# Patient Record
Sex: MALE | Race: WHITE | Employment: OTHER | URBAN - METROPOLITAN AREA
[De-identification: names, ages, dates, MRNs, and addresses within clinical notes are randomized per-mention and may not be internally consistent; named-entity substitution may affect disease eponyms.]

---

## 2017-05-05 ENCOUNTER — APPOINTMENT (OUTPATIENT)
Dept: GENERAL RADIOLOGY | Age: 67
End: 2017-05-05
Attending: EMERGENCY MEDICINE
Payer: MEDICARE

## 2017-05-05 ENCOUNTER — HOSPITAL ENCOUNTER (EMERGENCY)
Age: 67
Discharge: HOME | End: 2017-05-05
Attending: EMERGENCY MEDICINE
Payer: MEDICARE

## 2017-05-05 VITALS
OXYGEN SATURATION: 94 % | HEART RATE: 61 BPM | BODY MASS INDEX: 29.03 KG/M2 | TEMPERATURE: 97.9 F | HEIGHT: 67 IN | WEIGHT: 185 LBS | RESPIRATION RATE: 20 BRPM | DIASTOLIC BLOOD PRESSURE: 59 MMHG | SYSTOLIC BLOOD PRESSURE: 95 MMHG

## 2017-05-05 DIAGNOSIS — R07.9 CHEST PAIN, UNSPECIFIED TYPE: Primary | ICD-10-CM

## 2017-05-05 LAB
ALBUMIN SERPL BCP-MCNC: 3.8 G/DL (ref 3.4–5)
ALBUMIN/GLOB SERPL: 1.1 {RATIO} (ref 0.8–1.7)
ALP SERPL-CCNC: 63 U/L (ref 45–117)
ALT SERPL-CCNC: 35 U/L (ref 16–61)
ANION GAP BLD CALC-SCNC: 8 MMOL/L (ref 3–18)
AST SERPL W P-5'-P-CCNC: 20 U/L (ref 15–37)
BASOPHILS # BLD AUTO: 0 K/UL (ref 0–0.06)
BASOPHILS # BLD: 0 % (ref 0–2)
BILIRUB SERPL-MCNC: 0.6 MG/DL (ref 0.2–1)
BUN SERPL-MCNC: 28 MG/DL (ref 7–18)
BUN/CREAT SERPL: 28 (ref 12–20)
CALCIUM SERPL-MCNC: 8.8 MG/DL (ref 8.5–10.1)
CHLORIDE SERPL-SCNC: 106 MMOL/L (ref 100–108)
CK MB CFR SERPL CALC: 0.9 % (ref 0–4)
CK MB CFR SERPL CALC: 1 % (ref 0–4)
CK MB SERPL-MCNC: 1.5 NG/ML (ref 5–25)
CK MB SERPL-MCNC: 1.8 NG/ML (ref 5–25)
CK SERPL-CCNC: 160 U/L (ref 39–308)
CK SERPL-CCNC: 181 U/L (ref 39–308)
CO2 SERPL-SCNC: 30 MMOL/L (ref 21–32)
CREAT SERPL-MCNC: 0.99 MG/DL (ref 0.6–1.3)
DIFFERENTIAL METHOD BLD: NORMAL
EOSINOPHIL # BLD: 0.3 K/UL (ref 0–0.4)
EOSINOPHIL NFR BLD: 4 % (ref 0–5)
ERYTHROCYTE [DISTWIDTH] IN BLOOD BY AUTOMATED COUNT: 13.4 % (ref 11.6–14.5)
GLOBULIN SER CALC-MCNC: 3.4 G/DL (ref 2–4)
GLUCOSE SERPL-MCNC: 135 MG/DL (ref 74–99)
HCT VFR BLD AUTO: 41.9 % (ref 36–48)
HGB BLD-MCNC: 14.1 G/DL (ref 13–16)
INR PPP: 0.9 (ref 0.8–1.2)
LYMPHOCYTES # BLD AUTO: 22 % (ref 21–52)
LYMPHOCYTES # BLD: 1.7 K/UL (ref 0.9–3.6)
MCH RBC QN AUTO: 28.6 PG (ref 24–34)
MCHC RBC AUTO-ENTMCNC: 33.7 G/DL (ref 31–37)
MCV RBC AUTO: 85 FL (ref 74–97)
MONOCYTES # BLD: 0.7 K/UL (ref 0.05–1.2)
MONOCYTES NFR BLD AUTO: 9 % (ref 3–10)
NEUTS SEG # BLD: 5.2 K/UL (ref 1.8–8)
NEUTS SEG NFR BLD AUTO: 65 % (ref 40–73)
PLATELET # BLD AUTO: 212 K/UL (ref 135–420)
PMV BLD AUTO: 9.9 FL (ref 9.2–11.8)
POTASSIUM SERPL-SCNC: 3.9 MMOL/L (ref 3.5–5.5)
PROT SERPL-MCNC: 7.2 G/DL (ref 6.4–8.2)
PROTHROMBIN TIME: 12.2 SEC (ref 11.5–15.2)
RBC # BLD AUTO: 4.93 M/UL (ref 4.7–5.5)
SODIUM SERPL-SCNC: 144 MMOL/L (ref 136–145)
TROPONIN I SERPL-MCNC: <0.02 NG/ML (ref 0–0.06)
TROPONIN I SERPL-MCNC: <0.02 NG/ML (ref 0–0.06)
WBC # BLD AUTO: 7.9 K/UL (ref 4.6–13.2)

## 2017-05-05 PROCEDURE — 74011250637 HC RX REV CODE- 250/637: Performed by: EMERGENCY MEDICINE

## 2017-05-05 PROCEDURE — 71010 XR CHEST PORT: CPT

## 2017-05-05 PROCEDURE — 99284 EMERGENCY DEPT VISIT MOD MDM: CPT

## 2017-05-05 PROCEDURE — 85610 PROTHROMBIN TIME: CPT | Performed by: EMERGENCY MEDICINE

## 2017-05-05 PROCEDURE — 80053 COMPREHEN METABOLIC PANEL: CPT | Performed by: EMERGENCY MEDICINE

## 2017-05-05 PROCEDURE — 82550 ASSAY OF CK (CPK): CPT | Performed by: EMERGENCY MEDICINE

## 2017-05-05 PROCEDURE — 85025 COMPLETE CBC W/AUTO DIFF WBC: CPT | Performed by: EMERGENCY MEDICINE

## 2017-05-05 PROCEDURE — 93005 ELECTROCARDIOGRAM TRACING: CPT

## 2017-05-05 RX ORDER — GUAIFENESIN 100 MG/5ML
162 LIQUID (ML) ORAL
Status: COMPLETED | OUTPATIENT
Start: 2017-05-05 | End: 2017-05-05

## 2017-05-05 RX ORDER — DOXYCYCLINE 100 MG/1
100 CAPSULE ORAL 2 TIMES DAILY
Status: ON HOLD | COMMUNITY
End: 2020-08-03

## 2017-05-05 RX ORDER — SODIUM CHLORIDE 0.9 % (FLUSH) 0.9 %
5-10 SYRINGE (ML) INJECTION AS NEEDED
Status: DISCONTINUED | OUTPATIENT
Start: 2017-05-05 | End: 2017-05-06 | Stop reason: HOSPADM

## 2017-05-05 RX ADMIN — ASPIRIN 81 MG CHEWABLE TABLET 162 MG: 81 TABLET CHEWABLE at 17:48

## 2017-05-05 NOTE — ED TRIAGE NOTES
Pt co  Left sided CP states has hx of Stent X 2 this past Sept 2016.  Pt states he started with cp this am states it is a fluttering  Sensation in the upper chest region

## 2017-05-05 NOTE — ED PROVIDER NOTES
HPI Comments: 6:16 PM Fernanda Ross is a 74 y.o. male with a hx of CAD with two coronary stents, left nephrectomy, and HTN who presents to the ED c/o intermittent left sided chest pain that started two weeks ago that then became steady earlier this afternoon. He states that he called Dr. Tash Martinez this morning to report intermittent, brief \"twinges of chest pain\" over the past two weeks and Dr. Tash Martinez told him that it was not concerning; however, if his pain changes he was to report to the ED. This afternoon, when leaving a wedding, he developed a steady, left sided chest pressure that has persisted, so he decided to report to the ED. He is currently on ASA and Plavix due to his hx of coronary stents. The pt denies SOB, abdominal pain, back pain, fever, cough, and any further complaints. The history is provided by the patient. Past Medical History:   Diagnosis Date    Cardiac cath 09/16/2016    L dom. LM patent. pLAD 70% FFR 0.74 (2.75 x 23-mm Xience ERIC). mD 95% (2.25 x 12-mm Resolute ERIC). Cx patent. RCA patent. LVEDP 4.  EF 60%. Mild anterior RWMA. RA 7.  PA 20/5. RV 19/1. W 9.  CO/CI:  6.2/3.2 (TD); 6.5/3.4 (Hugh).  Chest pain     Coronary artery disease     2 stents placed 9/16/2016    Hematuria     Hypertension     Solitary kidney        Past Surgical History:   Procedure Laterality Date    CARDIAC CATHETERIZATION  9/16/2016         CORONARY ARTERY ANGIOGRAM  9/16/2016         CORONARY STENT SINGLE W/PTCA  9/16/2016         FRACTIONAL FLOW RESERVE  9/16/2016         HX APPENDECTOMY  1958    HX MOHS PROCEDURES Bilateral 1993    HX NEPHRECTOMY Left 2001    HX TONSILLECTOMY  1951    LV ANGIOGRAPHY  9/16/2016         RT & LT HEART WITH C.O.  9/16/2016              History reviewed. No pertinent family history.     Social History     Social History    Marital status:      Spouse name: N/A    Number of children: N/A    Years of education: N/A     Occupational History  Not on file. Social History Main Topics    Smoking status: Never Smoker    Smokeless tobacco: Never Used    Alcohol use No    Drug use: No    Sexual activity: Not on file     Other Topics Concern    Not on file     Social History Narrative         ALLERGIES: Review of patient's allergies indicates no known allergies. Review of Systems   Constitutional: Negative for chills and fever. HENT: Negative for congestion and sore throat. Respiratory: Negative for cough and shortness of breath. Cardiovascular: Positive for chest pain. Negative for leg swelling. Gastrointestinal: Negative for abdominal pain, nausea and vomiting. Genitourinary: Negative for dysuria and hematuria. Musculoskeletal: Negative for back pain. Skin: Negative for rash and wound. Neurological: Negative for syncope, light-headedness and headaches. Psychiatric/Behavioral: Negative for behavioral problems. The patient is not nervous/anxious. Vitals:    05/05/17 1815 05/05/17 1830 05/05/17 1930 05/05/17 2003   BP: 114/71 102/65 104/66 97/55   Pulse: 73 66 65 (!) 59   Resp: 16 11 21 13   Temp:       SpO2: 97% 97% 97% 96%   Weight:       Height:                Physical Exam   Constitutional: He is oriented to person, place, and time. He appears well-developed and well-nourished. No distress. HENT:   Head: Normocephalic and atraumatic. Right Ear: External ear normal.   Left Ear: External ear normal.   Nose: Nose normal.   Mouth/Throat: Oropharynx is clear and moist.   Eyes: Conjunctivae and EOM are normal. Pupils are equal, round, and reactive to light. No scleral icterus. Neck: Normal range of motion. Neck supple. No JVD present. No tracheal deviation present. No thyromegaly present. Cardiovascular: Normal rate, regular rhythm, normal heart sounds and intact distal pulses. Exam reveals no gallop and no friction rub. No murmur heard.   Pulmonary/Chest: Effort normal and breath sounds normal. He exhibits no tenderness. Abdominal: Soft. Bowel sounds are normal. He exhibits no distension. There is no tenderness. There is no rebound and no guarding. Musculoskeletal: Normal range of motion. He exhibits no edema or tenderness. Lymphadenopathy:     He has no cervical adenopathy. Neurological: He is alert and oriented to person, place, and time. No cranial nerve deficit. Coordination normal.   No sensory loss, Gait normal, Motor 5/5   Skin: Skin is warm and dry. Psychiatric: He has a normal mood and affect. His behavior is normal. Judgment and thought content normal.   Nursing note and vitals reviewed.        MDM  Number of Diagnoses or Management Options  Chest pain, unspecified type:   Diagnosis management comments: Pt signed out pending cardiac enzymes and dispo to Dr Sheila Smart    ED Course       Procedures    Vitals:  Patient Vitals for the past 12 hrs:   Temp Pulse Resp BP SpO2   05/05/17 2003 - (!) 59 13 97/55 96 %   05/05/17 1930 - 65 21 104/66 97 %   05/05/17 1830 - 66 11 102/65 97 %   05/05/17 1815 - 73 16 114/71 97 %   05/05/17 1800 - 96 13 114/61 97 %   05/05/17 1741 97.9 °F (36.6 °C) 85 16 132/69 97 %   Pulse ox reviewed and WNL    Medications ordered:   Medications   sodium chloride (NS) flush 5-10 mL (not administered)   aspirin chewable tablet 162 mg (162 mg Oral Given 5/5/17 1748)       Lab findings:  Recent Results (from the past 12 hour(s))   EKG, 12 LEAD, INITIAL    Collection Time: 05/05/17  5:38 PM   Result Value Ref Range    Ventricular Rate 81 BPM    Atrial Rate 81 BPM    P-R Interval 148 ms    QRS Duration 90 ms    Q-T Interval 372 ms    QTC Calculation (Bezet) 432 ms    Calculated P Axis 43 degrees    Calculated R Axis 36 degrees    Calculated T Axis 69 degrees    Diagnosis       Normal sinus rhythm  Normal ECG  When compared with ECG of 10-OCT-2016 09:48,  No significant change was found     CARDIAC PANEL,(CK, CKMB & TROPONIN)    Collection Time: 05/05/17  5:50 PM   Result Value Ref Range    CK 181 39 - 308 U/L    CK - MB 1.8 <3.6 ng/ml    CK-MB Index 1.0 0.0 - 4.0 %    Troponin-I, Qt. <0.02 0.00 - 0.06 NG/ML   CBC WITH AUTOMATED DIFF    Collection Time: 05/05/17  5:50 PM   Result Value Ref Range    WBC 7.9 4.6 - 13.2 K/uL    RBC 4.93 4.70 - 5.50 M/uL    HGB 14.1 13.0 - 16.0 g/dL    HCT 41.9 36.0 - 48.0 %    MCV 85.0 74.0 - 97.0 FL    MCH 28.6 24.0 - 34.0 PG    MCHC 33.7 31.0 - 37.0 g/dL    RDW 13.4 11.6 - 14.5 %    PLATELET 069 643 - 541 K/uL    MPV 9.9 9.2 - 11.8 FL    NEUTROPHILS 65 40 - 73 %    LYMPHOCYTES 22 21 - 52 %    MONOCYTES 9 3 - 10 %    EOSINOPHILS 4 0 - 5 %    BASOPHILS 0 0 - 2 %    ABS. NEUTROPHILS 5.2 1.8 - 8.0 K/UL    ABS. LYMPHOCYTES 1.7 0.9 - 3.6 K/UL    ABS. MONOCYTES 0.7 0.05 - 1.2 K/UL    ABS. EOSINOPHILS 0.3 0.0 - 0.4 K/UL    ABS. BASOPHILS 0.0 0.0 - 0.06 K/UL    DF AUTOMATED     METABOLIC PANEL, COMPREHENSIVE    Collection Time: 05/05/17  5:50 PM   Result Value Ref Range    Sodium 144 136 - 145 mmol/L    Potassium 3.9 3.5 - 5.5 mmol/L    Chloride 106 100 - 108 mmol/L    CO2 30 21 - 32 mmol/L    Anion gap 8 3.0 - 18 mmol/L    Glucose 135 (H) 74 - 99 mg/dL    BUN 28 (H) 7.0 - 18 MG/DL    Creatinine 0.99 0.6 - 1.3 MG/DL    BUN/Creatinine ratio 28 (H) 12 - 20      GFR est AA >60 >60 ml/min/1.73m2    GFR est non-AA >60 >60 ml/min/1.73m2    Calcium 8.8 8.5 - 10.1 MG/DL    Bilirubin, total 0.6 0.2 - 1.0 MG/DL    ALT (SGPT) 35 16 - 61 U/L    AST (SGOT) 20 15 - 37 U/L    Alk.  phosphatase 63 45 - 117 U/L    Protein, total 7.2 6.4 - 8.2 g/dL    Albumin 3.8 3.4 - 5.0 g/dL    Globulin 3.4 2.0 - 4.0 g/dL    A-G Ratio 1.1 0.8 - 1.7     PROTHROMBIN TIME + INR    Collection Time: 05/05/17  5:50 PM   Result Value Ref Range    Prothrombin time 12.2 11.5 - 15.2 sec    INR 0.9 0.8 - 1.2         EKG interpretation by ED Physician:  0214 NSR, rate 81 BPM, no STEMI per Dr. Marce Jolly, CT or other radiology findings or impressions:  XR CHEST PORT   Final Result   IMPRESSION:     No radiographic finding for an acute cardiopulmonary process. No significant  interval change. Per Dr. Shanti Brand, Radiology       Progress notes, Consult notes or additional Procedure notes:   7:00 PM : Pt care transferred to Dr. Dickson Chaudhary provider. History of patient complaint(s), available diagnostic reports and current treatment plan has been discussed thoroughly. Bedside rounding on patient occured : no . Intended disposition of patient : TBD  Pending diagnostics reports and/or labs (please list): Repeat cardiac panel      Disposition:  Diagnosis: No diagnosis found. Disposition: Pending    SCRIBE ATTESTATION STATEMENT  Documented by: Tyree Rizzo scribing for, and in the presence of, Jc Choi DO 6:57 PM    Signed by: Constantine Rico, 05/05/17 6:57 PM    PROVIDER ATTESTATION STATEMENT  I personally performed the services described in the documentation, reviewed the documentation, as recorded by the scribe in my presence, and it accurately and completely records my words and actions.   Jc Choi DO

## 2017-05-06 NOTE — ED NOTES
Vitals:  Patient Vitals for the past 12 hrs:   Temp Pulse Resp BP SpO2   05/05/17 2215 - 61 20 95/59 94 %   05/05/17 2115 - 63 11 95/57 97 %   05/05/17 2030 - 61 12 100/52 97 %   05/05/17 2003 - (!) 59 13 97/55 96 %   05/05/17 1930 - 65 21 104/66 97 %   05/05/17 1830 - 66 11 102/65 97 %   05/05/17 1815 - 73 16 114/71 97 %   05/05/17 1800 - 96 13 114/61 97 %   05/05/17 1741 97.9 °F (36.6 °C) 85 16 132/69 97 %     Pulse ox reviewed and WNL    Medications ordered:   Medications   sodium chloride (NS) flush 5-10 mL (not administered)   aspirin chewable tablet 162 mg (162 mg Oral Given 5/5/17 1748)         Lab findings:  CARDIAC PANEL,(CK, CKMB & TROPONIN)    Collection Time: 05/05/17  9:30 PM   Result Value Ref Range     39 - 308 U/L    CK - MB 1.5 <3.6 ng/ml    CK-MB Index 0.9 0.0 - 4.0 %    Troponin-I, Qt. <0.02 0.00 - 0.06 NG/ML     Progress notes, Consult notes or additional Procedure notes:   7:00 PM :Pt care assumed from Dr. Viviane Felix , ED provider. Pt complaint(s), current treatment plan, progression and available diagnostic results have been discussed thoroughly. Rounding occurred: no  Intended Disposition: TBD   Pending diagnostic reports and/or labs (please list): Repeat cardiac panel    10:30 PM Pt reevaluated at this time and is resting comfortably in NAD, no pain, wants to go home. Discussed results and findings, as well as, diagnosis and plan for discharge. Pt verbalizes understanding and agreement with plan. All questions addressed at this time. Disposition:  Diagnosis:   1.  Chest pain, unspecified type        Disposition: Discharge    Follow-up Information     Follow up With Details Comments MD Ryan Call in 1 day  [id number] [facility] [id number] Elodia Ortiz MD Call in 1 day  [address]      [id number] [place] [address] EMERGENCY DEPT  As needed, If symptoms worsen [address] [facility]  [phone]           Patient's Medications   Start Taking    No medications on file   Continue Taking    ALFUZOSIN SR (UROXATRAL) 10 MG SR TABLET    Take 10 mg by mouth daily. ASPIRIN 81 MG CHEWABLE TABLET    Take 81 mg by mouth daily. ATORVASTATIN (LIPITOR) 40 MG TABLET    Take 1 Tab by mouth nightly. CLOPIDOGREL (PLAVIX) 75 MG TABLET    Take 1 Tab by mouth daily. DICLOFENAC (VOLTAREN) 1 % GEL    Apply 2 g to affected area two (2) times a day. DICLOFENAC EC (VOLTAREN) 75 MG EC TABLET    Take 75 mg by mouth two (2) times a day. DOCUSATE SODIUM (STOOL SOFTENER) 50 MG CAPSULE    Take 50 mg by mouth two (2) times a day. DOXYCYCLINE (MONODOX) 100 MG CAPSULE    Take 100 mg by mouth two (2) times a day. GLUCOSAMINE 1,000 MG TAB    Take 1,500 mg by mouth daily. LORATADINE (ALLERCLEAR) 10 MG TABLET    Take 10 mg by mouth daily. METOPROLOL SUCCINATE (TOPROL XL) 25 MG XL TABLET    Take 1 Tab by mouth daily. MULTIVITAMIN (ONE A DAY) TABLET    Take 1 Tab by mouth daily. These Medications have changed    No medications on file   Stop Taking    CIPROFLOXACIN HCL (CIPRO) 500 MG TABLET    Take 1 Tab by mouth two (2) times a day. OXYBUTYNIN (DITROPAN) 5 MG TABLET    Take 1 Tab by mouth three (3) times daily as needed. For bladder spasms          SCRIBE ATTESTATION STATEMENT  Documented by: Kwabena Trujillo scribing for, and in the presence of, Santiago Alvarez MD 10:24 PM  Signed by: Constantine St, 05/05/17 10:24 PM    PROVIDER ATTESTATION STATEMENT  I personally performed the services described in the documentation, reviewed the documentation, as recorded by the scribe in my presence, and it accurately and completely records my words and actions.   Santiago Alvarez MD

## 2017-05-06 NOTE — DISCHARGE INSTRUCTIONS
Chest Pain: Care Instructions  Your Care Instructions  There are many things that can cause chest pain. Some are not serious and will get better on their own in a few days. But some kinds of chest pain need more testing and treatment. Your doctor may have recommended a follow-up visit in the next 8 to 12 hours. If you are not getting better, you may need more tests or treatment. Even though your doctor has released you, you still need to watch for any problems. The doctor carefully checked you, but sometimes problems can develop later. If you have new symptoms or if your symptoms do not get better, get medical care right away. If you have worse or different chest pain or pressure that lasts more than 5 minutes or you passed out (lost consciousness), call 911 or seek other emergency help right away. A medical visit is only one step in your treatment. Even if you feel better, you still need to do what your doctor recommends, such as going to all suggested follow-up appointments and taking medicines exactly as directed. This will help you recover and help prevent future problems. How can you care for yourself at home? · Rest until you feel better. · Take your medicine exactly as prescribed. Call your doctor if you think you are having a problem with your medicine. · Do not drive after taking a prescription pain medicine. When should you call for help? Call 911 if:  · You passed out (lost consciousness). · You have severe difficulty breathing. · You have symptoms of a heart attack. These may include:  ¨ Chest pain or pressure, or a strange feeling in your chest.  ¨ Sweating. ¨ Shortness of breath. ¨ Nausea or vomiting. ¨ Pain, pressure, or a strange feeling in your back, neck, jaw, or upper belly or in one or both shoulders or arms. ¨ Lightheadedness or sudden weakness. ¨ A fast or irregular heartbeat.   After you call 911, the  may tell you to chew 1 adult-strength or 2 to 4 low-dose aspirin. Wait for an ambulance. Do not try to drive yourself. Call your doctor today if:  · You have any trouble breathing. · Your chest pain gets worse. · You are dizzy or lightheaded, or you feel like you may faint. · You are not getting better as expected. · You are having new or different chest pain. Where can you learn more? Go to http://barbara-alexis.info/. Enter A120 in the search box to learn more about \"Chest Pain: Care Instructions. \"  Current as of: May 27, 2016  Content Version: 11.2  © 0763-8912 [employer]. Care instructions adapted under license by [employer] (which disclaims liability or warranty for this information). If you have questions about a medical condition or this instruction, always ask your healthcare professional. [address] any warranty or liability for your use of this information.

## 2017-05-08 LAB
ATRIAL RATE: 81 BPM
CALCULATED P AXIS, ECG09: 43 DEGREES
CALCULATED R AXIS, ECG10: 36 DEGREES
CALCULATED T AXIS, ECG11: 69 DEGREES
DIAGNOSIS, 93000: NORMAL
P-R INTERVAL, ECG05: 148 MS
Q-T INTERVAL, ECG07: 372 MS
QRS DURATION, ECG06: 90 MS
QTC CALCULATION (BEZET), ECG08: 432 MS
VENTRICULAR RATE, ECG03: 81 BPM

## 2017-07-25 ENCOUNTER — OFFICE VISIT (OUTPATIENT)
Dept: CARDIOLOGY CLINIC | Age: 67
End: 2017-07-25

## 2017-07-25 VITALS
WEIGHT: 192 LBS | DIASTOLIC BLOOD PRESSURE: 62 MMHG | SYSTOLIC BLOOD PRESSURE: 118 MMHG | HEART RATE: 81 BPM | BODY MASS INDEX: 30.13 KG/M2 | OXYGEN SATURATION: 97 % | HEIGHT: 67 IN

## 2017-07-25 DIAGNOSIS — E78.5 DYSLIPIDEMIA: ICD-10-CM

## 2017-07-25 DIAGNOSIS — I25.110 CORONARY ARTERY DISEASE INVOLVING NATIVE CORONARY ARTERY OF NATIVE HEART WITH UNSTABLE ANGINA PECTORIS (HCC): Primary | ICD-10-CM

## 2017-07-25 DIAGNOSIS — Z95.5 S/P CORONARY ARTERY STENT PLACEMENT: ICD-10-CM

## 2017-07-25 NOTE — PROGRESS NOTES
1. Have you been to the ER, urgent care clinic since your last visit? Hospitalized since your last visit? Yes, 5/2/17 for chest pain; 10/17/16 - 10/20/16 hematuria     2. Have you seen or consulted any other health care providers outside of the [address] [place] [address] since your last visit? Include any pap smears or colon screening.   No

## 2017-07-25 NOTE — MR AVS SNAPSHOT
Visit Information Date & Time Provider Department Dept. Phone Encounter #  
 7/25/2017  2:00 PM Kimi Romero MD [facility] [address] [id number] Upcoming Health Maintenance Date Due Hepatitis C Screening 1950 DTaP/Tdap/Td series (1 - Tdap) 10/18/1969 FOBT Q 1 YEAR AGE 50-75 10/18/1998 ZOSTER VACCINE AGE 60> 8/18/2008 GLAUCOMA SCREENING Q2Y 10/18/2013 Pneumococcal 65+ Low/Medium Risk (1 of 2 - PCV13) 10/18/2013 MEDICARE YEARLY EXAM 10/18/2013 INFLUENZA AGE 9 TO ADULT 8/1/2017 Allergies as of 7/25/2017  Review Complete On: 7/25/2017 By: Kimi Romero MD  
 No Known Allergies Current Immunizations  Reviewed on 9/18/2016 No immunizations on file. Not reviewed this visit You Were Diagnosed With   
  
 Codes Comments Coronary artery disease involving native coronary artery of native heart with unstable angina pectoris ([facility] [address]    -  Primary ICD-10-CM: I25.110 
ICD-9-CM: 414.01, 411.1 Dyslipidemia     ICD-10-CM: E78.5 ICD-9-CM: 272.4 S/P coronary artery stent placement     ICD-10-CM: Z95.5 ICD-9-CM: V45.82 Vitals BP Pulse Height(growth percentile) Weight(growth percentile) SpO2 BMI  
 118/62 81 5' 7\" (1.702 m) 192 lb (87.1 kg) 97% 30.07 kg/m2 Smoking Status Never Smoker Vitals History BMI and BSA Data Body Mass Index Body Surface Area 30.07 kg/m 2 2.03 m 2 Preferred Pharmacy Pharmacy Name Phone [facility] [address] [phone] Your Updated Medication List  
  
   
This list is accurate as of: 7/25/17  2:36 PM.  Always use your most recent med list.  
  
  
  
  
 ALLERCLEAR 10 mg tablet Generic drug:  loratadine Take 10 mg by mouth daily. aspirin 81 mg chewable tablet Take 81 mg by mouth daily. atorvastatin 40 mg tablet Commonly known as:  LIPITOR Take 1 Tab by mouth nightly. clopidogrel 75 mg Tab Commonly known as:  PLAVIX Take 1 Tab by mouth daily. * diclofenac EC 75 mg EC tablet Commonly known as:  VOLTAREN Take 75 mg by mouth two (2) times a day. * VOLTAREN 1 % Gel Generic drug:  diclofenac Apply 2 g to affected area two (2) times a day. doxycycline 100 mg capsule Commonly known as:  Marolyn Mort Take 100 mg by mouth two (2) times a day. glucosamine 1,000 mg Tab Take 1,500 mg by mouth daily. metoprolol succinate 25 mg XL tablet Commonly known as:  TOPROL XL Take 1 Tab by mouth daily. multivitamin tablet Commonly known as:  ONE A DAY Take 1 Tab by mouth daily. STOOL SOFTENER 50 mg capsule Generic drug:  docusate sodium Take 50 mg by mouth two (2) times a day. UROXATRAL 10 mg SR tablet Generic drug:  alfuzosin SR Take 10 mg by mouth daily. * Notice: This list has 2 medication(s) that are the same as other medications prescribed for you. Read the directions carefully, and ask your doctor or other care provider to review them with you. We Performed the Following AMB POC EKG ROUTINE W/ 12 LEADS, INTER & REP [53931 CPT(R)] Introducing [facility]! Dear Ramez Whaley: Thank you for requesting a [employer] account. Our records indicate that you already have an active [employer] account. You can access your account anytime at [email or web address] Did you know that you can access your hospital and ER discharge instructions at any time in [employer]? You can also review all of your test results from your hospital stay or ER visit. Additional Information If you have questions, please visit the Frequently Asked Questions section of the [employer] website at [email or web address] Remember, [employer] is NOT to be used for urgent needs. For medical emergencies, dial 911. Now available from your iPhone and Android! Please provide this summary of care documentation to your next provider. Your primary care clinician is listed as Daja Vincent. If you have any questions after today's visit, please call [phone].

## 2017-07-25 NOTE — PROGRESS NOTES
HISTORY OF PRESENT ILLNESS  Melissa Mclaughlin is a 74 y.o. male. ASSESSMENT and PLAN    Mr. Nilam Fernandes has history of CAD. He had exertional chest pains noted back in the summer of 2016. He had echocardiogram and nuclear stress study at [facility] and was told that he had no significant CAD. Two weeks later, with accelerating exertional angina, he was seen by cardiology. At that time, with his classic symptoms, he was scheduled for coronary angiography, which  Revealed significant, long LAD as well as diagonal disease. He underwent complex LAD diagonal stent procedure with resolution of his symptoms. He also has history of hematuria. He was recently diagnosed with bladder tumor; surgical biopsy is planned. There is concern for possible malignancy. He also only has one kidney with normal kidney function.  CAD:   Clinically stable.  BP:   Well-controlled.  HR:    Stable.  CHF:   Currently, there is no evidence of decompensated CHF noted.  Weight:   His weight today is 192 pounds. He has gained about 8 pounds in last 10 months. Strong encouragement was given to lose the weight that he had gained.  Cholesterol:   Target LDL <70. He remains on Lipitor 40 mg daily.  Anti-platelet:   Remains on ASA, and Plavix. I will see him back in 6-9 months. Thank you. Encounter Diagnoses   Name Primary?     Coronary artery disease involving native coronary artery of native heart with unstable angina pectoris ([address] Yes    Dyslipidemia     S/P coronary artery stent placement with drug eluding stents in the LAD and diagonal      current treatment plan is effective, no change in therapy  lab results and schedule of future lab studies reviewed with patient  reviewed diet, exercise and weight control  cardiovascular risk and specific lipid/LDL goals reviewed  use of aspirin to prevent MI and TIA's discussed      HPI   Today, Mr. Sunil Childress has no complaints of chest pains, increased shortness of breath or decreased exercise capacity. He recently  his girlfriend. They were  in July 2017. He has been quite active with travels. He has not had any recurrent chest pain since his episode in May 2017 which was a false alarm. Review of Systems   Respiratory: Negative for shortness of breath. Cardiovascular: Negative for chest pain, palpitations, orthopnea, claudication, leg swelling and PND. All other systems reviewed and are negative. Physical Exam   Constitutional: He is oriented to person, place, and time. He appears well-developed and well-nourished. HENT:   Head: Normocephalic. Eyes: Conjunctivae are normal.   Neck: Neck supple. No JVD present. Carotid bruit is not present. No thyromegaly present. Cardiovascular: Normal rate and regular rhythm. No murmur heard. Pulmonary/Chest: Breath sounds normal.   Abdominal: Bowel sounds are normal.   Musculoskeletal: He exhibits no edema. Neurological: He is alert and oriented to person, place, and time. Skin: Skin is warm and dry. Nursing note and vitals reviewed. PCP: Siddhartha Arenas MD    Past Medical History:   Diagnosis Date    Cardiac cath 09/16/2016    L dom. LM patent. pLAD 70% FFR 0.74 (2.75 x 23-mm Xience ERIC). mD 95% (2.25 x 12-mm Resolute ERIC). Cx patent. RCA patent. LVEDP 4.  EF 60%. Mild anterior RWMA. RA 7.  PA 20/5. RV 19/1. W 9.  CO/CI:  6.2/3.2 (TD); 6.5/3.4 (Hugh).       Chest pain     Coronary artery disease     2 stents placed 9/16/2016    Hematuria     Hypertension     Solitary kidney        Past Surgical History:   Procedure Laterality Date    CARDIAC CATHETERIZATION  9/16/2016         CORONARY ARTERY ANGIOGRAM  9/16/2016         CORONARY STENT SINGLE W/PTCA  9/16/2016         FRACTIONAL FLOW RESERVE  9/16/2016         HX APPENDECTOMY  1958    HX MOHS PROCEDURES Bilateral 1993    HX NEPHRECTOMY Left 2001    HX TONSILLECTOMY  1951    LV ANGIOGRAPHY  9/16/2016  RT & LT HEART WITH C.O.  9/16/2016            Current Outpatient Prescriptions   Medication Sig Dispense Refill    doxycycline (MONODOX) 100 mg capsule Take 100 mg by mouth two (2) times a day.  atorvastatin (LIPITOR) 40 mg tablet Take 1 Tab by mouth nightly. 30 Tab 11    clopidogrel (PLAVIX) 75 mg tablet Take 1 Tab by mouth daily. 30 Tab 11    metoprolol succinate (TOPROL XL) 25 mg XL tablet Take 1 Tab by mouth daily. 30 Tab 11    alfuzosin SR (UROXATRAL) 10 mg SR tablet Take 10 mg by mouth daily.  aspirin 81 mg chewable tablet Take 81 mg by mouth daily.  glucosamine 1,000 mg tab Take 1,500 mg by mouth daily.  loratadine (ALLERCLEAR) 10 mg tablet Take 10 mg by mouth daily.  docusate sodium (STOOL SOFTENER) 50 mg capsule Take 50 mg by mouth two (2) times a day.  multivitamin (ONE A DAY) tablet Take 1 Tab by mouth daily.  diclofenac EC (VOLTAREN) 75 mg EC tablet Take 75 mg by mouth two (2) times a day.  diclofenac (VOLTAREN) 1 % gel Apply 2 g to affected area two (2) times a day. The patient has a family history of    Social History   Substance Use Topics    Smoking status: Never Smoker    Smokeless tobacco: Never Used    Alcohol use No       Lab Results   Component Value Date/Time    Cholesterol, total 143 09/19/2016 05:09 AM    HDL Cholesterol 38 09/19/2016 05:09 AM    LDL, calculated 85 09/19/2016 05:09 AM    Triglyceride 100 09/19/2016 05:09 AM    CHOL/HDL Ratio 3.8 09/19/2016 05:09 AM        BP Readings from Last 3 Encounters:   07/25/17 118/62   05/05/17 95/59   01/23/17 140/84        Pulse Readings from Last 3 Encounters:   07/25/17 81   05/05/17 61   10/18/16 (!) 55       Wt Readings from Last 3 Encounters:   07/25/17 87.1 kg (192 lb)   05/05/17 83.9 kg (185 lb)   01/23/17 83 kg (183 lb)         EKG: unchanged from previous tracings, normal sinus rhythm, nonspecific ST and T waves changes.

## 2017-09-04 RX ORDER — METOPROLOL SUCCINATE 25 MG/1
TABLET, EXTENDED RELEASE ORAL
Qty: 30 TAB | Refills: 11 | Status: SHIPPED | OUTPATIENT
Start: 2017-09-04 | End: 2017-12-04 | Stop reason: SDUPTHER

## 2017-09-11 RX ORDER — ATORVASTATIN CALCIUM 40 MG/1
TABLET, FILM COATED ORAL
Qty: 30 TAB | Refills: 11 | Status: SHIPPED | OUTPATIENT
Start: 2017-09-11 | End: 2017-12-04 | Stop reason: SDUPTHER

## 2017-10-16 RX ORDER — CLOPIDOGREL BISULFATE 75 MG/1
TABLET ORAL
Qty: 30 TAB | Refills: 11 | Status: SHIPPED | OUTPATIENT
Start: 2017-10-16 | End: 2017-12-04 | Stop reason: SDUPTHER

## 2017-12-04 RX ORDER — CLOPIDOGREL BISULFATE 75 MG/1
75 TABLET ORAL DAILY
Qty: 30 TAB | Refills: 11 | Status: SHIPPED | OUTPATIENT
Start: 2017-12-04 | End: 2017-12-15 | Stop reason: SDUPTHER

## 2017-12-04 RX ORDER — METOPROLOL SUCCINATE 25 MG/1
25 TABLET, EXTENDED RELEASE ORAL DAILY
Qty: 30 TAB | Refills: 11 | Status: SHIPPED | OUTPATIENT
Start: 2017-12-04 | End: 2017-12-15 | Stop reason: SDUPTHER

## 2017-12-04 RX ORDER — ATORVASTATIN CALCIUM 40 MG/1
40 TABLET, FILM COATED ORAL DAILY
Qty: 30 TAB | Refills: 11 | Status: SHIPPED | OUTPATIENT
Start: 2017-12-04 | End: 2017-12-15 | Stop reason: SDUPTHER

## 2017-12-04 NOTE — TELEPHONE ENCOUNTER
From: Mariza Estrella  To: Maye Reyes MD  Sent: 12/4/2017 11:04 AM EST  Subject: Medication Renewal Request    Original authorizing provider: MD Mariza Jj. Sameer would like a refill of the following medications:  metoprolol succinate (TOPROL-XL) 25 mg XL tablet [Reddy Doss MD]  atorvastatin (LIPITOR) 40 mg tablet Maye Reyes MD]  clopidogrel (PLAVIX) 75 mg tab Maye Reyes MD]    Preferred pharmacy: [address]    Comment:  I need refills on the three medications checked above. Please change the amount from 30 to 90 tablets and send to [facility] in [place].

## 2017-12-18 RX ORDER — CLOPIDOGREL BISULFATE 75 MG/1
75 TABLET ORAL DAILY
Qty: 30 TAB | Refills: 11 | Status: SHIPPED | OUTPATIENT
Start: 2017-12-18 | End: 2018-02-27 | Stop reason: SDUPTHER

## 2017-12-18 RX ORDER — METOPROLOL SUCCINATE 25 MG/1
25 TABLET, EXTENDED RELEASE ORAL DAILY
Qty: 30 TAB | Refills: 11 | Status: SHIPPED | OUTPATIENT
Start: 2017-12-18 | End: 2018-02-27 | Stop reason: SDUPTHER

## 2017-12-18 RX ORDER — ATORVASTATIN CALCIUM 40 MG/1
40 TABLET, FILM COATED ORAL DAILY
Qty: 30 TAB | Refills: 11 | Status: SHIPPED | OUTPATIENT
Start: 2017-12-18 | End: 2018-02-27 | Stop reason: SDUPTHER

## 2017-12-18 NOTE — TELEPHONE ENCOUNTER
From: Julio Estrella  To: Danya Chu MD  Sent: 12/15/2017 8:37 AM EST  Subject: Medication Renewal Request    Original authorizing provider: MD Julio Ruelas. Sameer would like a refill of the following medications:  metoprolol succinate (TOPROL-XL) 25 mg XL tablet [Reddy Doss MD]  atorvastatin (LIPITOR) 40 mg tablet Danya Chu MD]  clopidogrel (PLAVIX) 75 mg tab Danya Chu MD]    Preferred pharmacy: [address]    Comment:  Please refill 90 tablets each. I asked for 90 last time and got 30.     Medication renewals requested in this message routed to other providers:  alfuzosin SR (UROXATRAL) 10 mg SR tablet Audrey Madison MD]

## 2018-02-26 ENCOUNTER — TELEPHONE (OUTPATIENT)
Dept: CARDIOLOGY CLINIC | Age: 68
End: 2018-02-26

## 2018-02-26 NOTE — TELEPHONE ENCOUNTER
Patient called stating he has has some chest tightness over the last 6 days. He states that it does not feel like last time when he has had stents put in. No other symptoms. He said he is not too alarmed and does not feel he needs to go to the ER. Patient scheduled to see Jasvir Collins tomorrow at 220.

## 2018-02-27 ENCOUNTER — OFFICE VISIT (OUTPATIENT)
Dept: CARDIOLOGY CLINIC | Age: 68
End: 2018-02-27

## 2018-02-27 VITALS
OXYGEN SATURATION: 98 % | WEIGHT: 193 LBS | BODY MASS INDEX: 30.29 KG/M2 | HEIGHT: 67 IN | HEART RATE: 77 BPM | DIASTOLIC BLOOD PRESSURE: 82 MMHG | SYSTOLIC BLOOD PRESSURE: 110 MMHG

## 2018-02-27 DIAGNOSIS — Z95.5 S/P CORONARY ARTERY STENT PLACEMENT: ICD-10-CM

## 2018-02-27 DIAGNOSIS — I25.110 CORONARY ARTERY DISEASE INVOLVING NATIVE CORONARY ARTERY OF NATIVE HEART WITH UNSTABLE ANGINA PECTORIS (HCC): Primary | ICD-10-CM

## 2018-02-27 DIAGNOSIS — E78.5 DYSLIPIDEMIA: ICD-10-CM

## 2018-02-27 DIAGNOSIS — R07.89 ATYPICAL CHEST PAIN: ICD-10-CM

## 2018-02-27 RX ORDER — ATORVASTATIN CALCIUM 40 MG/1
40 TABLET, FILM COATED ORAL DAILY
Qty: 90 TAB | Refills: 3 | Status: SHIPPED | OUTPATIENT
Start: 2018-02-27 | End: 2019-03-21 | Stop reason: SDUPTHER

## 2018-02-27 RX ORDER — METOPROLOL SUCCINATE 25 MG/1
25 TABLET, EXTENDED RELEASE ORAL DAILY
Qty: 90 TAB | Refills: 3 | Status: SHIPPED | OUTPATIENT
Start: 2018-02-27 | End: 2019-02-25 | Stop reason: SDUPTHER

## 2018-02-27 RX ORDER — CLOPIDOGREL BISULFATE 75 MG/1
75 TABLET ORAL DAILY
Qty: 90 TAB | Refills: 3 | Status: SHIPPED | OUTPATIENT
Start: 2018-02-27 | End: 2019-02-25 | Stop reason: SDUPTHER

## 2018-02-27 NOTE — MR AVS SNAPSHOT
[address] [address] [address] [address] [phone] Patient: Brenton Garcia MRN: [id number] :1950 Visit Information Date & Time Provider Department Dept. Phone Encounter #  
 2018  2:20 PM Zachary Martinez MD [facility] [id number] Your Appointments 3/27/2018  2:00 PM  
Follow Up with Zachary Martinez MD  
[facility] ([facility]) Appt Note: 6-9 month f/up [place] [address] [address] [address] [phone] [address] Upcoming Health Maintenance Date Due Hepatitis C Screening 1950 DTaP/Tdap/Td series (1 - Tdap) 10/18/1969 FOBT Q 1 YEAR AGE 50-75 10/18/1998 ZOSTER VACCINE AGE 60> 2008 GLAUCOMA SCREENING Q2Y 10/18/2013 Pneumococcal 65+ Low/Medium Risk (1 of 2 - PCV13) 10/18/2013 MEDICARE YEARLY EXAM 10/18/2013 Influenza Age 5 to Adult 2017 Allergies as of 2018  Review Complete On: 2/15/2018 By: Ida Betancur MD  
 No Known Allergies Current Immunizations  Reviewed on 2016 No immunizations on file. Not reviewed this visit You Were Diagnosed With   
  
 Codes Comments Coronary artery disease involving native coronary artery of native heart with unstable angina pectoris ([facility] [address]    -  Primary ICD-10-CM: I25.110 
ICD-9-CM: 414.01, 411.1 Dyslipidemia     ICD-10-CM: E78.5 ICD-9-CM: 272.4 S/P coronary artery stent placement     ICD-10-CM: Z95.5 ICD-9-CM: V45.82 Atypical chest pain     ICD-10-CM: R07.89 ICD-9-CM: 786.59 Vitals BP Pulse Height(growth percentile) Weight(growth percentile) SpO2 BMI  
 110/82 77 5' 7\" (1.702 m) 193 lb (87.5 kg) 98% 30.23 kg/m2 Smoking Status Never Smoker Vitals History BMI and BSA Data Body Mass Index Body Surface Area  
 30.23 kg/m 2 2.03 m 2 Preferred Pharmacy Pharmacy Name Phone [facility] [phone] Your Updated Medication List  
  
   
This list is accurate as of 2/27/18  2:49 PM.  Always use your most recent med list.  
  
  
  
  
 alfuzosin SR 10 mg SR tablet Commonly known as:  Penne Power Take 1 Tab by mouth daily (after dinner). ALLERCLEAR 10 mg tablet Generic drug:  loratadine Take 10 mg by mouth daily. aspirin 81 mg chewable tablet Take 81 mg by mouth daily. atorvastatin 40 mg tablet Commonly known as:  LIPITOR Take 1 Tab by mouth daily. clopidogrel 75 mg Tab Commonly known as:  PLAVIX Take 1 Tab by mouth daily. * diclofenac EC 75 mg EC tablet Commonly known as:  VOLTAREN Take 75 mg by mouth two (2) times a day. * VOLTAREN 1 % Gel Generic drug:  diclofenac Apply 2 g to affected area two (2) times a day. doxycycline 100 mg capsule Commonly known as:  Shaheen  Take 100 mg by mouth two (2) times a day. glucosamine 1,000 mg Tab Take 1,500 mg by mouth daily. metoprolol succinate 25 mg XL tablet Commonly known as:  TOPROL-XL Take 1 Tab by mouth daily. multivitamin tablet Commonly known as:  ONE A DAY Take 1 Tab by mouth daily. STOOL SOFTENER 50 mg capsule Generic drug:  docusate sodium Take 50 mg by mouth two (2) times a day. * Notice: This list has 2 medication(s) that are the same as other medications prescribed for you. Read the directions carefully, and ask your doctor or other care provider to review them with you. We Performed the Following AMB POC EKG ROUTINE W/ 12 LEADS, INTER & REP [68483 CPT(R)] To-Do List   
 02/27/2018 ECG:  CARDIAC SPECT REST AND STRESS   
  
 02/27/2018 ECG:  NUCLEAR STRESS TEST Introducing [facility]! Dear Justina Ramos: Thank you for requesting a Phone[employer]com account. Our records indicate that you already have an active [email or web address] account. You can access your account anytime at [email or web address] Did you know that you can access your hospital and ER discharge instructions at any time in [email or web address]? You can also review all of your test results from your hospital stay or ER visit. Additional Information If you have questions, please visit the Frequently Asked Questions section of the Phone.com website at [email or web address] Remember, Phone.com is NOT to be used for urgent needs. For medical emergencies, dial 911. Now available from your iPhone and Android! Please provide this summary of care documentation to your next provider. Your primary care clinician is listed as Geremias Anguiano. If you have any questions after today's visit, please call [phone].

## 2018-02-27 NOTE — PROGRESS NOTES
HISTORY OF PRESENT ILLNESS  Daniella Atkins is a 69 y.o. male. ASSESSMENT and PLAN    Mr. Angel Crump has history of CAD. He had exertional chest pains noted back in the summer of 2016. Bala Grant had echocardiogram and nuclear stress study at [facility] and was told that he had no significant CAD. Two weeks later, with accelerating exertional angina, he was seen by cardiology.  At that time, with his classic symptoms, he was scheduled for coronary angiography, which  Revealed significant, long LAD as well as diagonal disease. He underwent complex LAD diagonal stent procedure with resolution of his symptoms. He also has history of hematuria. He was recently diagnosed with bladder tumor; surgical biopsy is planned. There is concern for possible malignancy. 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 He also only has one kidney with normal kidney function.  CAD:   His chest pain is atypical.  It is different from what he had when he presented with the acute coronary syndrome back in summer 2016. Nevertheless, he is quite concerned. Despite the fact that the pain lasts for essentially 9 days continuously, he is worried about coronary syndrome. Therefore, we will proceed with pharmacologic nuclear scan. If there is significant reversible defect, I advised him that he will likely need repeat coronary angiography.  BP:   Well-controlled.  HR:    Stable.  CHF:   There is no evidence of decompensated CHF noted.  Weight:   His weight today is 193 pounds. His baseline weight is 185 pounds. I advised him to work on [employer].  Cholesterol:   Target LDL <70. He remains on Lipitor 40 mg daily.  Anti-platelet:   Remains on ASA, and Plavix.         · CAD:   Clinically stable. · BP:   Well-controlled. · HR:    Stable. · CHF:   Currently, there is no evidence of decompensated CHF noted.   · Weight:   His weight today is 192 pounds. He has gained about 8 pounds in last 10 months. Strong encouragement was given to lose the weight that he had gained. · Cholesterol:   Target LDL <70. He remains on Lipitor 40 mg daily. · Anti-platelet:   Remains on ASA, and Plavix.       I will see him back in 6-9 months. Thank you.       Encounter Diagnoses   Name Primary?  Coronary artery disease involving native coronary artery of native heart with unstable angina pectoris ([address] Yes    Dyslipidemia     S/P coronary artery stent placement with drug eluding stents in the LAD and diagonal     Atypical chest pain      current treatment plan is effective, no change in therapy  lab results and schedule of future lab studies reviewed with patient  reviewed diet, exercise and weight control  cardiovascular risk and specific lipid/LDL goals reviewed  use of aspirin to prevent MI and TIA's discussed      HPI  Today, Mr. Henrique Chu has no complaints of exertional component to chest pain. However, over the last 9 days, he has had fairly persistent and continuous chest pains which he describes as chest tightness and heaviness. He is quite concerned that he may be having another acute coronary syndrome. He is accompanied by his wife. He denies any changes in his exercise capacity. He has baseline dyspnea on exertion without changes. With exertion, his current chest discomfort does not change. It does not resolve with rest.  He denies any orthopnea or PND. He denies any palpitations or dizziness. Review of Systems   Respiratory: Positive for shortness of breath. Cardiovascular: Positive for chest pain. Negative for palpitations, orthopnea, claudication, leg swelling and PND. All other systems reviewed and are negative. Physical Exam   Constitutional: He is oriented to person, place, and time. He appears well-developed and well-nourished. HENT:   Head: Normocephalic. Eyes: Conjunctivae are normal.   Neck: Neck supple.  No JVD present. Carotid bruit is not present. No thyromegaly present. Cardiovascular: Normal rate and regular rhythm. No murmur heard. Pulmonary/Chest: Breath sounds normal.   Abdominal: Bowel sounds are normal.   Musculoskeletal: He exhibits no edema. Neurological: He is alert and oriented to person, place, and time. Skin: Skin is warm and dry. Nursing note and vitals reviewed. PCP: Chinmay Balderas MD    Past Medical History:   Diagnosis Date    Cardiac cath 09/16/2016    L dom. LM patent. pLAD 70% FFR 0.74 (2.75 x 23-mm Xience ERIC). mD 95% (2.25 x 12-mm Resolute ERIC). Cx patent. RCA patent. LVEDP 4.  EF 60%. Mild anterior RWMA. RA 7.  PA 20/5. RV 19/1. W 9.  CO/CI:  6.2/3.2 (TD); 6.5/3.4 (Hugh).  Chest pain     Coronary artery disease     2 stents placed 9/16/2016    Hematuria     Hypertension     Solitary kidney        Past Surgical History:   Procedure Laterality Date    CARDIAC CATHETERIZATION  9/16/2016         CORONARY ARTERY ANGIOGRAM  9/16/2016         CORONARY STENT SINGLE W/PTCA  9/16/2016         FRACTIONAL FLOW RESERVE  9/16/2016         HX APPENDECTOMY  1958    HX MOHS PROCEDURES Bilateral 1993    HX NEPHRECTOMY Left 2001    HX TONSILLECTOMY  1951    LV ANGIOGRAPHY  9/16/2016         RT & LT HEART WITH C.O.  9/16/2016            Current Outpatient Prescriptions   Medication Sig Dispense Refill    metoprolol succinate (TOPROL-XL) 25 mg XL tablet Take 1 Tab by mouth daily. 90 Tab 3    clopidogrel (PLAVIX) 75 mg tab Take 1 Tab by mouth daily. 90 Tab 3    atorvastatin (LIPITOR) 40 mg tablet Take 1 Tab by mouth daily. 90 Tab 3    alfuzosin SR (UROXATRAL) 10 mg SR tablet Take 1 Tab by mouth daily (after dinner). 90 Tab 3    doxycycline (MONODOX) 100 mg capsule Take 100 mg by mouth two (2) times a day.  aspirin 81 mg chewable tablet Take 81 mg by mouth daily.  glucosamine 1,000 mg tab Take 1,500 mg by mouth daily.       loratadine (ALLERCLEAR) 10 mg tablet Take 10 mg by mouth daily.  docusate sodium (STOOL SOFTENER) 50 mg capsule Take 50 mg by mouth two (2) times a day.  multivitamin (ONE A DAY) tablet Take 1 Tab by mouth daily.  diclofenac EC (VOLTAREN) 75 mg EC tablet Take 75 mg by mouth two (2) times a day.  diclofenac (VOLTAREN) 1 % gel Apply 2 g to affected area two (2) times a day.          The patient has a family history of    Social History   Substance Use Topics    Smoking status: Never Smoker    Smokeless tobacco: Never Used    Alcohol use No       Lab Results   Component Value Date/Time    Cholesterol, total 143 09/19/2016 05:09 AM    HDL Cholesterol 38 (L) 09/19/2016 05:09 AM    LDL, calculated 85 09/19/2016 05:09 AM    Triglyceride 100 09/19/2016 05:09 AM    CHOL/HDL Ratio 3.8 09/19/2016 05:09 AM        BP Readings from Last 3 Encounters:   02/27/18 110/82   02/12/18 130/80   07/25/17 118/62        Pulse Readings from Last 3 Encounters:   02/27/18 77   07/25/17 81   05/05/17 61       Wt Readings from Last 3 Encounters:   02/27/18 87.5 kg (193 lb)   02/12/18 86.2 kg (190 lb)   07/25/17 87.1 kg (192 lb)         EKG: unchanged from previous tracings, normal sinus rhythm, Q waves in V 1.

## 2018-03-06 ENCOUNTER — HOSPITAL ENCOUNTER (OUTPATIENT)
Dept: NON INVASIVE DIAGNOSTICS | Age: 68
Discharge: HOME | End: 2018-03-06
Attending: INTERNAL MEDICINE
Payer: MEDICARE

## 2018-03-06 ENCOUNTER — TELEPHONE (OUTPATIENT)
Dept: CARDIOLOGY CLINIC | Age: 68
End: 2018-03-06

## 2018-03-06 DIAGNOSIS — Z95.5 S/P CORONARY ARTERY STENT PLACEMENT: ICD-10-CM

## 2018-03-06 DIAGNOSIS — I25.110 CORONARY ARTERY DISEASE INVOLVING NATIVE CORONARY ARTERY OF NATIVE HEART WITH UNSTABLE ANGINA PECTORIS (HCC): ICD-10-CM

## 2018-03-06 DIAGNOSIS — R07.89 ATYPICAL CHEST PAIN: ICD-10-CM

## 2018-03-06 LAB
ATTENDING PHYSICIAN, CST07: NORMAL
DIAGNOSIS, 93000: NORMAL
DUKE TM SCORE RESULT, CST14: NORMAL
DUKE TREADMILL SCORE, CST13: NORMAL
ECG INTERP BEFORE EX, CST11: NORMAL
ECG INTERP DURING EX, CST12: NORMAL
FUNCTIONAL CAPACITY, CST17: NORMAL
KNOWN CARDIAC CONDITION, CST08: NORMAL
MAX. DIASTOLIC BP, CST04: 80 MMHG
MAX. HEART RATE, CST05: 136 BPM
MAX. SYSTOLIC BP, CST03: 160 MMHG
OVERALL BP RESPONSE TO EXERCISE, CST16: NORMAL
OVERALL HR RESPONSE TO EXERCISE, CST15: NORMAL
PEAK EX METS, CST10: 1.5 METS
PROTOCOL NAME, CST01: NORMAL
TEST INDICATION, CST09: NORMAL

## 2018-03-06 PROCEDURE — 93017 CV STRESS TEST TRACING ONLY: CPT | Performed by: INTERNAL MEDICINE

## 2018-03-06 PROCEDURE — 74011250636 HC RX REV CODE- 250/636: Performed by: INTERNAL MEDICINE

## 2018-03-06 PROCEDURE — 78452 HT MUSCLE IMAGE SPECT MULT: CPT | Performed by: INTERNAL MEDICINE

## 2018-03-06 PROCEDURE — A9500 TC99M SESTAMIBI: HCPCS

## 2018-03-06 RX ORDER — SODIUM CHLORIDE 0.9 % (FLUSH) 0.9 %
10 SYRINGE (ML) INJECTION AS NEEDED
Status: COMPLETED | OUTPATIENT
Start: 2018-03-06 | End: 2018-03-06

## 2018-03-06 RX ADMIN — Medication 10 ML: at 08:15

## 2018-03-06 RX ADMIN — REGADENOSON 0.4 MG: 0.08 INJECTION, SOLUTION INTRAVENOUS at 09:25

## 2018-03-06 RX ADMIN — Medication 10 ML: at 09:25

## 2018-03-06 NOTE — TELEPHONE ENCOUNTER
Patient called back. Verified  and full name. Informed about results per Dr Zoey Vernon. Patient verbalized understanding and agreed with the plan of care.

## 2018-03-06 NOTE — PROCEDURES
Colby Ramirez STRESS    Norma Rausch.  MR#: [id number]  : 1950  ACCOUNT #: [de-identified]   DATE OF SERVICE: 2018    PROCEDURE PERFORMED:  Pharmacologic nuclear scan. Baseline electrocardiogram shows normal sinus rhythm. Patient has an IV in the left arm. He received Lexiscan per protocol. He tolerated the procedure well. No chest pain was noted. He received resting dose of sestamibi 10.8 mCi at 8:15 a.m. He received stress dose of sestamibi 33.0 mCi at 9:25 a.m. TREADMILL FINDINGS:  1.  ST segment changes:  None. 2.  Chest pain:  None. 3.  Dysrhythmia:  None. 4.  Both heart rate and blood pressure response are normal.    TREADMILL CONCLUSION:  This is a negative pharmacologic stress test from electrocardiographic standpoint. MYOCARDIAL NUCLEAR PERFUSION STUDY FINDINGS:  1. Perfusion imaging shows no evidence of significant ongoing ischemia or prior infarction. 2.  Gated SPECT imaging shows normal left ventricular chamber size and function with estimated ejection fraction of 65%. No obvious regional wall motion abnormalities noted. CONCLUSIONS:  1.  Negative pharmacologic stress test from electrocardiographic standpoint. 2.  Normal perfusion study. 3.  Perfusion imaging shows no evidence of significant ongoing ischemia or prior infarction. 4.  Gated SPECT imaging shows normal left ventricular chamber size and function with estimated ejection fraction of 65%. No obvious regional wall motion abnormalities noted. 5.  This is a low risk finding.       MD Misty Arana  D: 2018 13:32     T: 2018 14:33  JOB #: [id number]  CC: Terra Joy MD

## 2018-03-06 NOTE — PROGRESS NOTES
Patient was injected with 79.4 millicuries 25VYT Sestamibi on 3/6/18 at 0815. Patient was injected with 28.0 millicuries 79TRQ Sestamibi on 3/6/18 at 0925. Patient's armbands were removed and placed in shred-it box.     Patient had a Nuclear Lexiscan Stress Test.

## 2018-03-06 NOTE — TELEPHONE ENCOUNTER
Dr Cheryl Wilson reviewed NM stress test.   Dr Cheryl Wilson wants to tell patient that it was unremarkable. Patient is to follow up in aug 2018 as scheduled and to continue current medications. Attempted to contact. Left VM.

## 2018-12-04 ENCOUNTER — OFFICE VISIT (OUTPATIENT)
Dept: CARDIOLOGY CLINIC | Age: 68
End: 2018-12-04

## 2018-12-04 VITALS — OXYGEN SATURATION: 98 % | WEIGHT: 201 LBS | BODY MASS INDEX: 31.55 KG/M2 | HEIGHT: 67 IN

## 2018-12-04 DIAGNOSIS — I25.110 CORONARY ARTERY DISEASE INVOLVING NATIVE CORONARY ARTERY OF NATIVE HEART WITH UNSTABLE ANGINA PECTORIS (HCC): Primary | ICD-10-CM

## 2018-12-04 NOTE — PROGRESS NOTES
HISTORY OF PRESENT ILLNESS Jono Tobin is a 77 y.o. male. ASSESSMENT and PLAN Mr. Rian Hauser has history of CAD. He had exertional chest pains noted back in the summer of 2016. [facility] had echocardiogram and nuclear stress study at [facility] and was told that he had no significant CAD. Two weeks later, with accelerating exertional angina, he was seen by cardiology.  At that time, with his classic symptoms, he was scheduled for coronary angiography, which  Revealed significant, long LAD as well as diagonal disease. He underwent complex LAD diagonal stent procedure with resolution of his symptoms. He also has history of hematuria. He was recently diagnosed with bladder tumor; surgical biopsy is planned. There is concern for possible malignancy. 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 He also only has one kidney with normal kidney function. His nuclear scan in March 2018 was normal. 
 
? CAD:   Symptomatically stable. 
? BP:   Well-controlled. ? HR:    Stable. ? CHF:   There is no evidence of decompensated CHF noted. ? Weight:   His weight today is 201 pounds. His baseline weight is 185 pounds. Again, strong encouragement was given for weight control. ? Cholesterol:   Target LDL <70. Remains on Lipitor 40. 
? Anti-platelet:   Remains on ASA, and Plavix. He is concerned about his girlfriend with recent diagnosis of skin lesion as melanoma. I will see him back in 6-9 months. Thank you. Encounter Diagnoses Name Primary?  Coronary artery disease involving native coronary artery of native heart with unstable angina pectoris ([address] Yes  
 
current treatment plan is effective, no change in therapy 
lab results and schedule of future lab studies reviewed with patient 
reviewed diet, exercise and weight control 
cardiovascular risk and specific lipid/LDL goals reviewed use of aspirin to prevent MI and TIA's discussed HPI Today, Mr. Samira Hatch has no complaints of chest pains, increased shortness of breath or decreased activity levels. He remains active physically. He denies any changes in his activities. He denies any orthopnea or PND. He denies any palpitations or dizziness. Unfortunately, over the last 9 months, he has put on about 10 pounds. Review of Systems Respiratory: Negative for shortness of breath. Cardiovascular: Negative for chest pain, palpitations, orthopnea, claudication, leg swelling and PND. All other systems reviewed and are negative. Physical Exam  
Constitutional: He is oriented to person, place, and time. He appears well-developed and well-nourished. HENT:  
Head: Normocephalic. Eyes: Conjunctivae are normal.  
Neck: Neck supple. No JVD present. Carotid bruit is not present. No thyromegaly present. Cardiovascular: Normal rate and regular rhythm. Pulmonary/Chest: Breath sounds normal.  
Abdominal: Bowel sounds are normal.  
Musculoskeletal: He exhibits no edema. Neurological: He is alert and oriented to person, place, and time. Skin: Skin is warm and dry. Nursing note and vitals reviewed. PCP: Sadaf Huntley MD 
 
Past Medical History:  
Diagnosis Date  Cardiac cath 09/16/2016 L dom. LM patent. pLAD 70% FFR 0.74 (2.75 x 23-mm Xience ERIC). mD 95% (2.25 x 12-mm Resolute ERIC). Cx patent. RCA patent. LVEDP 4.  EF 60%. Mild anterior RWMA. RA 7.  PA 20/5. RV 19/1. W 9.  CO/CI:  6.2/3.2 (TD); 6.5/3.4 (Hugh).  Chest pain  Coronary artery disease 2 stents placed 9/16/2016  Hematuria  Hypertension  Solitary kidney Past Surgical History:  
Procedure Laterality Date  CARDIAC CATHETERIZATION  9/16/2016  CORONARY ARTERY ANGIOGRAM  9/16/2016  CORONARY STENT SINGLE W/PTCA  9/16/2016  FRACTIONAL FLOW RESERVE  9/16/2016 [facility]  HX MOHS PROCEDURES Bilateral 1993  HX NEPHRECTOMY Left 2001 [facility]  LV ANGIOGRAPHY  9/16/2016  RT & LT HEART WITH C.O.  9/16/2016 Current Outpatient Medications Medication Sig Dispense Refill  metoprolol succinate (TOPROL-XL) 25 mg XL tablet Take 1 Tab by mouth daily. 90 Tab 3  clopidogrel (PLAVIX) 75 mg tab Take 1 Tab by mouth daily. 90 Tab 3  
 atorvastatin (LIPITOR) 40 mg tablet Take 1 Tab by mouth daily. 90 Tab 3  
 alfuzosin SR (UROXATRAL) 10 mg SR tablet Take 1 Tab by mouth daily (after dinner). 90 Tab 3  
 doxycycline (MONODOX) 100 mg capsule Take 100 mg by mouth two (2) times a day.  aspirin 81 mg chewable tablet Take 81 mg by mouth daily.  glucosamine 1,000 mg tab Take 1,500 mg by mouth daily.  loratadine (ALLERCLEAR) 10 mg tablet Take 10 mg by mouth daily.  docusate sodium (STOOL SOFTENER) 50 mg capsule Take 50 mg by mouth two (2) times a day.  multivitamin (ONE A DAY) tablet Take 1 Tab by mouth daily.  diclofenac EC (VOLTAREN) 75 mg EC tablet Take 75 mg by mouth two (2) times a day.  diclofenac (VOLTAREN) 1 % gel Apply 2 g to affected area two (2) times a day. The patient has a family history of 
 
Social History Tobacco Use  Smoking status: Never Smoker  Smokeless tobacco: Never Used Substance Use Topics  Alcohol use: No  
 Drug use: No  
 
 
Lab Results Component Value Date/Time Cholesterol, total 143 09/19/2016 05:09 AM  
 HDL Cholesterol 38 (L) 09/19/2016 05:09 AM  
 LDL, calculated 85 09/19/2016 05:09 AM  
 Triglyceride 100 09/19/2016 05:09 AM  
 CHOL/HDL Ratio 3.8 09/19/2016 05:09 AM  
  
 
BP Readings from Last 3 Encounters:  
02/27/18 110/82  
02/12/18 130/80  
07/25/17 118/62 Pulse Readings from Last 3 Encounters:  
02/27/18 77  
07/25/17 81  
05/05/17 61 Wt Readings from Last 3 Encounters:  
12/04/18 91.2 kg (201 lb)  
02/27/18 87.5 kg (193 lb) 02/12/18 86.2 kg (190 lb) EKG: normal EKG, normal sinus rhythm, unchanged from previous tracings.

## 2018-12-04 NOTE — PROGRESS NOTES
Micha Newton presents today for Chief Complaint Patient presents with  Coronary Artery Disease 6 month follow up - no cardiac complaints Micha Newton preferred language for health care discussion is english/other. Is someone accompanying this pt? Wife Is the patient using any DME equipment during OV? No  
 
Depression Screening: No flowsheet data found. Learning Assessment: 
Learning Assessment 7/25/2017 PRIMARY LEARNER Patient PRIMARY LANGUAGE ENGLISH  
LEARNER PREFERENCE PRIMARY DEMONSTRATION  
ANSWERED BY Patient RELATIONSHIP SELF Abuse Screening: No flowsheet data found. Fall Risk No flowsheet data found. Pt currently taking Anticoagulant therapy? ASA 81mg daily & Plavix Coordination of Care: 1. Have you been to the ER, urgent care clinic since your last visit? Hospitalized since your last visit? No  
 
2. Have you seen or consulted any other health care providers outside of the [address] [place] [address] since your last visit? Include any pap smears or colon screening.  No

## 2018-12-31 DIAGNOSIS — I25.110 CORONARY ARTERY DISEASE WITH UNSTABLE ANGINA PECTORIS, UNSPECIFIED VESSEL OR LESION TYPE, UNSPECIFIED WHETHER NATIVE OR TRANSPLANTED HEART (HCC): Primary | ICD-10-CM

## 2019-01-03 ENCOUNTER — HOSPITAL ENCOUNTER (OUTPATIENT)
Dept: NON INVASIVE DIAGNOSTICS | Age: 69
Discharge: HOME | End: 2019-01-03
Attending: INTERNAL MEDICINE
Payer: MEDICARE

## 2019-01-03 VITALS
DIASTOLIC BLOOD PRESSURE: 76 MMHG | SYSTOLIC BLOOD PRESSURE: 120 MMHG | WEIGHT: 201 LBS | HEIGHT: 67 IN | BODY MASS INDEX: 31.55 KG/M2

## 2019-01-03 DIAGNOSIS — I25.110 CORONARY ARTERY DISEASE WITH UNSTABLE ANGINA PECTORIS, UNSPECIFIED VESSEL OR LESION TYPE, UNSPECIFIED WHETHER NATIVE OR TRANSPLANTED HEART (HCC): ICD-10-CM

## 2019-01-03 LAB
STRESS BASELINE DIAS BP: 76 MMHG
STRESS BASELINE HR: 77 BPM
STRESS BASELINE SYS BP: 120 MMHG
STRESS ESTIMATED WORKLOAD: 1.5 METS
STRESS EXERCISE DUR MIN: NORMAL
STRESS PEAK DIAS BP: 70 MMHG
STRESS PEAK SYS BP: 158 MMHG
STRESS PERCENT HR ACHIEVED: 107 %
STRESS POST PEAK HR: 136 BPM
STRESS RATE PRESSURE PRODUCT: NORMAL BPM*MMHG
STRESS ST DEPRESSION: 0 MM
STRESS ST ELEVATION: 0 MM
STRESS TARGET HR: 128 BPM

## 2019-01-03 PROCEDURE — 93017 CV STRESS TEST TRACING ONLY: CPT

## 2019-01-03 PROCEDURE — 74011250636 HC RX REV CODE- 250/636: Performed by: INTERNAL MEDICINE

## 2019-01-03 RX ORDER — SODIUM CHLORIDE 9 MG/ML
250 INJECTION, SOLUTION INTRAVENOUS ONCE
Status: COMPLETED | OUTPATIENT
Start: 2019-01-03 | End: 2019-01-03

## 2019-01-03 RX ORDER — SODIUM CHLORIDE 0.9 % (FLUSH) 0.9 %
10 SYRINGE (ML) INJECTION AS NEEDED
Status: COMPLETED | OUTPATIENT
Start: 2019-01-03 | End: 2019-01-03

## 2019-01-03 RX ADMIN — REGADENOSON 0.4 MG: 0.08 INJECTION, SOLUTION INTRAVENOUS at 09:40

## 2019-01-03 RX ADMIN — Medication 10 ML: at 08:10

## 2019-01-03 RX ADMIN — SODIUM CHLORIDE 250 ML/HR: 900 INJECTION, SOLUTION INTRAVENOUS at 09:40

## 2019-01-03 NOTE — PROGRESS NOTES
Patient was injected with 93.6 millicuries 31PGF Sestamibi on 1/3/19 at 0810. Patient was injected with 35.6 millicuries 29PWY Sestamibi on  1/3/19 at 0940. Patient's armbands were removed and placed in shred-it box.  
 
Patient had a Nuclear Lexiscan Stress Test.

## 2019-01-04 NOTE — PROGRESS NOTES
Per your last note \" He had exertional chest pains noted back in the summer of 2016. He had echocardiogram and nuclear stress study at [facility] and was told that he had no significant CAD. Two weeks later, with accelerating exertional angina, he was seen by cardiology. At that time, with his classic symptoms, he was scheduled for coronary angiography, which  Revealed significant, long LAD as well as diagonal disease. He underwent complex LAD diagonal stent procedure with resolution of his symptoms.

## 2019-01-11 ENCOUNTER — TELEPHONE (OUTPATIENT)
Dept: CARDIOLOGY CLINIC | Age: 69
End: 2019-01-11

## 2019-01-11 NOTE — TELEPHONE ENCOUNTER
----- Message from Rosibel Chapa RN sent at 1/10/2019 12:42 PM EST ----- 
 
 
----- Message ----- From: Surekah Roldan MD 
Sent: 1/8/2019  12:55 PM 
To: Rosibel Chapa RN Please let the patient know that his nuclear scan is unremarkable and low risk. 
----- Message ----- From: Tiny Dillon RN Sent: 1/4/2019   9:51 AM 
To: Claudell Coward, MD 
 
Per your last note \" He had exertional chest pains noted back in the summer of 2016. He had echocardiogram and nuclear stress study at [facility] and was told that he had no significant CAD. Two weeks later, with accelerating exertional angina, he was seen by cardiology. At that time, with his classic symptoms, he was scheduled for coronary angiography, which  Revealed significant, long LAD as well as diagonal disease. He underwent complex LAD diagonal stent procedure with resolution of his symptoms.

## 2019-02-25 RX ORDER — METOPROLOL SUCCINATE 25 MG/1
TABLET, EXTENDED RELEASE ORAL
Qty: 90 TAB | Refills: 3 | Status: SHIPPED | OUTPATIENT
Start: 2019-02-25 | End: 2020-02-17

## 2019-02-25 RX ORDER — CLOPIDOGREL BISULFATE 75 MG/1
TABLET ORAL
Qty: 90 TAB | Refills: 3 | Status: SHIPPED | OUTPATIENT
Start: 2019-02-25 | End: 2020-03-03

## 2019-03-18 ENCOUNTER — OFFICE VISIT (OUTPATIENT)
Dept: CARDIOLOGY CLINIC | Age: 69
End: 2019-03-18

## 2019-03-18 VITALS
HEART RATE: 71 BPM | DIASTOLIC BLOOD PRESSURE: 74 MMHG | OXYGEN SATURATION: 97 % | SYSTOLIC BLOOD PRESSURE: 136 MMHG | HEIGHT: 67 IN | BODY MASS INDEX: 31.39 KG/M2 | WEIGHT: 200 LBS

## 2019-03-18 DIAGNOSIS — R42 DIZZINESS: Primary | ICD-10-CM

## 2019-03-18 DIAGNOSIS — E78.5 DYSLIPIDEMIA: ICD-10-CM

## 2019-03-18 DIAGNOSIS — I25.110 CORONARY ARTERY DISEASE INVOLVING NATIVE CORONARY ARTERY OF NATIVE HEART WITH UNSTABLE ANGINA PECTORIS (HCC): ICD-10-CM

## 2019-03-18 NOTE — PROGRESS NOTES
Lauri Rojas presents today for   Chief Complaint   Patient presents with    Coronary Artery Disease     wife requested appt for dizziness        Lauri Rojas preferred language for health care discussion is english/other. Is someone accompanying this pt? wife    Is the patient using any DME equipment during [address]? no    Depression Screening:  No flowsheet data found. Learning Assessment:  Learning Assessment 7/25/2017   PRIMARY LEARNER Patient   PRIMARY LANGUAGE ENGLISH   LEARNER PREFERENCE PRIMARY DEMONSTRATION   ANSWERED BY Patient   RELATIONSHIP SELF       Abuse Screening:  No flowsheet data found. Fall Risk  No flowsheet data found. Pt currently taking Anticoagulant therapy? ASA and Plavix     Coordination of Care:  1. Have you been to the ER, urgent care clinic since your last visit? Hospitalized since your last visit? no    2. Have you seen or consulted any other health care providers outside of the [address] [place] [address] since your last visit? Include any pap smears or colon screening.  no

## 2019-03-18 NOTE — PATIENT INSTRUCTIONS
Echocardiogram to be completed   Follow up with family doctor for routine physical  Schedule follow up in about 4 weeks (after echocardiogram completed)  Call sooner if needed

## 2019-03-18 NOTE — PROGRESS NOTES
HPI:  Suman Calvo is a 77 y.o. male presenting for 4-5 weeks of intermittent dizziness. He states that symptoms occur when he changes positions too quickly, and typically last for 30 seconds or less. He denies any lightheadedness or syncope, chest pains, palpitations, shortness of breath or GALAVIZ, orthopnea, PND, or lower extremity swelling. He states that he does have a history of vertigo when he was in his 27's and is unsure if his symptoms are similar. He denies any new changes to his medication regimen. He has a past medical history significant for CAD s/p cardiac catheterization with complex LAD and diagonal ERIC placement in August of 2016, dyslipidemia, hematuria with bladder tumor, atonic bladder requiring self cath, and a single kidney. Cardiac Imaging/Procedures:   His last nuclear stress test was completed on 01/03/2019 and revealed normal perfusions with an EF of 68%, supporting a low risk test.       PMH:  Past Medical History:   Diagnosis Date    Cardiac cath 09/16/2016    L dom. LM patent. pLAD 70% FFR 0.74 (2.75 x 23-mm Xience ERIC). mD 95% (2.25 x 12-mm Resolute ERIC). Cx patent. RCA patent. LVEDP 4.  EF 60%. Mild anterior RWMA. RA 7.  PA 20/5. RV 19/1. W 9.  CO/CI:  6.2/3.2 (TD); 6.5/3.4 (Hugh).       Chest pain     Coronary artery disease     2 stents placed 9/16/2016    Hematuria     Hypertension     Solitary kidney        PSH:  Past Surgical History:   Procedure Laterality Date    CARDIAC CATHETERIZATION  9/16/2016         CORONARY ARTERY ANGIOGRAM  9/16/2016         CORONARY STENT SINGLE W/PTCA  9/16/2016         FRACTIONAL FLOW RESERVE  9/16/2016         HX APPENDECTOMY  1958    HX MOHS PROCEDURES Bilateral 1993    HX NEPHRECTOMY Left 2001    HX TONSILLECTOMY  1951    LV ANGIOGRAPHY  9/16/2016         RT & LT HEART WITH C.O.  9/16/2016            MEDS:  Current Outpatient Medications on File Prior to Visit   Medication Sig Dispense Refill    clopidogrel (PLAVIX) 75 mg tab TAKE 1 TABLET BY MOUTH ONCE DAILY 90 Tab 3    metoprolol succinate (TOPROL-XL) 25 mg XL tablet TAKE 1 TABLET BY MOUTH ONCE DAILY 90 Tab 3    alfuzosin SR (UROXATRAL) 10 mg SR tablet TAKE ONE TABLET BY MOUTH ONCE DAILY AFTER SUPPER 90 Tab 0    atorvastatin (LIPITOR) 40 mg tablet Take 1 Tab by mouth daily. 90 Tab 3    doxycycline (MONODOX) 100 mg capsule Take 100 mg by mouth two (2) times a day.  aspirin 81 mg chewable tablet Take 81 mg by mouth daily.  glucosamine 1,000 mg tab Take 1,500 mg by mouth daily.  loratadine (ALLERCLEAR) 10 mg tablet Take 10 mg by mouth daily.  docusate sodium (STOOL SOFTENER) 50 mg capsule Take 50 mg by mouth two (2) times a day.  multivitamin (ONE A DAY) tablet Take 1 Tab by mouth daily.  diclofenac EC (VOLTAREN) 75 mg EC tablet Take 75 mg by mouth two (2) times a day.  diclofenac (VOLTAREN) 1 % gel Apply 2 g to affected area two (2) times a day. No current facility-administered medications on file prior to visit. Allergies and Sensitivities:  No Known Allergies    Family History:  No family history on file. Social History:  He  reports that  has never smoked. he has never used smokeless tobacco.  He  reports that he does not drink alcohol. Review of Systems:    Constitutional: negative for fevers, chills, sweats, fatigue and malaise  Respiratory: negative for cough  Cardiovascular: negative for chest pain, palpitations, syncope, dyspnea on exertion, SOB, PND, orthopnea  Gastrointestinal: negative for nausea, vomiting, diarrhea, melena and abdominal pain  Genitourinary: negative for hematuria  Musculoskeletal: negative for lower extremity swelling or claudication   Neurological: Positive for dizziness. Negative for weakness.    Behavioral/Psych: negative for anxiety     Physical:  Vitals:  Visit Vitals  /74   Pulse 71   Ht 5' 7\" (1.702 m)   Wt 90.7 kg (200 lb)   SpO2 97%   BMI 31.32 kg/m²     Orthostatic VS:   Layin/74 mmHg, 71 bpm  Sittin/74 mmHg, 71 bpm  Standin/86 mmHg, 81 bpm    Exam:     General appearance: no apparent distress  HEENT: normocephalic, atraumatic  Neck: supple, no JVD, no carotid bruits   Respiratory: clear to auscultation bilaterally  Cardiovascular: normal S1 and S2,  regular rate and rhythm, no murmurs  Abdomen: soft, non-tender, no hepatomegaly  Extremities: no lower extremity edema   Neurological: alert and oriented to person, place and time  Behavioral/Psych: normal mood and affect       Data:  EKG:    LABS:  Lab Results   Component Value Date/Time    Sodium 144 2017 05:50 PM    Potassium 3.9 2017 05:50 PM    Chloride 106 2017 05:50 PM    CO2 30 2017 05:50 PM    Glucose 135 (H) 2017 05:50 PM    BUN 28 (H) 2017 05:50 PM    Creatinine 0.99 2017 05:50 PM     Lab Results   Component Value Date/Time    Cholesterol, total 143 2016 05:09 AM    HDL Cholesterol 38 (L) 2016 05:09 AM    LDL, calculated 85 2016 05:09 AM    Triglyceride 100 2016 05:09 AM    CHOL/HDL Ratio 3.8 2016 05:09 AM     Lab Results   Component Value Date/Time    ALT (SGPT) 35 2017 05:50 PM         Impression/Plan:  1. Dizziness  2. CAD, s/p ERIC to LAD and diagonal in 2016, stable  3. Dyslipidemia, on atorvastatin 40 mg    Patient with 4-5 weeks of positional dizziness lasting about 30 seconds at a time. Patient reports no other associated symptoms. Orthostatic vital signs completed today and do not show orthostasis. He had a normal nuclear stress test in 2019 as reported above. I will also order an echocardiogram for completeness. No changes were made to his cardiac regimen today as his vitals signs and ECG were all within normal limits. I discussed with patient and wife that symptoms sound more like benign positional vertigo rather than cardiac in nature.  I encouraged him to also schedule a follow up with his family practitioner as well for a routine physical exam. He will schedule a follow up with me after completion of his echocardiogram. I advised that it would be okay to wait about 4 weeks for follow up as he and his wife travel from [place]. All questions answered.        BETY Middleton

## 2019-03-21 ENCOUNTER — HOSPITAL ENCOUNTER (OUTPATIENT)
Dept: NON INVASIVE DIAGNOSTICS | Age: 69
Discharge: HOME | End: 2019-03-21
Attending: PHYSICIAN ASSISTANT
Payer: MEDICARE

## 2019-03-21 VITALS
SYSTOLIC BLOOD PRESSURE: 136 MMHG | HEIGHT: 72 IN | BODY MASS INDEX: 27.09 KG/M2 | DIASTOLIC BLOOD PRESSURE: 74 MMHG | WEIGHT: 200 LBS

## 2019-03-21 DIAGNOSIS — R42 DIZZINESS: ICD-10-CM

## 2019-03-21 DIAGNOSIS — I25.110 CORONARY ARTERY DISEASE INVOLVING NATIVE CORONARY ARTERY OF NATIVE HEART WITH UNSTABLE ANGINA PECTORIS (HCC): ICD-10-CM

## 2019-03-21 LAB
ECHO AV REGURGITANT PHT: 546 CM
ECHO LA VOL 2C: 59 ML (ref 18–58)
ECHO LA VOL 4C: 52 ML (ref 18–58)
ECHO LA VOL BP: 59 ML (ref 18–58)
ECHO LA VOL/BSA BIPLANE: 27.69 ML/M2 (ref 16–28)
ECHO LA VOLUME INDEX A2C: 27.69 ML/M2 (ref 16–28)
ECHO LA VOLUME INDEX A4C: 24.41 ML/M2 (ref 16–28)
ECHO LV E' LATERAL VELOCITY: 6.24 CM/S
ECHO LV E' SEPTAL VELOCITY: 4.29 CM/S
ECHO LV EDV A2C: 83 ML
ECHO LV EDV A4C: 82 ML
ECHO LV EDV BP: 83 ML (ref 67–155)
ECHO LV EDV INDEX A4C: 38.5 ML/M2
ECHO LV EDV INDEX BP: 39 ML/M2
ECHO LV EDV NDEX A2C: 39 ML/M2
ECHO LV EJECTION FRACTION A2C: 58 %
ECHO LV EJECTION FRACTION A4C: 55 %
ECHO LV EJECTION FRACTION BIPLANE: 57.8 % (ref 55–100)
ECHO LV ESV A2C: 35 ML
ECHO LV ESV A4C: 37 ML
ECHO LV ESV BP: 35 ML (ref 22–58)
ECHO LV ESV INDEX A2C: 16.4 ML/M2
ECHO LV ESV INDEX A4C: 17.4 ML/M2
ECHO LV ESV INDEX BP: 16.4 ML/M2
ECHO LV INTERNAL DIMENSION DIASTOLIC: 3.9 CM (ref 4.2–5.9)
ECHO LV INTERNAL DIMENSION SYSTOLIC: 2.9 CM
ECHO LV IVSD: 1.1 CM (ref 0.6–1)
ECHO LV MASS 2D: 160.8 G (ref 88–224)
ECHO LV MASS INDEX 2D: 75.5 G/M2 (ref 49–115)
ECHO LV POSTERIOR WALL DIASTOLIC: 1.1 CM (ref 0.6–1)
ECHO LVOT DIAM: 2.4 CM
ECHO LVOT PEAK GRADIENT: 2.9 MMHG
ECHO LVOT PEAK VELOCITY: 85.1 CM/S
ECHO LVOT VTI: 20.4 CM
ECHO MV A VELOCITY: 114 CM/S
ECHO MV E DECELERATION TIME (DT): 327 MS
ECHO MV E VELOCITY: 58.1 CM/S
ECHO MV E/A RATIO: 0.51
ECHO MV E/E' LATERAL: 9.31
ECHO MV E/E' RATIO (AVERAGED): 11.43
ECHO MV E/E' SEPTAL: 13.54
ECHO PULMONARY ARTERY SYSTOLIC PRESSURE (PASP): 19 MMHG
ECHO TV REGURGITANT MAX VELOCITY: 200 CM/S
ECHO TV REGURGITANT PEAK GRADIENT: 16 MMHG
PISA AR MAX VEL: 410 CM/S

## 2019-03-21 PROCEDURE — 93306 TTE W/DOPPLER COMPLETE: CPT

## 2019-03-21 RX ORDER — ATORVASTATIN CALCIUM 40 MG/1
TABLET, FILM COATED ORAL
Qty: 90 TAB | Refills: 3 | Status: SHIPPED | OUTPATIENT
Start: 2019-03-21 | End: 2020-03-27

## 2019-03-21 NOTE — PROGRESS NOTES
Completed 2D Echocardiogram. Report to follow. Patient advised that the armband contains PHI. I cut the armband off and shred it.   
 
 
Janette Lawrence, RCS, RDCS

## 2019-09-11 ENCOUNTER — IMPORTED ENCOUNTER (OUTPATIENT)
Dept: URBAN - NONMETROPOLITAN AREA CLINIC 1 | Facility: CLINIC | Age: 69
End: 2019-09-11

## 2019-09-11 PROCEDURE — 92014 COMPRE OPH EXAM EST PT 1/>: CPT

## 2019-12-03 ENCOUNTER — OFFICE VISIT (OUTPATIENT)
Dept: CARDIOLOGY CLINIC | Age: 69
End: 2019-12-03

## 2019-12-03 VITALS
HEART RATE: 68 BPM | DIASTOLIC BLOOD PRESSURE: 88 MMHG | OXYGEN SATURATION: 98 % | SYSTOLIC BLOOD PRESSURE: 130 MMHG | WEIGHT: 196 LBS | HEIGHT: 72 IN | BODY MASS INDEX: 26.55 KG/M2

## 2019-12-03 DIAGNOSIS — I25.110 CORONARY ARTERY DISEASE INVOLVING NATIVE CORONARY ARTERY OF NATIVE HEART WITH UNSTABLE ANGINA PECTORIS (HCC): ICD-10-CM

## 2019-12-03 DIAGNOSIS — E78.5 DYSLIPIDEMIA: ICD-10-CM

## 2019-12-03 DIAGNOSIS — I25.110 CORONARY ARTERY DISEASE WITH UNSTABLE ANGINA PECTORIS, UNSPECIFIED VESSEL OR LESION TYPE, UNSPECIFIED WHETHER NATIVE OR TRANSPLANTED HEART (HCC): Primary | ICD-10-CM

## 2019-12-03 DIAGNOSIS — R42 DIZZINESS: ICD-10-CM

## 2019-12-03 DIAGNOSIS — R07.9 CHEST PAIN, UNSPECIFIED TYPE: ICD-10-CM

## 2019-12-03 NOTE — PROGRESS NOTES
Shayyhui Hutton presents today for   Chief Complaint   Patient presents with    Coronary Artery Disease     1 year follow up     Arm Pain     left arm pain with hand numbess x4 days        Shayy Hutton preferred language for health care discussion is english/other. Is someone accompanying this pt? Wife     Is the patient using any DME equipment during [address]? no    Depression Screening:  3 most recent PHQ Screens 12/3/2019   Little interest or pleasure in doing things Not at all   Feeling down, depressed, irritable, or hopeless Not at all   Total Score PHQ 2 0       Learning Assessment:  Learning Assessment 12/3/2019   PRIMARY LEARNER Patient   PRIMARY LANGUAGE ENGLISH   LEARNER PREFERENCE PRIMARY DEMONSTRATION   ANSWERED BY Patient   RELATIONSHIP SELF       Abuse Screening:  Abuse Screening Questionnaire 12/3/2019   Do you ever feel afraid of your partner? N   Are you in a relationship with someone who physically or mentally threatens you? N   Is it safe for you to go home? Y       Fall Risk  Fall Risk Assessment, last 12 mths 12/3/2019   Able to walk? Yes   Fall in past 12 months? No       Pt currently taking Anticoagulant therapy? ASA 81mg and Plavix     Coordination of Care:  1. Have you been to the ER, urgent care clinic since your last visit? Hospitalized since your last visit? no    2. Have you seen or consulted any other health care providers outside of the [address] [place] [address] since your last visit? Include any pap smears or colon screening.  no

## 2019-12-03 NOTE — PROGRESS NOTES
HISTORY OF PRESENT ILLNESS  Dirk Garza is a 68 y.o. male. ASSESSMENT and PLAN    Mr. Sadie Madrid has history of CAD. He had exertional chest pains noted back in the summer of 2016. [facility] had echocardiogram and nuclear stress study at [facility] and was told that he had no significant CAD. Two weeks later, with accelerating exertional angina, he was seen by cardiology.  At that time, with his classic symptoms, he was scheduled for coronary angiography, which revealed significant, long LAD as well as diagonal disease. He underwent complex LAD diagonal stent procedure with resolution of his symptoms. He also has history of hematuria. He was recently diagnosed with bladder tumor; surgical biopsy is planned. There is concern for possible malignancy.  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  He also only has one kidney with normal kidney function. His nuclear scan in March 2018 was normal.  His nuclear scan back in January 2019 showed low risk findings with normal perfusion and EF of 60%. His echocardiogram in March 2019 showed normal left atrial chamber size and function without significant valvular disease. · CAD:    Symptomatically stable. · BP:   Well controlled. · HR:    Stable. · CHF:    There is no evidence of decompensated CHF noted. · Weight:    His weight today is 196 pounds. He has lost about 5 pounds since last December. However, his baseline weight is 185 pounds. I did encourage him to try to get down to 185 pounds. · Cholesterol:   Target LDL <70. Remains on Lipitor 40. · Anti-platelet:   Remains on ASA, and Plavix.       His girlfriend was diagnosed with melanoma in 2018; she had this treated with clear margins. I will see him back in 6-9 months. Thank you. Encounter Diagnoses   Name Primary?     Coronary artery disease with unstable angina pectoris, unspecified vessel or lesion type, unspecified whether native or transplanted heart ([facility] [address] Yes    Coronary artery disease involving native coronary artery of native heart with unstable angina pectoris ([facility] [address]     Dizziness     Dyslipidemia     Chest pain, unspecified type      current treatment plan is effective, no change in therapy  lab results and schedule of future lab studies reviewed with patient  reviewed diet, exercise and weight control  cardiovascular risk and specific lipid/LDL goals reviewed  use of aspirin to prevent MI and TIA's discussed        HPI   Today, Mr. Salma Montalvo has no complaints of chest pains, increased shortness of breath or decreased exercise capacity. He still has numbness and tingling involving the left fourth and fifth digits. There is no recurrent episodes of chest pains, or exertional chest pains as he had when he presented with ACS back in 2016. He remains active physically without significant limitations. He denies any orthopnea or PND. He denies any palpitations or dizziness. Review of Systems   Respiratory: Negative for shortness of breath. Cardiovascular: Negative for chest pain, palpitations, orthopnea, claudication, leg swelling and PND. All other systems reviewed and are negative. Physical Exam   Constitutional: He is oriented to person, place, and time. He appears well-developed and well-nourished. HENT:   Head: Normocephalic. Eyes: Conjunctivae are normal.   Neck: Neck supple. No JVD present. Carotid bruit is not present. No thyromegaly present. Cardiovascular: Normal rate and regular rhythm. Pulmonary/Chest: Breath sounds normal.   Abdominal: Bowel sounds are normal.   Musculoskeletal:         General: No edema. Neurological: He is alert and oriented to person, place, and time. Skin: Skin is warm and dry. Nursing note and vitals reviewed. PCP: Booker Ken MD    Past Medical History:   Diagnosis Date    Cardiac cath 09/16/2016    L dom. LM patent.   pLAD 70% FFR 0.74 (2.75 x 23-mm Xience ERIC). mD 95% (2.25 x 12-mm Resolute ERIC). Cx patent. RCA patent. LVEDP 4.  EF 60%. Mild anterior RWMA. RA 7.  PA 20/5. RV 19/1. W 9.  CO/CI:  6.2/3.2 (TD); 6.5/3.4 (Hugh).  Chest pain     Coronary artery disease     2 stents placed 9/16/2016    Hematuria     Hypertension     Solitary kidney        Past Surgical History:   Procedure Laterality Date    CARDIAC CATHETERIZATION  9/16/2016         CORONARY ARTERY ANGIOGRAM  9/16/2016         CORONARY STENT SINGLE W/PTCA  9/16/2016         FRACTIONAL FLOW RESERVE  9/16/2016         HX APPENDECTOMY  1958    HX MOHS PROCEDURES Bilateral 1993    HX NEPHRECTOMY Left 2001    HX TONSILLECTOMY  1951    LV ANGIOGRAPHY  9/16/2016         RT & LT HEART WITH C.O.  9/16/2016            Current Outpatient Medications   Medication Sig Dispense Refill    alfuzosin SR (UROXATRAL) 10 mg SR tablet TAKE 1 TABLET BY MOUTH ONCE DAILY AFTER SUPPER 90 Tab 3    atorvastatin (LIPITOR) 40 mg tablet TAKE 1 TABLET BY MOUTH ONCE DAILY 90 Tab 3    clopidogrel (PLAVIX) 75 mg tab TAKE 1 TABLET BY MOUTH ONCE DAILY 90 Tab 3    metoprolol succinate (TOPROL-XL) 25 mg XL tablet TAKE 1 TABLET BY MOUTH ONCE DAILY 90 Tab 3    doxycycline (MONODOX) 100 mg capsule Take 100 mg by mouth two (2) times a day.  aspirin 81 mg chewable tablet Take 81 mg by mouth daily.  glucosamine 1,000 mg tab Take 1,500 mg by mouth daily.  loratadine (ALLERCLEAR) 10 mg tablet Take 10 mg by mouth daily.  docusate sodium (STOOL SOFTENER) 50 mg capsule Take 50 mg by mouth two (2) times a day.  multivitamin (ONE A DAY) tablet Take 1 Tab by mouth daily.  diclofenac EC (VOLTAREN) 75 mg EC tablet Take 75 mg by mouth two (2) times a day.  diclofenac (VOLTAREN) 1 % gel Apply 2 g to affected area two (2) times a day.          The patient has a family history of    Social History     Tobacco Use    Smoking status: Never Smoker    Smokeless tobacco: Never Used   Substance Use Topics    Alcohol use: No    Drug use: No       Lab Results   Component Value Date/Time    Cholesterol, total 143 09/19/2016 05:09 AM    HDL Cholesterol 38 (L) 09/19/2016 05:09 AM    LDL, calculated 85 09/19/2016 05:09 AM    Triglyceride 100 09/19/2016 05:09 AM    CHOL/HDL Ratio 3.8 09/19/2016 05:09 AM        BP Readings from Last 3 Encounters:   12/03/19 130/88   03/21/19 136/74   03/18/19 136/74        Pulse Readings from Last 3 Encounters:   12/03/19 68   03/18/19 71   02/27/18 77       Wt Readings from Last 3 Encounters:   12/03/19 88.9 kg (196 lb)   03/21/19 90.7 kg (200 lb)   03/18/19 90.7 kg (200 lb)         EKG: normal EKG, normal sinus rhythm, unchanged from previous tracings.

## 2020-01-13 ENCOUNTER — HOSPITAL ENCOUNTER (OUTPATIENT)
Dept: NON INVASIVE DIAGNOSTICS | Age: 70
Discharge: HOME | End: 2020-01-13
Attending: INTERNAL MEDICINE
Payer: MEDICARE

## 2020-01-13 VITALS
SYSTOLIC BLOOD PRESSURE: 145 MMHG | BODY MASS INDEX: 26.55 KG/M2 | DIASTOLIC BLOOD PRESSURE: 81 MMHG | WEIGHT: 196 LBS | HEIGHT: 72 IN

## 2020-01-13 DIAGNOSIS — R07.9 CHEST PAIN, UNSPECIFIED TYPE: ICD-10-CM

## 2020-01-13 DIAGNOSIS — I25.110 CORONARY ARTERY DISEASE INVOLVING NATIVE CORONARY ARTERY OF NATIVE HEART WITH UNSTABLE ANGINA PECTORIS (HCC): ICD-10-CM

## 2020-01-13 LAB
STRESS BASELINE DIAS BP: 81 MMHG
STRESS BASELINE HR: 67 BPM
STRESS BASELINE SYS BP: 145 MMHG
STRESS ESTIMATED WORKLOAD: 1 METS
STRESS EXERCISE DUR MIN: NORMAL
STRESS PEAK DIAS BP: 81 MMHG
STRESS PEAK SYS BP: 145 MMHG
STRESS PERCENT HR ACHIEVED: 64 %
STRESS POST PEAK HR: 96 BPM
STRESS RATE PRESSURE PRODUCT: NORMAL BPM*MMHG
STRESS ST DEPRESSION: 0 MM
STRESS ST ELEVATION: 0 MM
STRESS TARGET HR: 149 BPM

## 2020-01-13 PROCEDURE — 74011250636 HC RX REV CODE- 250/636: Performed by: INTERNAL MEDICINE

## 2020-01-13 PROCEDURE — 93017 CV STRESS TEST TRACING ONLY: CPT

## 2020-01-13 RX ORDER — SODIUM CHLORIDE 9 MG/ML
250 INJECTION, SOLUTION INTRAVENOUS ONCE
Status: COMPLETED | OUTPATIENT
Start: 2020-01-13 | End: 2020-01-13

## 2020-01-13 RX ADMIN — SODIUM CHLORIDE 250 ML: 900 INJECTION, SOLUTION INTRAVENOUS at 11:10

## 2020-01-13 RX ADMIN — REGADENOSON 0.4 MG: 0.08 INJECTION, SOLUTION INTRAVENOUS at 11:10

## 2020-01-13 NOTE — PROGRESS NOTES
Per your last note\"    Mr. Ross Carrasquillo has history of CAD. He had exertional chest pains noted back in the summer of 2016. Lily Lopez had echocardiogram and nuclear stress study at [facility] and was told that he had no significant CAD. Two weeks later, with accelerating exertional angina, he was seen by cardiology.  At that time, with his classic symptoms, he was scheduled for coronary angiography, which revealed significant, long LAD as well as diagonal disease. He underwent complex LAD diagonal stent procedure with resolution of his symptoms. He also has history of hematuria. He was recently diagnosed with bladder tumor; surgical biopsy is planned. There is concern for possible malignancy.  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  He also only has one kidney with normal kidney function.  His nuclear scan in March 2018 was normal.  His nuclear scan back in January 2019 showed low risk findings with normal perfusion and EF of 60%. His echocardiogram in March 2019 showed normal left atrial chamber size and function without significant valvular disease.     · CAD:    Symptomatically stable. · BP:   Well controlled. · HR:    Stable. · CHF:    There is no evidence of decompensated CHF noted. · Weight:    His weight today is 196 pounds. He has lost about 5 pounds since last December. However, his baseline weight is 185 pounds. I did encourage him to try to get down to 185 pounds. · Cholesterol:   Target LDL <70. Remains on Lipitor 40. · Anti-platelet:   Remains on ASA, and Plavix.

## 2020-01-13 NOTE — PROGRESS NOTES
Patient was given 10.9 mCi of Sestamibi for the Resting pictures. Patient received 0.4 mg of Lexiscan for the exercise portion of the Stress test. Patient was then given 33 mCi of Sestamibi for the Stress pictures. Armband was removed and disposed of before the patient left.

## 2020-03-11 RX ORDER — ATORVASTATIN CALCIUM 40 MG/1
TABLET, FILM COATED ORAL
Qty: 90 TAB | Refills: 0 | OUTPATIENT
Start: 2020-03-11

## 2020-08-03 ENCOUNTER — HOSPITAL ENCOUNTER (OUTPATIENT)
Age: 70
Setting detail: OBSERVATION
LOS: 1 days | Discharge: HOME | End: 2020-08-04
Attending: INTERNAL MEDICINE | Admitting: INTERNAL MEDICINE
Payer: MEDICARE

## 2020-08-03 DIAGNOSIS — R00.1 SYMPTOMATIC BRADYCARDIA: ICD-10-CM

## 2020-08-03 PROBLEM — I20.0 UNSTABLE ANGINA (HCC): Status: ACTIVE | Noted: 2020-08-03

## 2020-08-03 PROBLEM — I25.10 CORONARY ARTERY DISEASE INVOLVING NATIVE CORONARY ARTERY: Status: ACTIVE | Noted: 2020-08-03

## 2020-08-03 PROBLEM — R06.02 SHORTNESS OF BREATH AT REST: Status: ACTIVE | Noted: 2020-08-03

## 2020-08-03 LAB
ANION GAP SERPL CALC-SCNC: 6 MMOL/L (ref 3–18)
ATRIAL RATE: 87 BPM
BASOPHILS # BLD: 0 K/UL (ref 0–0.1)
BASOPHILS NFR BLD: 0 % (ref 0–2)
BUN BLD-MCNC: 23 MG/DL (ref 7–18)
BUN SERPL-MCNC: 23 MG/DL (ref 7–18)
BUN/CREAT SERPL: 26 (ref 12–20)
CALCIUM SERPL-MCNC: 9.1 MG/DL (ref 8.5–10.1)
CALCULATED P AXIS, ECG09: 28 DEGREES
CALCULATED R AXIS, ECG10: 26 DEGREES
CALCULATED T AXIS, ECG11: 75 DEGREES
CHLORIDE BLD-SCNC: 103 MMOL/L (ref 100–108)
CHLORIDE SERPL-SCNC: 106 MMOL/L (ref 100–111)
CO2 SERPL-SCNC: 30 MMOL/L (ref 21–32)
CREAT SERPL-MCNC: 0.89 MG/DL (ref 0.6–1.3)
CREAT UR-MCNC: 0.9 MG/DL (ref 0.6–1.3)
DIAGNOSIS, 93000: NORMAL
DIFFERENTIAL METHOD BLD: ABNORMAL
EOSINOPHIL # BLD: 0.2 K/UL (ref 0–0.4)
EOSINOPHIL NFR BLD: 3 % (ref 0–5)
ERYTHROCYTE [DISTWIDTH] IN BLOOD BY AUTOMATED COUNT: 13 % (ref 11.6–14.5)
GLUCOSE BLD STRIP.AUTO-MCNC: 94 MG/DL (ref 74–106)
GLUCOSE SERPL-MCNC: 94 MG/DL (ref 74–99)
HCT VFR BLD AUTO: 45.8 % (ref 36–48)
HCT VFR BLD CALC: 46 % (ref 36–49)
HGB BLD-MCNC: 15.6 G/DL (ref 12–16)
HGB BLD-MCNC: 16.2 G/DL (ref 13–16)
INR BLD: 0.9 (ref 0.9–1.2)
INR PPP: 1 (ref 0.8–1.2)
LYMPHOCYTES # BLD: 2.3 K/UL (ref 0.9–3.6)
LYMPHOCYTES NFR BLD: 30 % (ref 21–52)
MCH RBC QN AUTO: 29.9 PG (ref 24–34)
MCHC RBC AUTO-ENTMCNC: 35.4 G/DL (ref 31–37)
MCV RBC AUTO: 84.7 FL (ref 74–97)
MONOCYTES # BLD: 0.5 K/UL (ref 0.05–1.2)
MONOCYTES NFR BLD: 6 % (ref 3–10)
NEUTS SEG # BLD: 4.7 K/UL (ref 1.8–8)
NEUTS SEG NFR BLD: 61 % (ref 40–73)
P-R INTERVAL, ECG05: 156 MS
PLATELET # BLD AUTO: 178 K/UL (ref 135–420)
PMV BLD AUTO: 9.5 FL (ref 9.2–11.8)
POTASSIUM BLD-SCNC: 3.8 MMOL/L (ref 3.5–5.5)
POTASSIUM SERPL-SCNC: 4.2 MMOL/L (ref 3.5–5.5)
PROTHROMBIN TIME: 12.8 SEC (ref 11.5–15.2)
Q-T INTERVAL, ECG07: 376 MS
QRS DURATION, ECG06: 86 MS
QTC CALCULATION (BEZET), ECG08: 452 MS
RBC # BLD AUTO: 5.41 M/UL (ref 4.7–5.5)
SODIUM BLD-SCNC: 140 MMOL/L (ref 136–145)
SODIUM SERPL-SCNC: 142 MMOL/L (ref 136–145)
VENTRICULAR RATE, ECG03: 87 BPM
WBC # BLD AUTO: 7.8 K/UL (ref 4.6–13.2)

## 2020-08-03 PROCEDURE — 93458 L HRT ARTERY/VENTRICLE ANGIO: CPT | Performed by: INTERNAL MEDICINE

## 2020-08-03 PROCEDURE — 85610 PROTHROMBIN TIME: CPT

## 2020-08-03 PROCEDURE — 33285 INSJ SUBQ CAR RHYTHM MNTR: CPT | Performed by: INTERNAL MEDICINE

## 2020-08-03 PROCEDURE — 74011250636 HC RX REV CODE- 250/636: Performed by: INTERNAL MEDICINE

## 2020-08-03 PROCEDURE — 74011636320 HC RX REV CODE- 636/320: Performed by: INTERNAL MEDICINE

## 2020-08-03 PROCEDURE — C1764 EVENT RECORDER, CARDIAC: HCPCS | Performed by: INTERNAL MEDICINE

## 2020-08-03 PROCEDURE — 93005 ELECTROCARDIOGRAM TRACING: CPT

## 2020-08-03 PROCEDURE — C1894 INTRO/SHEATH, NON-LASER: HCPCS | Performed by: INTERNAL MEDICINE

## 2020-08-03 PROCEDURE — 82565 ASSAY OF CREATININE: CPT

## 2020-08-03 PROCEDURE — 80048 BASIC METABOLIC PNL TOTAL CA: CPT

## 2020-08-03 PROCEDURE — 77030019905 HC CATH URETH INTMIT MDII -A

## 2020-08-03 PROCEDURE — 74011250637 HC RX REV CODE- 250/637: Performed by: INTERNAL MEDICINE

## 2020-08-03 PROCEDURE — 74011000250 HC RX REV CODE- 250: Performed by: INTERNAL MEDICINE

## 2020-08-03 PROCEDURE — 82947 ASSAY GLUCOSE BLOOD QUANT: CPT

## 2020-08-03 PROCEDURE — 77030013797 HC KT TRNSDUC PRSSR EDWD -A: Performed by: INTERNAL MEDICINE

## 2020-08-03 PROCEDURE — 99218 HC RM OBSERVATION: CPT

## 2020-08-03 PROCEDURE — 85025 COMPLETE CBC W/AUTO DIFF WBC: CPT

## 2020-08-03 PROCEDURE — 99152 MOD SED SAME PHYS/QHP 5/>YRS: CPT

## 2020-08-03 PROCEDURE — 77030015766: Performed by: INTERNAL MEDICINE

## 2020-08-03 DEVICE — MON LNQ11 REVEAL LINQ USA FW2.0
Type: IMPLANTABLE DEVICE | Status: FUNCTIONAL
Brand: REVEAL LINQ™

## 2020-08-03 RX ORDER — HEPARIN SODIUM 1000 [USP'U]/ML
INJECTION, SOLUTION INTRAVENOUS; SUBCUTANEOUS AS NEEDED
Status: DISCONTINUED | OUTPATIENT
Start: 2020-08-03 | End: 2020-08-03 | Stop reason: HOSPADM

## 2020-08-03 RX ORDER — SODIUM CHLORIDE 0.9 % (FLUSH) 0.9 %
5-40 SYRINGE (ML) INJECTION EVERY 8 HOURS
Status: DISCONTINUED | OUTPATIENT
Start: 2020-08-03 | End: 2020-08-04 | Stop reason: HOSPADM

## 2020-08-03 RX ORDER — LIDOCAINE HYDROCHLORIDE 10 MG/ML
INJECTION, SOLUTION EPIDURAL; INFILTRATION; INTRACAUDAL; PERINEURAL AS NEEDED
Status: DISCONTINUED | OUTPATIENT
Start: 2020-08-03 | End: 2020-08-03 | Stop reason: HOSPADM

## 2020-08-03 RX ORDER — POLYETHYLENE GLYCOL 3350 17 G/17G
17 POWDER, FOR SOLUTION ORAL DAILY PRN
Status: DISCONTINUED | OUTPATIENT
Start: 2020-08-03 | End: 2020-08-04 | Stop reason: HOSPADM

## 2020-08-03 RX ORDER — CEFAZOLIN SODIUM 2 G/50ML
SOLUTION INTRAVENOUS
Status: DISPENSED
Start: 2020-08-03 | End: 2020-08-04

## 2020-08-03 RX ORDER — MIDAZOLAM HYDROCHLORIDE 1 MG/ML
INJECTION, SOLUTION INTRAMUSCULAR; INTRAVENOUS AS NEEDED
Status: DISCONTINUED | OUTPATIENT
Start: 2020-08-03 | End: 2020-08-03 | Stop reason: HOSPADM

## 2020-08-03 RX ORDER — VERAPAMIL HYDROCHLORIDE 2.5 MG/ML
INJECTION, SOLUTION INTRAVENOUS AS NEEDED
Status: DISCONTINUED | OUTPATIENT
Start: 2020-08-03 | End: 2020-08-03 | Stop reason: HOSPADM

## 2020-08-03 RX ORDER — GUAIFENESIN 100 MG/5ML
81 LIQUID (ML) ORAL ONCE
Status: COMPLETED | OUTPATIENT
Start: 2020-08-03 | End: 2020-08-03

## 2020-08-03 RX ORDER — FENTANYL CITRATE 50 UG/ML
INJECTION, SOLUTION INTRAMUSCULAR; INTRAVENOUS AS NEEDED
Status: DISCONTINUED | OUTPATIENT
Start: 2020-08-03 | End: 2020-08-03 | Stop reason: HOSPADM

## 2020-08-03 RX ORDER — CEFAZOLIN SODIUM 2 G/50ML
2 SOLUTION INTRAVENOUS ONCE
Status: COMPLETED | OUTPATIENT
Start: 2020-08-03 | End: 2020-08-03

## 2020-08-03 RX ORDER — CLOPIDOGREL BISULFATE 75 MG/1
75 TABLET ORAL DAILY
Status: DISCONTINUED | OUTPATIENT
Start: 2020-08-03 | End: 2020-08-04 | Stop reason: HOSPADM

## 2020-08-03 RX ORDER — PROMETHAZINE HYDROCHLORIDE 25 MG/1
12.5 TABLET ORAL
Status: DISCONTINUED | OUTPATIENT
Start: 2020-08-03 | End: 2020-08-04 | Stop reason: HOSPADM

## 2020-08-03 RX ORDER — ATORVASTATIN CALCIUM 40 MG/1
40 TABLET, FILM COATED ORAL
Status: DISCONTINUED | OUTPATIENT
Start: 2020-08-03 | End: 2020-08-04 | Stop reason: HOSPADM

## 2020-08-03 RX ORDER — ACETAMINOPHEN 650 MG/1
650 SUPPOSITORY RECTAL
Status: DISCONTINUED | OUTPATIENT
Start: 2020-08-03 | End: 2020-08-04 | Stop reason: HOSPADM

## 2020-08-03 RX ORDER — ONDANSETRON 2 MG/ML
4 INJECTION INTRAMUSCULAR; INTRAVENOUS
Status: DISCONTINUED | OUTPATIENT
Start: 2020-08-03 | End: 2020-08-04 | Stop reason: HOSPADM

## 2020-08-03 RX ORDER — METOPROLOL SUCCINATE 50 MG/1
25 TABLET, EXTENDED RELEASE ORAL DAILY
Status: DISCONTINUED | OUTPATIENT
Start: 2020-08-03 | End: 2020-08-03

## 2020-08-03 RX ORDER — SODIUM CHLORIDE 0.9 % (FLUSH) 0.9 %
5-40 SYRINGE (ML) INJECTION AS NEEDED
Status: DISCONTINUED | OUTPATIENT
Start: 2020-08-03 | End: 2020-08-04 | Stop reason: HOSPADM

## 2020-08-03 RX ORDER — FAMOTIDINE 20 MG/1
20 TABLET, FILM COATED ORAL 2 TIMES DAILY
Status: DISCONTINUED | OUTPATIENT
Start: 2020-08-03 | End: 2020-08-04 | Stop reason: HOSPADM

## 2020-08-03 RX ORDER — SODIUM CHLORIDE 0.9 % (FLUSH) 0.9 %
5-40 SYRINGE (ML) INJECTION EVERY 8 HOURS
Status: DISCONTINUED | OUTPATIENT
Start: 2020-08-03 | End: 2020-08-03

## 2020-08-03 RX ORDER — ACETAMINOPHEN 325 MG/1
650 TABLET ORAL
Status: DISCONTINUED | OUTPATIENT
Start: 2020-08-03 | End: 2020-08-04 | Stop reason: HOSPADM

## 2020-08-03 RX ORDER — GUAIFENESIN 100 MG/5ML
81 LIQUID (ML) ORAL DAILY
Status: DISCONTINUED | OUTPATIENT
Start: 2020-08-04 | End: 2020-08-04 | Stop reason: HOSPADM

## 2020-08-03 RX ADMIN — ASPIRIN 81 MG CHEWABLE TABLET 81 MG: 81 TABLET CHEWABLE at 11:45

## 2020-08-03 RX ADMIN — Medication 10 ML: at 21:43

## 2020-08-03 RX ADMIN — CEFAZOLIN 2 G: 10 INJECTION, POWDER, FOR SOLUTION INTRAVENOUS at 13:03

## 2020-08-03 RX ADMIN — ATORVASTATIN CALCIUM 40 MG: 40 TABLET, FILM COATED ORAL at 21:43

## 2020-08-03 RX ADMIN — CLOPIDOGREL BISULFATE 75 MG: 75 TABLET ORAL at 11:45

## 2020-08-03 NOTE — ROUTINE PROCESS
Bedside and Verbal shift change report given to DENIS Herrera (oncoming nurse) by Kelly Jordan (offgoing nurse). Report included the following information SBAR, Kardex, Intake/Output and MAR.

## 2020-08-03 NOTE — ROUTINE PROCESS
TRANSFER - IN REPORT: 
 
Verbal report received from DENIS Owens(name) on Linda Quinnrs  being received from [employer]) for routine progression of care Report consisted of patients Situation, Background, Assessment and  
Recommendations(SBAR). Information from the following report(s) SBAR, Kardex, Intake/Output, MAR, Recent Results and Cardiac Rhythm NSR was reviewed with the receiving nurse. Opportunity for questions and clarification was provided. Assessment completed upon patients arrival to unit and care assumed.

## 2020-08-03 NOTE — Clinical Note
TRANSFER - IN REPORT:     Verbal report received from: [address] [place] [address]. Report consisted of patient's Situation, Background, Assessment and   Recommendations(SBAR). Opportunity for questions and clarification was provided. Assessment completed upon patient's arrival to unit and care assumed.

## 2020-08-03 NOTE — Clinical Note
TRANSFER - OUT REPORT:     Verbal report given to: Zaki Chavez RN. Report consisted of patient's Situation, Background, Assessment and   Recommendations(SBAR). Opportunity for questions and clarification was provided. Patient transported with a Cardiac Cath Tech / Patient Care Tech. Patient transported to: [address]

## 2020-08-03 NOTE — CONSULTS
[facility] - Consult Note    Consultation request by Melissa Sal MD for advice/opinion related to evaluating Symptomatic bradycardia [R00.1]  Shortness of breath at rest [R06.02]  Unstable angina ([facility] [address] [I20.0]    Date of  Admission: 8/3/2020 10:54 AM   Primary Care Physician:  Blythe Fothergill, MD     Assessment:     Patient Active Problem List   Diagnosis Code    Coronary artery disease involving native coronary artery of native heart with unstable angina pectoris ([facility] [address] I25.110    Hematuria R31.9    S/P coronary artery stent placement with drug eluding stents in the LAD and diagonal Z95.5    Chest pain R07.9    Dyslipidemia E78.5    S/P cystoscopy Z98.890    Unstable angina (HCC) I20.0    Shortness of breath at rest R06.02     -Acute shortness of breath with concern for ACS. Patient stable currently and without symptoms. Will proceed with cath today.  -History of CAD with cath 9/16/2016 and long ERIC to LAD. Remains on Plavix. L dom. LM patent. pLAD 70% FFR 0.74 (2.75 x 23-mm Xience ERIC). mD 95% (2.25 x 12-mm Resolute ERIC). Cx patent. RCA patent. LVEDP 4.  EF 60%. Mild anterior RWMA. RA 7.  PA 20/5. RV 19/1. W 9.  CO/CI:  6.2/3.2 (TD); 6.5/3.4 (Hugh). -History of hematuria and self-caths  -History of solitary kidney    -Primary cardiologist Dr. Herman Gunter:     Independently seen and evaluated. Agree with below. Patient with known history of CAD. He now presents with diaphoretic episodes associated with possible bradycardia. Patient is transfer from [facility] to here for heart catheterization, coronary angiography, possible revascularization. If his coronary status is unchanged, he may need consideration for permanent pacemaker in light of his heart rate dropping into 30's while being monitored in the hospital.    -Stable   -Will proceed with cath today with results pending.  -Patient with history of sinus bradycardia while at [facility].  Will cath first and monitor with consideration for possible PPM   -Continue with ASA, statin, Plavix, and BB  -More recommendations pending clinical course, test results.  -Will follow. History of Present Illness: This is a 68 y.o. male admitted for Symptomatic bradycardia [R00.1]  Shortness of breath at rest [R06.02]  Unstable angina ([address] [I20.0]. Patient complains of:  Patient initially seen at [facility] after he was experiencing significant shortness of breath over the course of an hour like he had never experienced. He received one sl NTG with almost complete relief of his symptoms and has not had any since. He denies any changes to meds, fevers, nausea, vomiting, sweating or chest pain. Cardiac risk factors: dyslipidemia, male gender, hypertension, CAD      Review of Symptoms:  Except as stated above include:  Constitutional:  negative  Respiratory:  negative  Cardiovascular:  negative  Gastrointestinal: negative  Genitourinary:  negative  Musculoskeletal:  Negative  Neurological:  Negative  Dermatological:  Negative  Endocrinological: Negative  Psychological:  Negative    Pertinent items are noted in HPI. Past Medical History:     Past Medical History:   Diagnosis Date    Cardiac cath 09/16/2016    L dom. LM patent. pLAD 70% FFR 0.74 (2.75 x 23-mm Xience ERIC). mD 95% (2.25 x 12-mm Resolute ERIC). Cx patent. RCA patent. LVEDP 4.  EF 60%. Mild anterior RWMA. RA 7.  PA 20/5. RV 19/1. W 9.  CO/CI:  6.2/3.2 (TD); 6.5/3.4 (Hugh).       Chest pain     Coronary artery disease     2 stents placed 9/16/2016    Hematuria     Hypertension     Solitary kidney          Social History:     Social History     Socioeconomic History    Marital status:      Spouse name: Not on file    Number of children: Not on file    Years of education: Not on file    Highest education level: Not on file   Tobacco Use    Smoking status: Never Smoker    Smokeless tobacco: Never Used   Substance and Sexual Activity    Alcohol use: No    Drug use: No        Family History:   No family history on file. Medications:   No Known Allergies     Current Facility-Administered Medications   Medication Dose Route Frequency    aspirin chewable tablet 81 mg  81 mg Oral ONCE    [START ON 8/4/2020] aspirin chewable tablet 81 mg  81 mg Oral DAILY    clopidogreL (PLAVIX) tablet 75 mg  75 mg Oral DAILY    metoprolol succinate (TOPROL-XL) XL tablet 25 mg  25 mg Oral DAILY    atorvastatin (LIPITOR) tablet 40 mg  40 mg Oral QHS         Physical Exam:     Visit Vitals  /83   Pulse 84   Temp 98.3 °F (36.8 °C)   Ht 5' 7\" (1.702 m)   Wt 89.4 kg (197 lb)   SpO2 97%   BMI 30.85 kg/m²     BP Readings from Last 3 Encounters:   08/03/20 170/83   01/13/20 145/81   12/03/19 130/88     Pulse Readings from Last 3 Encounters:   08/03/20 84   12/03/19 68   03/18/19 71     Wt Readings from Last 3 Encounters:   08/03/20 89.4 kg (197 lb)   01/13/20 88.9 kg (196 lb)   12/03/19 88.9 kg (196 lb)       General:  alert, cooperative, no distress, appears stated age  Neck:  nontender, no JVD  Lungs:  clear to auscultation bilaterally  Heart:  regular rate and rhythm, S1, S2 normal, no murmur, click, rub or gallop  Abdomen:  abdomen is soft without significant tenderness, masses, organomegaly or guarding  Extremities:  extremities normal, atraumatic, no cyanosis or edema  Skin: Warm and dry. no hyperpigmentation, vitiligo, or suspicious lesions  Neuro: alert, oriented x3, affect appropriate, no focal neurological deficits, moves all extremities well, no involuntary movements  Psych: non focal     Data Review:     Recent Labs     08/03/20  1116   WBC 7.8   HGB 16.2*   HCT 45.8        Recent Labs     08/03/20  1127 08/03/20  1116   INR 0.9 1.0       Results for orders placed or performed in visit on 03/18/19   AMB POC EKG ROUTINE W/ 12 LEADS, INTER & REP    Narrative    Read by Yahir De Leon MD - Sinus rhythm. Within normal limits. Results for orders placed or performed during the hospital encounter of 05/05/17   EKG, 12 LEAD, INITIAL   Result Value Ref Range    Ventricular Rate 81 BPM    Atrial Rate 81 BPM    P-R Interval 148 ms    QRS Duration 90 ms    Q-T Interval 372 ms    QTC Calculation (Bezet) 432 ms    Calculated P Axis 43 degrees    Calculated R Axis 36 degrees    Calculated T Axis 69 degrees    Diagnosis       Normal sinus rhythm  Normal ECG  When compared with ECG of 10-OCT-2016 09:48,  No significant change was found  Confirmed by Cornelio Hayden ([id number]) on 5/8/2017 9:15:04 AM         All Cardiac Markers in the last 24 hours:  No results found for: CPK, CK, CKMMB, CKMB, RCK3, CKMBT, CKNDX, CKND1, RAFAELA, TROPT, TROIQ, AYAKA, TROPT, TNIPOC, BNP, BNPP    Last Lipid:    Lab Results   Component Value Date/Time    Cholesterol, total 143 09/19/2016 05:09 AM    HDL Cholesterol 38 (L) 09/19/2016 05:09 AM    LDL, calculated 85 09/19/2016 05:09 AM    Triglyceride 100 09/19/2016 05:09 AM    CHOL/HDL Ratio 3.8 09/19/2016 05:09 AM       Signed By: BETY Garner     August 3, 2020

## 2020-08-03 NOTE — PROGRESS NOTES
10:22 AM  Cath holding summary     Patient transported to unit by medical transport and stretcher, alert and oriented x 4, voicing no complaints. Changed into gown and placed on monitor. NPO since MN. Lab results, med rec and H&P reviewed on chart. PIV x 2 inserted without difficulty. 11:47 AM    TRANSFER - OUT REPORT:    Verbal report given to Paula Zuniga (name) on [facility]  being transferred to Anton Ruano RN (unit) for ordered procedure       Report consisted of patients Situation, Background, Assessment and   Recommendations(SBAR). Information from the following report(s) SBAR, Procedure Summary, Intake/Output, MAR and Recent Results was reviewed with the receiving nurse. Lines:   Peripheral IV 08/03/20 Anterior;Proximal;Right Forearm (Active)   Site Assessment Clean, dry, & intact 08/03/20 1128   Phlebitis Assessment 0 08/03/20 1128   Infiltration Assessment 0 08/03/20 1128   Dressing Status Clean, dry, & intact 08/03/20 1128   Dressing Type Transparent 08/03/20 1128   Hub Color/Line Status Pink;Patent; Flushed;Capped 08/03/20 1128   Alcohol Cap Used Yes 08/03/20 1128       Peripheral IV 08/03/20 Distal;Left Antecubital (Active)   Site Assessment Clean, dry, & intact 08/03/20 1128   Phlebitis Assessment 0 08/03/20 1128   Infiltration Assessment 0 08/03/20 1128   Dressing Status Clean, dry, & intact 08/03/20 1128   Dressing Type Transparent 08/03/20 1128   Hub Color/Line Status Blue;Patent; Flushed;Capped 08/03/20 1128   Alcohol Cap Used Yes 08/03/20 1128        Opportunity for questions and clarification was provided. Patient transported with:   Tech    1220:    TRANSFER - IN REPORT:    Verbal report received from Paula Zuniga (name) on [facility]  being received from Sarah Keene (unit) for routine post - op      Report consisted of patients Situation, Background, Assessment and   Recommendations(SBAR).      Information from the following report(s) SBAR, Procedure Summary, Intake/Output, MAR, Recent Results, Procedure Verification, Quality Measures and Dual Neuro Assessment was reviewed with the receiving nurse. Opportunity for questions and clarification was provided. Assessment completed upon patients arrival to unit and care assumed. 1345:    DSTAT removed per policy without complication. No bleeding or hematoma noted at site. Hemostatic dressing and wrist splint applied. 1555:    TRANSFER - OUT REPORT:    Verbal report given to [employer], RN (name) on Tracy Mcdonald  being transferred to ([facility]) for routine progression of care       Report consisted of patients Situation, Background, Assessment and   Recommendations(SBAR). Information from the following report(s) SBAR, Procedure Summary, Intake/Output, MAR, Recent Results and Alarm Parameters  was reviewed with the receiving nurse. Lines:   Peripheral IV 08/03/20 Anterior;Proximal;Right Forearm (Active)   Site Assessment Clean, dry, & intact 08/03/20 1128   Phlebitis Assessment 0 08/03/20 1128   Infiltration Assessment 0 08/03/20 1128   Dressing Status Clean, dry, & intact 08/03/20 1128   Dressing Type Transparent 08/03/20 1128   Hub Color/Line Status Pink;Patent; Flushed;Capped 08/03/20 1128   Alcohol Cap Used Yes 08/03/20 1128       Peripheral IV 08/03/20 Distal;Left Antecubital (Active)   Site Assessment Clean, dry, & intact 08/03/20 1128   Phlebitis Assessment 0 08/03/20 1128   Infiltration Assessment 0 08/03/20 1128   Dressing Status Clean, dry, & intact 08/03/20 1128   Dressing Type Transparent 08/03/20 1128   Hub Color/Line Status Blue;Patent; Flushed;Capped 08/03/20 1128   Alcohol Cap Used Yes 08/03/20 1128        Opportunity for questions and clarification was provided.       Patient transported with:   Registered Nurse

## 2020-08-03 NOTE — Clinical Note
TRANSFER - OUT REPORT:     [facility]. Report consisted of patient's Situation, Background, Assessment and   Recommendations(SBAR). Opportunity for questions and clarification was provided. Patient transported to: Room 7.

## 2020-08-03 NOTE — Clinical Note
TRANSFER - IN REPORT:     Verbal report received from: Courtney Hannah RN. Report consisted of patient's Situation, Background, Assessment and   Recommendations(SBAR). Opportunity for questions and clarification was provided. Assessment completed upon patient's arrival to unit and care assumed. Patient transported with a Cardiac Cath Tech / Patient Care Tech.

## 2020-08-03 NOTE — H&P
History & Physical    Patient: Brown Cornelius MRN: [id number]  CSN: [id number]    YOB: 1950  Age: 68 y.o. Sex: male      DOA: 8/3/2020  CC: shortness of breath    PCP: Bernadette Overton MD       HPI:     Brown Cornelius is a 68 y.o. male with medical co-morbidities including HTN, CAD with h/o stent, hyperlipidemia, obesity, solitary kidney, chronic leg pain on chronic NSAIDs, presented to [facility] with acute onset of dyspnea while at rest. He denied chest pain or chest palpitation. No associate leg welling. He has no recent long distant travel due to strict quarantine. No fever and no cough. He presented to the ER which further workup included a negative CTA chest, negative troponin, Echo with preserved EF. He was transferred her today for further cardiac catheterization and possible intervention. Of note, he was recent has a Tick bite on the upper part of his left chest. No skill change appreciated. He noted it 24 hours after working in the yard. Review of Systems  GENERAL: No fever, No chill, No malaise   HEENT: No change in vision, no ear ache, tinnitus, no sore throat or sinus congestion. NECK: No pain or stiffness. PULMONARY: No shortness of breath, no cough or wheeze. Cardiovascular: no pnd / orthopnea, no Chest Pain  GASTROINTESTINAL: No abd pain, No nausea/vomiting, No diarrhea, No melena or bright red blood per rectum. GENITOURINARY: No urinary frequency, No urgency or pain with urination. MUSCULOSKELETAL: No joint or muscle pain, no back pain, no recent trauma. DERMATOLOGIC: No rash, no itching, no lesions. ENDOCRINE: No polyuria, polydipsia,  No recent change in weight. HEMATOLOGICAL: No easy bruising or bleeding. NEUROLOGIC: No headache, No seizures, No generalized weakness         Past Medical History:   Diagnosis Date    Cardiac cath 09/16/2016    L dom. LM patent. pLAD 70% FFR 0.74 (2.75 x 23-mm Xience ERIC).   mD 95% (2.25 x 12-mm Resolute ERIC). Cx patent. RCA patent. LVEDP 4.  EF 60%. Mild anterior RWMA. RA 7.  PA 20/5. RV 19/1. W 9.  CO/CI:  6.2/3.2 (TD); 6.5/3.4 (Hugh).  Chest pain     Coronary artery disease     2 stents placed 9/16/2016    Hematuria     Hypertension     Solitary kidney        Past Surgical History:   Procedure Laterality Date    CARDIAC CATHETERIZATION  9/16/2016         CORONARY ARTERY ANGIOGRAM  9/16/2016         CORONARY STENT SINGLE W/PTCA  9/16/2016         FRACTIONAL FLOW RESERVE  9/16/2016         HX APPENDECTOMY  1958    HX MOHS PROCEDURES Bilateral 1993    HX NEPHRECTOMY Left 2001    HX TONSILLECTOMY  1951    LV ANGIOGRAPHY  9/16/2016         RT & LT HEART WITH C.O.  9/16/2016            History reviewed. No pertinent family history. Social History     Socioeconomic History    Marital status:      Spouse name: Not on file    Number of children: Not on file    Years of education: Not on file    Highest education level: Not on file   Tobacco Use    Smoking status: Never Smoker    Smokeless tobacco: Never Used   Substance and Sexual Activity    Alcohol use: No    Drug use: No       Prior to Admission medications    Medication Sig Start Date End Date Taking? Authorizing Provider   atorvastatin (LIPITOR) 40 mg tablet Take 1 tablet by mouth once daily 3/27/20  Yes Neha TRIANA NP   clopidogreL (PLAVIX) 75 mg tab Take 1 tablet by mouth once daily 3/3/20  Yes Douglas Santiago NP   metoprolol succinate (TOPROL-XL) 25 mg XL tablet TAKE 1 TABLET BY MOUTH ONCE DAILY 2/17/20  Yes Douglas Santiago NP   alfuzosin SR (UROXATRAL) 10 mg SR tablet TAKE 1 TABLET BY MOUTH ONCE DAILY AFTER SUPPER 7/15/19  Yes Asher Bee MD   aspirin 81 mg chewable tablet Take 81 mg by mouth daily. Yes Provider, Historical   glucosamine 1,000 mg tab Take 1,500 mg by mouth daily.    Yes Provider, Historical   loratadine (ALLERCLEAR) 10 mg tablet Take 10 mg by mouth daily. Yes Provider, Historical   docusate sodium (STOOL SOFTENER) 50 mg capsule Take 50 mg by mouth two (2) times a day. Yes Provider, Historical   multivitamin (ONE A DAY) tablet Take 1 Tab by mouth daily. Yes Provider, Historical   diclofenac EC (VOLTAREN) 75 mg EC tablet Take 75 mg by mouth two (2) times a day. Yes Provider, Historical   diclofenac (VOLTAREN) 1 % gel Apply 2 g to affected area two (2) times a day. Yes Provider, Historical       No Known Allergies           Physical Exam:      Visit Vitals  /83   Pulse 84   Temp 98.3 °F (36.8 °C)   Ht 5' 7\" (1.702 m)   Wt 89.4 kg (197 lb)   SpO2 100%   BMI 30.85 kg/m²       Physical Exam:  Tele: sinus  General:  Cooperative, Not in acute distress, speaks in full sentence while in bed  HEENT: PERRL, EOMI, supple neck, no JVD, dry oral mucosa  Cardiovascular: S1S2 regular, no rub/gallop   Pulmonary: Clear air entry bilaterally, no wheezing, no crackle  GI:  Soft, non tender, non distended, +bs, no guarding   Extremities:  No pedal edema, +distal pulses appreciated   Neuro: AOx3, moving all extremities, no gross deficit. Lab/Data Review:  Labs: Results:       Chemistry Recent Labs     08/03/20  1116   GLU 94      K 4.2      CO2 30   BUN 23*   CREA 0.89   CA 9.1   AGAP 6   BUCR 26*      CBC w/Diff Recent Labs     08/03/20  1116   WBC 7.8   RBC 5.41   HGB 16.2*   HCT 45.8      GRANS 61   LYMPH 30   EOS 3      Coagulation Recent Labs     08/03/20  1127 08/03/20  1116   PTP  --  12.8   INR 0.9 1.0       Iron/Ferritin No results for input(s): IRON in the last 72 hours. No lab exists for component: TIBCCALC   BNP No results for input(s): BNPP in the last 72 hours. Cardiac Enzymes No results for input(s): CPK, CKND1, RAFAELA in the last 72 hours. No lab exists for component: CKRMB, TROIP   Liver Enzymes No results for input(s): TP, ALB, TBIL, AP in the last 72 hours.     No lab exists for component: SGOT, GPT, DBIL   Thyroid Studies No results found for: T4, T3U, TSH, TSHEXT, TSHEXT       All Micro Results     None          Imaging Reviewed:  CTA chest:  CT CTA CHEST PULMONARY (08/01/2020 5:35 AM EDT)  CT CTA CHEST PULMONARY (08/01/2020 5:35 AM EDT)   Specimen         CT CTA CHEST PULMONARY (08/01/2020 5:35 AM EDT)   Impressions Performed At   Mild bibasilar atelectasis. No convincing evidence of a lobar pneumonia or pleural fluid or pneumothorax or pulmonary embolism.        Cardiomegaly with coronary artery calcifications. Scoliosis of the thoracic spine incidentally noted. Atherosclerosis of the thoracic aorta without aneurysm or dissection.        Liver lesions suspected incompletely characterized recommend MRI with dynamic gadolinium enhancement if further characterization is required clinically.        Mild eventration of the left hemidiaphragm.        Signed By: Maureen Graves MD on 8/1/2020 5:56 AM       RADIOLOGY       CT CTA CHEST PULMONARY (08/01/2020 5:35 AM EDT)   Narrative Performed At   Indication: Shortness of breath .        Findings: Contiguous axial, thinly collimated, images of the chest were obtained after the administration of intravenous contrast utilizing a pulmonary embolism protocol.         CT angiographic images of the pulmonary arteries were then obtained and reconstructed into the coronal plane.        Thin helical axial images were obtained through the chest during bolus administration of IV iodinated contrast. Multiplanar, maximum intensity projection and volume rendered images were created from the data set.             No thoracic aortic aneurysm or dissection. Gallstones within the gallbladder noted. Subtle low density within the right lobe of the liver. A liver mass cannot be excluded. This measures up to 33 mm in diameter and is incompletely characterized on this chest CT. No filling defects within the pulmonary arteries to suggest pulmonary embolism.  No pneumothorax or pneumomediastinum.       RADIOLOGY         Echo:  Impressions Performed At   :    NORMAL GLOBAL LEFT VENTRICULAR SYSTOLIC FUNCTION WITH AN ESTIMATED EJECTION FRACTION OF    55% TO 60%.    STAGE I DIASTOLIC DYSFUNCTION.    MILDLY INCREASED LEFT VENTRICULAR WALL THICKNESS.    NORMAL RIGHT VENTRICULAR GLOBAL SYSTOLIC FUNCTION.    MITRAL ANNULAR CALCIFICATION WITH TRIVIAL MITRAL REGURGITATION.    MILD AORTIC REGURGITATION.    TRIVIAL TRICUSPID REGURGITATION.    THE ESTIMATED RIGHT VENTRICULAR SYSTOLIC PRESSURE IS 24 MMHG.        NO PREVIOUS STUDY FOR COMPARISON.         Clinical Indications: shortness of breath    ICD Codes: Technical Quality: Technically difficult study due    to poor acoustical access,    foreshortened apical images        MEASUREMENTS:  (Male / Female) Normal Values    2D ECHO     LV Diastolic Diameter Base LX     4 cm                  4.2-5.8 / 5.4-8.9    LV Systolic Diameter Base LX      2.9 cm                2.5-4.0 / 9.7-5.4    IVS Diastolic Thickness           1.2 cm                0.6-1.0 / 1.3-4.8    LVPW Diastolic Thickness          1.2 cm                0.6-1.0 / 8.5-1.1    LA Systolic Diameter LX           3.3 cm                2.1-3.7 cm    Aortic Root Diameter              3.5 cm                4.4-2.0 cm    LA Systolic Pressure              13.9 mmHg    MV Area PHT                       2.9 cm²            DOPPLER     AV Peak Velocity                  133 cm/s    AV Peak Gradient                  7.1 mmHg    AV Mean Gradient                  5 mmHg    AV Velocity Time Integral         29.1 cm    LVOT Peak Velocity                117 cm/s    LVOT Peak Gradient                5.5 mmHg    LVOT Velocity Time Integral       26 cm    Mitral E Point Velocity           70 cm/s    Mitral  A Point Velocity          123 cm/s    Mitral E to A Ratio               0.57                  1.0 to 1.5    Mitral E to LV E' Septal Ratio    14.6    Mitral E to LV E' Lateral Ratio   9.6    MV Deceleration Time              259 ms                160-240 ms    MV Area PHT                       2.9 cm²    Pulm Vein Atrial Reversal Veloci  28 cm/s               <35 cm/s    Pulm Vein Atrial Duration         127 ms    E Prime Velocity                  7.3 cm/s    TR Peak Velocity                  2.1 m/s    TR Peak Gradient                  18.5 mmHg            FINDINGS:        Left Ventricle: Normal left ventricular cavity size. Mildly increased left ventricular wall thickness.    Left Ventricle Normal global left ventricular systolic function. Stage I diastolic dysfunction.    Function:    LVEF: 58 %            Left Atrium: Normal left atrial size. Unable to accurately measure/calculate left atrial volume index.    Right Ventricle: Normal right ventricular size. Normal right ventricular global systolic function. Tricuspid Annular    Plane Systolic Excursion (TAPSE) is 2.3 cm. Tricuspid Annular Plane Systolic Velocity (TAPSV) is    13 cm/s. Moderator band noted.    Right Atrium: Normal right atrial size. Normal inferior vena cava size with adequate inspiratory collapse.    Mitral Valve: Structurally normal mitral valve.  Mitral annular calcification with trivial mitral regurgitation. No    evidence of mitral stenosis.    Aortic Valve: Sclerotic and trileaflet aortic valve without reduced excursion. Mild aortic regurgitation. No    evidence of aortic stenosis.    Tricuspid Valve: Normally structured tricuspid valve. Trivial tricuspid regurgitation. The estimated right ventricular    systolic pressure is 24 mmHg.    Pulmonic Valve: The pulmonic valve was not well visualized.    Aorta: Normal aortic root diameter.    Pericardium: Normal pericardium.    Masses / Shunts: No obvious masses, shunts or thrombi seen.                                        Bhupendra Marti MD    (Electronically Signed)    Final Date: 02 August 2020 15:46                Assessment:   Active Problems:  1. Dyspnea at rest, Shortness of breath at rest   2. CAD with h/o stent   3. HTN   4. Hyperlipidemia   5. Obesity   6. Chronic NSAID use for chronic lower leg pain   7. Solitary kidney   8. Recent tick bite with <36hrs attachment   9. Liver lesion suspected on CTA, can follow up outpatient care. Plan:     Plan for Observation today. He will undergoes cardiac cath per Dr. Mikey Ball for now. Can resume his cardiac medications, (ASA, Plavix, Metoprolol, Atorvastatin)   Education provided for avoiding NSAIDs to prevent further renal disease or GI bleeding risk  Will not need doxycycline prophylaxis dosing as his tick bite was less than 36 hours.    Pepcid  ICS     Risk of deterioration:  [x]Low    []Moderate  []High     Prophylaxis:  []Lovenox  []Coumadin  []Hep SQ  [x]SCDs  [x]H2B/PPI     Disposition:  [x]Home w/ Family   []HH PT,OT,RN   []SNF/LTC   []SAH/Rehab     Discussed Code Status:         [x]Full Code      []DNR         ___________________________________________________     Care Plan discussed with:    [x]Patient   []Family    []ED Care Manager  []ED Doc   [x]Specialist :  Total Time Coordinating Admission:  60    minutes    []Total Critical Care Time:       Rui Escobedo MD  8/3/2020, 11:43 AM

## 2020-08-03 NOTE — Clinical Note
Right groin and right radial prepped with ChloraPrep Wet prep solution applied at: 1200. Wet prep solution dried at: 1203. Wet prep elapsed drying time: 3 mins.

## 2020-08-03 NOTE — ROUTINE PROCESS
Primary Nurse John Villagran RN and Elías Suarez, RN performed a dual skin assessment on this patient No impairment noted Chandana score is 23 Razor bumps and red rash noted on right groin and abdomen from being shaved in cath lab prior to cardiac cath.

## 2020-08-04 VITALS
BODY MASS INDEX: 30.92 KG/M2 | DIASTOLIC BLOOD PRESSURE: 76 MMHG | OXYGEN SATURATION: 95 % | HEART RATE: 73 BPM | RESPIRATION RATE: 18 BRPM | TEMPERATURE: 97.9 F | HEIGHT: 67 IN | SYSTOLIC BLOOD PRESSURE: 136 MMHG | WEIGHT: 197 LBS

## 2020-08-04 PROBLEM — R00.1 SYMPTOMATIC BRADYCARDIA: Status: ACTIVE | Noted: 2020-08-04

## 2020-08-04 LAB
ANION GAP SERPL CALC-SCNC: 4 MMOL/L (ref 3–18)
BUN SERPL-MCNC: 33 MG/DL (ref 7–18)
BUN/CREAT SERPL: 35 (ref 12–20)
CALCIUM SERPL-MCNC: 8.8 MG/DL (ref 8.5–10.1)
CHLORIDE SERPL-SCNC: 107 MMOL/L (ref 100–111)
CO2 SERPL-SCNC: 30 MMOL/L (ref 21–32)
CREAT SERPL-MCNC: 0.93 MG/DL (ref 0.6–1.3)
ERYTHROCYTE [DISTWIDTH] IN BLOOD BY AUTOMATED COUNT: 13.2 % (ref 11.6–14.5)
GLUCOSE SERPL-MCNC: 94 MG/DL (ref 74–99)
HCT VFR BLD AUTO: 43 % (ref 36–48)
HGB BLD-MCNC: 14.4 G/DL (ref 13–16)
MAGNESIUM SERPL-MCNC: 2 MG/DL (ref 1.6–2.6)
MCH RBC QN AUTO: 29 PG (ref 24–34)
MCHC RBC AUTO-ENTMCNC: 33.5 G/DL (ref 31–37)
MCV RBC AUTO: 86.5 FL (ref 74–97)
PLATELET # BLD AUTO: 182 K/UL (ref 135–420)
PMV BLD AUTO: 10.1 FL (ref 9.2–11.8)
POTASSIUM SERPL-SCNC: 4.3 MMOL/L (ref 3.5–5.5)
RBC # BLD AUTO: 4.97 M/UL (ref 4.7–5.5)
SODIUM SERPL-SCNC: 141 MMOL/L (ref 136–145)
TSH SERPL DL<=0.05 MIU/L-ACNC: 4.45 UIU/ML (ref 0.36–3.74)
WBC # BLD AUTO: 7.4 K/UL (ref 4.6–13.2)

## 2020-08-04 PROCEDURE — 99218 HC RM OBSERVATION: CPT

## 2020-08-04 PROCEDURE — 85027 COMPLETE CBC AUTOMATED: CPT

## 2020-08-04 PROCEDURE — 36415 COLL VENOUS BLD VENIPUNCTURE: CPT

## 2020-08-04 PROCEDURE — 74011250637 HC RX REV CODE- 250/637: Performed by: INTERNAL MEDICINE

## 2020-08-04 PROCEDURE — 83735 ASSAY OF MAGNESIUM: CPT

## 2020-08-04 PROCEDURE — 84443 ASSAY THYROID STIM HORMONE: CPT

## 2020-08-04 PROCEDURE — 80048 BASIC METABOLIC PNL TOTAL CA: CPT

## 2020-08-04 RX ADMIN — Medication 10 ML: at 09:27

## 2020-08-04 RX ADMIN — CLOPIDOGREL BISULFATE 75 MG: 75 TABLET ORAL at 09:22

## 2020-08-04 RX ADMIN — ASPIRIN 81 MG CHEWABLE TABLET 81 MG: 81 TABLET CHEWABLE at 09:22

## 2020-08-04 NOTE — DISCHARGE INSTRUCTIONS
Angina: Care Instructions  Your Care Instructions     You have a problem called angina. Angina happens when there is not enough blood flow to your heart muscle. Angina is a sign of coronary artery disease (CAD). CAD occurs when blood vessels that supply the heart become narrowed. Having CAD increases your risk of a heart attack. Chest pain or pressure is the most common symptom of angina. But some people have other symptoms, like:  · Pain, pressure, or a strange feeling in the back, neck, jaw, or upper belly, or in one or both shoulders or arms. · Shortness of breath. · Nausea or vomiting. · Lightheadedness or sudden weakness. · Fast or irregular heartbeat. Women are somewhat more likely than men to have angina symptoms like shortness of breath, nausea, and back or jaw pain. Angina can be dangerous. That's why it is important to pay attention to your symptoms. Know what is typical for you, learn how to control your symptoms, and understand when you need to get treatment. A change in your usual pattern of symptoms is an emergency. It may mean that you are having a heart attack. The doctor has checked you carefully, but problems can develop later. If you notice any problems or new symptoms, get medical treatment right away. Follow-up care is a key part of your treatment and safety. Be sure to make and go to all appointments, and call your doctor if you are having problems. It's also a good idea to know your test results and keep a list of the medicines you take. How can you care for yourself at home? Medicines  · If your doctor has given you nitroglycerin for angina symptoms, keep it with you at all times. If you have symptoms, sit down and rest, and take the first dose of nitroglycerin as directed. If your symptoms get worse or are not getting better within 5 minutes, call 911 right away. Stay on the phone. The emergency  will give you further instructions.   · If your doctor advises it, take 1 low-dose aspirin a day to prevent heart attack. · Be safe with medicines. Take your medicines exactly as prescribed. Call your doctor if you think you are having a problem with your medicine. You will get more details on the specific medicines your doctor prescribes. Lifestyle changes  · Do not smoke. If you need help quitting, talk to your doctor about stop-smoking programs and medicines. These can increase your chances of quitting for good. · Eat a heart-healthy diet that is low in saturated fat and salt, and is high in fiber. Talk to your doctor or a dietitian about healthy eating. · Stay at a healthy weight. Or lose weight if you need to. Activity  · Talk to your doctor about a level of activity that is safe for you. · If an activity causes angina symptoms, stop and rest.  When should you call for help? [id number] anytime you think you may need emergency care. For example, call if:  · You passed out (lost consciousness). · You have symptoms of a heart attack. These may include:  ? Chest pain or pressure, or a strange feeling in the chest.  ? Sweating. ? Shortness of breath. ? Nausea or vomiting. ? Pain, pressure, or a strange feeling in the back, neck, jaw, or upper belly or in one or both shoulders or arms. ? Lightheadedness or sudden weakness. ? A fast or irregular heartbeat. After you call 911, the  may tell you to chew 1 adult-strength or 2 to 4 low-dose aspirin. Wait for an ambulance. Do not try to drive yourself. · You have angina symptoms that do not go away with rest or are not getting better within 5 minutes after you take a dose of nitroglycerin. Call your doctor now if:  · Your angina symptoms seem worse but still follow your typical pattern. You can predict when symptoms will happen, but they may come on sooner, feel worse, or last longer. · You feel dizzy or lightheaded, or you feel like you may faint.   Watch closely for changes in your health, and be sure to contact your doctor if you have any problems. Where can you learn more? Go to http://barbara-alexis.info/  Enter H129 in the search box to learn more about \"Angina: Care Instructions. \"  Current as of: December 16, 2019               Content Version: 12.5  © 0535-0202 [employer]. Care instructions adapted under license by [employer] (which disclaims liability or warranty for this information). If you have questions about a medical condition or this instruction, always ask your healthcare professional. [address] any warranty or liability for your use of this information. Learning About a Pacemaker  What is a pacemaker? A pacemaker is a small device. It sends out mild electrical signals that keep your heart beating normally. The signals are painless. It can help stop the dizziness, fainting, and shortness of breath caused by a slow or unsteady heartbeat. A pacemaker is powered by batteries. Most pacemakers are placed under the skin of your chest. They have thin wires, called leads. The leads pass through a vein into your heart. A pacemaker can help restore a normal heart rate. It is used when certain problems have damaged the heart's electrical system, which normally keeps your heart beating steadily. You may feel worried about having a pacemaker. This is common. It can help if you learn about how the pacemaker helps your heart. Talk to your doctor about your concerns. How is a pacemaker implanted in your chest?  You will get medicine before the procedure. It helps you relax and helps prevent pain. The doctor makes a cut in the skin just below your collarbone. The cut may be on either side of your chest. The doctor will put the pacemaker leads through the cut. The leads go into a large blood vessel in the upper chest. Then the doctor will guide the leads through the blood vessel into the heart.  The leads are placed in one or two of the chambers in the heart. The doctor will place the pacemaker under the skin of your chest. He or she will attach the leads to the pacemaker. Then the cut will be closed with stitches. The procedure usually takes about an hour. You may need to spend the night in the hospital.  What can you expect when you have a pacemaker? A pacemaker can help you return to a more normal, more active life. You'll need to use certain electric devices with caution. Some devices have a strong electromagnetic field. This field can keep your pacemaker from working right for a short time. These devices include things in your home, garage, or workplace. Check with your doctor about what you need to avoid and what you need to keep a short distance away from your pacemaker. Many household and office electronics do not affect your pacemaker. Your doctor will check your pacemaker regularly to make sure it is working right. Pacemaker batteries usually last 5 to 15 years before they need to be replaced. Follow-up care is a key part of your treatment and safety. Be sure to make and go to all appointments, and call your doctor if you are having problems. It's also a good idea to know your test results and keep a list of the medicines you take. Where can you learn more? Go to http://barbara-alexis.info/  Enter Y686 in the search box to learn more about \"Learning About a Pacemaker. \"  Current as of: December 16, 2019               Content Version: 12.5  © 3990-6731 [employer]. Care instructions adapted under license by [employer] (which disclaims liability or warranty for this information). If you have questions about a medical condition or this instruction, always ask your healthcare professional. [place] [address] any warranty or liability for your use of this information. Pacemaker Placement: Before Your Procedure  What is pacemaker placement?      A pacemaker is a small, battery-powered device. It sends electrical signals to the heart. This keeps the heartbeat steady when you have bradycardia (a slow heart rate). Thin wires, called leads, carry the signals between the pacemaker and the heart. This device is also called a pacer. You will get medicine before the procedure. This helps you relax and helps prevent pain. The doctor will make a cut in the skin just below your collarbone. The cut may be on either side of your chest. The doctor will put the pacemaker leads through the cut. The leads go into a large blood vessel in the upper chest. Then the doctor will guide the leads through the blood vessel into the heart. The doctor will place the pacemaker under the skin of your chest. He or she will attach the leads to the pacemaker. Then the cut will be closed with stitches. The procedure usually takes about an hour. You may need to spend the night in the hospital.  Pacemaker batteries usually last 5 to 15 years. Your doctor will talk to you about how often you will need to have your pacemaker and battery checked. You can likely return to many of your normal activities after your procedure. But to stay safe, you may need to make some changes in your normal routine. You may feel worried about having a pacemaker. This is common. You might feel better if you learn techniques to help you relax. And it can help if you learn about how the pacemaker helps your heart. Talk to your doctor about your questions and concerns. Follow-up care is a key part of your treatment and safety. Be sure to make and go to all appointments, and call your doctor if you are having problems. It's also a good idea to know your test results and keep a list of the medicines you take. How do you prepare for the procedure? Procedures can be stressful. This information will help you understand what you can expect. And it will help you safely prepare for your procedure.   Preparing for the procedure  · Be sure you have someone to take you home. Anesthesia and pain medicine will make it unsafe for you to drive or get home on your own. · Understand exactly what procedure is planned, along with the risks, benefits, and other options. · If you take aspirin or some other blood thinner, be sure to talk to your doctor. He or she will tell you if you should stop taking it before your procedure. Make sure that you understand exactly what your doctor wants you to do. · Tell your doctor ALL the medicines, vitamins, supplements, and herbal remedies you take. Some may increase the risk of problems during your procedure. Your doctor will tell you if you should stop taking any of them before the procedure and how soon to do it. · Make sure your doctor and the hospital have a copy of your advance directive. If you don't have one, you may want to prepare one. It lets others know your health care wishes. It's a good thing to have before any type of surgery or procedure. What happens on the day of the procedure? · Follow the instructions exactly about when to stop eating and drinking. If you don't, your procedure may be canceled. If your doctor told you to take your medicines on the day of the procedure, take them with only a sip of water. · Take a bath or shower before you come in for your procedure. Do not apply lotions, perfumes, deodorants, or nail polish. · Take off all jewelry and piercings. And take out contact lenses, if you wear them. At the hospital or surgery center    · Bring a picture ID. · You will be kept comfortable and safe by your anesthesia provider. You may get medicine that relaxes you or puts you in a light sleep. The area being worked on will be numb. · The procedure will take about an hour. When should you call your doctor? · You have questions or concerns. · You don't understand how to prepare for your procedure. · You become ill before the procedure (such as fever, flu, or a cold).   · You need to reschedule or have changed your mind about having the procedure. Where can you learn more? Go to http://barbara-alexis.info/  Enter T447 in the search box to learn more about \"Pacemaker Placement: Before Your Procedure. \"  Current as of: December 16, 2019               Content Version: 12.5  © 1580-5164 [employer]. Care instructions adapted under license by [employer] (which disclaims liability or warranty for this information). If you have questions about a medical condition or this instruction, always ask your healthcare professional. [place] [address] any warranty or liability for your use of this information. Pacemaker Placement: What to Expect at [address] [facility] placement is surgery to put a pacemaker in your chest. This surgery may be done if you have bradycardia (a slow heart rate). A pacemaker is a small, battery-powered device. It sends electrical signals to the heart. These signals work to keep the heartbeat steady. Thin wires, called leads, carry the signals from the pacemaker to the heart. A pacemaker can prevent or reduce dizziness, fainting, and shortness of breath caused by a slow or unsteady heartbeat. Your chest may be sore where the doctor made the cut (incision) and put in the pacemaker. You also may have a bruise and mild swelling. These symptoms usually get better in 1 to 2 weeks. You may feel a hard ridge along the incision. This usually gets softer in the months after surgery. You may be able to see or feel the outline of the pacemaker under your skin. You will probably be able to go back to work or your usual routine 1 to 2 weeks after surgery. Pacemaker batteries usually last 5 to 15 years. Your doctor will talk to you about how often you will need to have your pacemaker checked. You'll need to take steps to safely use electric devices.  Some of these devices can stop your pacemaker from working right for a short time. Check with your doctor about what to avoid and what to keep a short distance away from your pacemaker. For example, you will need to stay away from things with strong magnetic and electrical fields. An example is an MRI machine (unless your pacemaker is safe for an MRI). You can use a cell phone and other wireless devices but keep them at least 6 inches away from your pacemaker. Many household and office electronics do not affect a pacemaker. These include kitchen appliances and computers. This care sheet gives you a general idea about how long it will take for you to recover. But each person recovers at a different pace. Follow the steps below to get better as quickly as possible. How can you care for yourself at home? Activity  · Rest when you feel tired. · Be active. Walking is a good choice. · For 4 to 6 weeks:  ? Avoid activities that strain your chest or upper arm muscles. This includes pushing a  or vacuum, or mopping floors. It also includes swimming, or swinging a golf club or tennis racquet. ? Do not raise your arm (the one on the side of your body where the pacemaker is located) above your shoulder. ? Allow your body to heal. Don't move quickly or lift anything heavy until you are feeling better. · Many people are able to return to work within 1 to 2 weeks after surgery. · Ask your doctor when it is okay for you to have sex. Diet  · You can eat your normal diet. If your stomach is upset, try bland, low-fat foods like plain rice, broiled chicken, toast, and yogurt. Medicines  · Your doctor will tell you if and when you can restart your medicines. He or she will also give you instructions about taking any new medicines. · If you take aspirin or some other blood thinner, be sure to talk to your doctor. He or she will tell you if and when to start taking this medicine again. Make sure that you understand exactly what your doctor wants you to do.   · Be safe with medicines. Read and follow all instructions on the label. ? If the doctor gave you a prescription medicine for pain, take it as prescribed. ? If you are not taking a prescription pain medicine, ask your doctor if you can take an over-the-counter medicine. ? Do not take aspirin, ibuprofen (Advil, Motrin), naproxen (Aleve), or other nonsteroidal anti-inflammatory drugs (NSAIDs) unless your doctor says it is okay. · If your doctor prescribed antibiotics, take them as directed. Do not stop taking them just because you feel better. You need to take the full course of antibiotics. Incision care  · If you have strips of tape on the incision, leave the tape on for a week or until it falls off. · Keep the incision dry while it heals. Your doctor may recommend sponge baths for about 7 days, but do not get the incision wet. Your doctor will let you know when you may take showers. After a shower, pat the incision dry. · Don't use hydrogen peroxide or alcohol on the incision, which can slow healing. You may cover the area with a gauze bandage if it oozes fluid or rubs against clothing. Change the bandage every day. · Do not take a bath or get into a hot tub for the first 2 weeks, or until your doctor tells you it is okay. Other instructions  · Keep a medical ID card with you at all times that says you have a pacemaker. The card should include the  and model information. · Wear medical alert jewelry stating that you have a pacemaker. You can buy this at most drugstores. · Check your pulse as directed by your doctor. · Have your pacemaker checked as often as your doctor recommends. In some cases, this may be done over the phone or the Internet. Your doctor will give you instructions about how to do this. Follow-up care is a key part of your treatment and safety. Be sure to make and go to all appointments, and call your doctor if you are having problems.  It's also a good idea to know your test results and keep a list of the medicines you take. When should you call for help? [id number] anytime you think you may need emergency care. For example, call if:  · You passed out (lost consciousness). · You have trouble breathing. Call your doctor now or seek immediate medical care if:  · You are dizzy or light-headed, or you feel like you may faint. · You have pain that does not get better after you take pain medicine. · You hear an alarm or feel a vibration from your pacemaker. · You have loose stitches, or your incision comes open. · Bright red blood has soaked through the bandage over your incision. · You have signs of infection, such as:  ? Increased pain, swelling, warmth, or redness. ? Red streaks leading from the incision. ? Pus draining from the incision. ? A fever. Watch closely for changes in your health, and be sure to contact your doctor if:  · You have any problems with your pacemaker. Where can you learn more? Go to http://www.gray.com/  Enter G550 in the search box to learn more about \"Pacemaker Placement: What to Expect at Home. \"  Current as of: December 16, 2019               Content Version: 12.5  © 6478-2633 [employer]. Care instructions adapted under license by [employer] (which disclaims liability or warranty for this information). If you have questions about a medical condition or this instruction, always ask your healthcare professional. [place] [address] any warranty or liability for your use of this information. Learning About Coronary Artery Disease (CAD)  What is coronary artery disease? Coronary artery disease (CAD) occurs when plaque builds up in the arteries that bring oxygen-rich blood to your heart. Plaque is a fatty substance made of cholesterol, calcium, and other substances in the blood. This process is called hardening of the arteries, or atherosclerosis.   What happens when you have coronary artery disease? · Plaque may narrow the coronary arteries. Narrowed arteries cause poor blood flow. This can lead to angina symptoms such as chest pain or discomfort. If blood flow is completely blocked, you could have a heart attack. · You can slow CAD and reduce the risk of future problems by making changes in your lifestyle. These include quitting smoking and eating heart-healthy foods. · Treatments for CAD, along with changes in your lifestyle, can help you live a longer and healthier life. How can you prevent coronary artery disease? · Do not smoke. It may be the best thing you can do to prevent heart disease. If you need help quitting, talk to your doctor about stop-smoking programs and medicines. These can increase your chances of quitting for good. · Be active. Get at least 30 minutes of exercise on most days of the week. Walking is a good choice. You also may want to do other activities, such as running, swimming, cycling, or playing tennis or team sports. · Eat heart-healthy foods. Eat more fruits and vegetables and less foods that contain saturated and trans fats. Limit alcohol, sodium, and sweets. · Stay at a healthy weight. Lose weight if you need to. · Manage other health problems such as diabetes, high blood pressure, and high cholesterol. How is coronary artery disease treated? · Your doctor will suggest that you make lifestyle changes. For example, your doctor may ask you to eat healthy foods, quit smoking, lose extra weight, and be more active. · You will have to take medicines. · Your doctor may suggest a procedure to open narrowed or blocked arteries. This is called angioplasty. Or your doctor may suggest using healthy blood vessels to create detours around narrowed or blocked arteries. This is called bypass surgery. Follow-up care is a key part of your treatment and safety. Be sure to make and go to all appointments, and call your doctor if you are having problems.  It's also a good idea to know your test results and keep a list of the medicines you take. Where can you learn more? Go to http://barbara-alexis.info/  Enter C643 in the search box to learn more about \"Learning About Coronary Artery Disease (CAD). \"  Current as of: December 16, 2019               Content Version: 12.5  © 4824-8850 [employer]. Care instructions adapted under license by [employer] (which disclaims liability or warranty for this information). If you have questions about a medical condition or this instruction, always ask your healthcare professional. [place] [address] any warranty or liability for your use of this information. A Healthy Heart: Care Instructions  Your Care Instructions     Coronary artery disease, also called heart disease, occurs when a substance called plaque builds up in the vessels that supply oxygen-rich blood to your heart muscle. This can narrow the blood vessels and reduce blood flow. A heart attack happens when blood flow is completely blocked. A high-fat diet, smoking, and other factors increase the risk of heart disease. Your doctor has found that you have a chance of having heart disease. You can do lots of things to keep your heart healthy. It may not be easy, but you can change your diet, exercise more, and quit smoking. These steps really work to lower your chance of heart disease. Follow-up care is a key part of your treatment and safety. Be sure to make and go to all appointments, and call your doctor if you are having problems. It's also a good idea to know your test results and keep a list of the medicines you take. How can you care for yourself at home? Diet  · Use less salt when you cook and eat. This helps lower your blood pressure. Taste food before salting. Add only a little salt when you think you need it. With time, your taste buds will adjust to less salt.   · Eat fewer snack items, fast foods, canned soups, and other high-salt, high-fat, processed foods. · Read food labels and try to avoid saturated and trans fats. They increase your risk of heart disease by raising cholesterol levels. · Limit the amount of solid fat-butter, margarine, and shortening-you eat. Use olive, peanut, or canola oil when you cook. Bake, broil, and steam foods instead of frying them. · Eat a variety of fruit and vegetables every day. Dark green, deep orange, red, or yellow fruits and vegetables are especially good for you. Examples include spinach, carrots, peaches, and berries. · Foods high in fiber can reduce your cholesterol and provide important vitamins and minerals. High-fiber foods include whole-grain cereals and breads, oatmeal, beans, brown rice, citrus fruits, and apples. · Eat lean proteins. Heart-healthy proteins include seafood, lean meats and poultry, eggs, beans, peas, nuts, seeds, and soy products. · Limit drinks and foods with added sugar. These include candy, desserts, and soda pop. Lifestyle changes  · If your doctor recommends it, get more exercise. Walking is a good choice. Bit by bit, increase the amount you walk every day. Try for at least 30 minutes on most days of the week. You also may want to swim, bike, or do other activities. · Do not smoke. If you need help quitting, talk to your doctor about stop-smoking programs and medicines. These can increase your chances of quitting for good. Quitting smoking may be the most important step you can take to protect your heart. It is never too late to quit. · Limit alcohol to 2 drinks a day for men and 1 drink a day for women. Too much alcohol can cause health problems. · Manage other health problems such as diabetes, high blood pressure, and high cholesterol. If you think you may have a problem with alcohol or drug use, talk to your doctor. Medicines  · Take your medicines exactly as prescribed.  Call your doctor if you think you are having a problem with your medicine. · If your doctor recommends aspirin, take the amount directed each day. Make sure you take aspirin and not another kind of pain reliever, such as acetaminophen (Tylenol). When should you call for help? [id number] if you have symptoms of a heart attack. These may include:  · Chest pain or pressure, or a strange feeling in the chest.  · Sweating. · Shortness of breath. · Pain, pressure, or a strange feeling in the back, neck, jaw, or upper belly or in one or both shoulders or arms. · Lightheadedness or sudden weakness. · A fast or irregular heartbeat. After you call 911, the  may tell you to chew 1 adult-strength or 2 to 4 low-dose aspirin. Wait for an ambulance. Do not try to drive yourself. Watch closely for changes in your health, and be sure to contact your doctor if you have any problems. Where can you learn more? Go to http://barbara-alexis.info/  Enter F075 in the search box to learn more about \"A Healthy Heart: Care Instructions. \"  Current as of: December 16, 2019               Content Version: 12.5  © 7390-3302 [employer]. Care instructions adapted under license by [employer] (which disclaims liability or warranty for this information). If you have questions about a medical condition or this instruction, always ask your healthcare professional. [place] [address] any warranty or liability for your use of this information. Learning About Implantable Heart Monitors  What is an implantable heart monitor? An implantable heart monitor is a small device placed under the skin of your chest. It records the electrical signals from your heart. A monitor is used to look for irregular heartbeats. It can help your doctor find out what is causing your fainting, lightheadedness, or other symptoms. It also can help your doctor check to see if treatment for an irregular heartbeat is working.   The monitor may be placed near your collarbone or near the middle of your chest. Where it's placed depends on the size and type of device. The monitor may be about the size of a small pack of chewing gum or a paper clip. These monitors are used if an irregular heart rhythm or your symptoms don't happen very often. They also help your doctor monitor your heart for a long time. How is the monitor put in place? The monitor is put in during a short surgery. You will get medicine to numb the area of your chest where the monitor will be put in. You will be awake during the surgery, but you shouldn't feel any pain. Your doctor will make a small cut and place the monitor under your skin. Then he or she will close the cut with special tape or glue or with stitches that will dissolve. Then the doctor will place a bandage over the cut. The procedure will take about half an hour. You probably will be able to go home soon after it's done. You may have some minor pain where the cut was made. You will get instructions from your doctor on how to care for it at home. When your doctor says it's safe, you should be able to get back to your normal activities. How is the monitor used? Your monitor may start recording on its own when it detects an abnormal heartbeat. Or you might use a handheld device to start the monitor when you have symptoms. Your doctor will explain which type of monitor you have and what you need to do. Your doctor will tell you how often he or she will need to check your monitor. The information from your monitor may be sent to your doctor automatically. Or you may have to do something to send it. It may be sent over a phone line or online. You will get instructions from your doctor. Your information will stay private and secure. You will also have checkups in person. You may need to keep a symptom diary while you have the monitor. This means that you will write down the times you have symptoms and what you were doing when they started. Your doctor will let you know how to do this. He or she can then look at your heart monitor records to see if your heart rhythms changed when you had symptoms. After your doctor gets the information he or she needs, the monitor will be removed from your chest.  What else should you know about living with a heart monitor? You will get a medical ID card with information about your heart monitor. Keep it with you at all times. You can safely use most household and office electronics such as kitchen appliances, electric power tools, and computers. You will need to stay away from things with strong magnetic and electrical fields. These include MRI machines (unless your heart monitor is safe for an MRI), welding equipment, and power generators. You can use a cell phone, but keep it at least 6 inches away from your heart monitor. Your doctor or the maker of your heart monitor can give you a full list of things to avoid. When should you call for help? Call your doctor now or seek immediate medical care if:  · You have symptoms of infection, such as:  ? Increased pain, swelling, warmth, or redness around the cut.  ? Red streaks leading from the cut.  ? Pus draining from the cut.  ? A fever. Watch closely for changes in your health, and be sure to contact your doctor if:  · You have any problems with your heart monitor. Follow-up care is a key part of your treatment and safety. Be sure to make and go to all appointments, and call your doctor if you are having problems. It's also a good idea to know your test results and keep a list of the medicines you take. Where can you learn more? Go to http://barbara-alexis.info/  Enter K717 in the search box to learn more about \"Learning About Implantable Heart Monitors. \"  Current as of: December 16, 2019               Content Version: 12.5  © 9061-4259 [employer].    Care instructions adapted under license by [employer] (which disclaims liability or warranty for this information). If you have questions about a medical condition or this instruction, always ask your healthcare professional. [place] [address] any warranty or liability for your use of this information. Patient Education        Bradycardia: Care Instructions  Your Care Instructions     Bradycardia is a slow heart rate. A slow heart rate can be normal and healthy. Or it could be a sign of a problem with the heart's electrical system. If your heart beats too slowly, it may not supply your body with enough blood. This can make you weak or dizzy. Or it may make you pass out. Bradycardia can be caused by many things. This includes medicine, certain medical conditions, and changes in the heart that are the result of aging. How bradycardia is treated depends on what is causing it. Treatments include treating another health problem, changing a medicine, and getting a pacemaker. Treatment also depends on the symptoms. If bradycardia doesn't cause symptoms, it may not be treated. You and your doctor can decide what treatment is right for you. Follow-up care is a key part of your treatment and safety. Be sure to make and go to all appointments, and call your doctor if you are having problems. It's also a good idea to know your test results and keep a list of the medicines you take. How can you care for yourself at home? · Take your medicines exactly as prescribed. Call your doctor if you think you are having a problem with your medicine. If your bradycardia is caused by another disease, your doctor will try to treat the disease. If it is caused by heart medicines, he or she will adjust your medicines. · Make lifestyle changes to improve your heart health. ? Eat a heart-healthy diet that includes vegetables, fruits, nuts, beans, lean meat, fish, and whole grains. Limit alcohol, sodium, and sugar. ? Get regular exercise.  Try for 30 minutes on most days of the week. If you do not have other heart problems, you likely do not have limits on the type or level of activity that you can do. You may want to walk, swim, bike, or do other activities. Ask your doctor what level of exercise is safe for you.  ? Stay at a healthy weight. Lose weight if you need to.  ? Do not smoke. If you need help quitting, talk to your doctor about stop-smoking programs and medicines. These can increase your chances of quitting for good. ? Manage other health problems such as high blood pressure, high cholesterol, and diabetes. · If you get a pacemaker, you will get information about it. When should you call for help? [id number] anytime you think you may need emergency care. For example, call if:  · You have symptoms of sudden heart failure. These may include:  ? Severe trouble breathing. ? A fast or irregular heartbeat. ? Coughing up pink, foamy mucus. ? You passed out. · You have symptoms of a stroke. These may include:  ? Sudden numbness, tingling, weakness, or loss of movement in your face, arm, or leg, especially on only one side of your body. ? Sudden vision changes. ? Sudden trouble speaking. ? Sudden confusion or trouble understanding simple statements. ? Sudden problems with walking or balance. ? A sudden, severe headache that is different from past headaches. Call your doctor now or seek immediate medical care if:  · You have new or changed symptoms of heart failure, such as:  ? New or increased shortness of breath. ? New or worse swelling in your legs, ankles, or feet. ? Sudden weight gain, such as more than 2 to 3 pounds in a day or 5 pounds in a week. (Your doctor may suggest a different range of weight gain.)  ? Feeling dizzy or lightheaded or like you may faint. ? Feeling so tired or weak that you cannot do your usual activities. ? Not sleeping well. Shortness of breath wakes you at night. You need extra pillows to prop yourself up to breathe easier.   Watch closely for changes in your health, and be sure to contact your doctor if:  · You do not get better as expected. Where can you learn more? Go to http://barbara-alexis.info/  Enter O134 in the search box to learn more about \"Bradycardia: Care Instructions. \"  Current as of: 2019               Content Version: 12.5  © 1745-4493 [employer] Care instructions adapted under license by [employer]. (which disclaims liability or warranty for this information). If you have questions about a medical condition or this instruction, always ask your healthcare professional. [address] any warranty or liability for your use of this information. Patient armband removed and shredded    [employer] Activation    Thank you for requesting access to [facility]. Please follow the instructions below to securely access and download your online medical record. [facility] allows you to send messages to your doctor, view your test results, renew your prescriptions, schedule appointments, and more. How Do I Sign Up? 1. In your internet browser, go to [email or web address]  2. Click on the First Time User? Click Here link in the Sign In box. You will be redirect to the New Member Sign Up page. 3. Enter your [facility] Access Code exactly as it appears below. You will not need to use this code after youve completed the sign-up process. If you do not sign up before the expiration date, you must request a new code. [facility] Access Code: Activation code not generated  Current [facility] Status: Active (This is the date your [facility] access code will )    4. Enter the last four digits of your Social Security Number (xxxx) and Date of Birth (mm/dd/yyyy) as indicated and click Submit. You will be taken to the next sign-up page. 5. Create a [facility] ID. This will be your [facility] login ID and cannot be changed, so think of one that is secure and easy to remember.   6. Create a [employer] password. You can change your password at any time. 7. Enter your Password Reset Question and Answer. This can be used at a later time if you forget your password. 8. Enter your e-mail address. You will receive e-mail notification when new information is available in [address] 9. Click Sign Up. You can now view and download portions of your medical record. 10. Click the Download Summary menu link to download a portable copy of your medical information. Additional Information    If you have questions, please visit the Frequently Asked Questions section of the [employer] website at [email or web address] Remember, [facility] is NOT to be used for urgent needs. For medical emergencies, dial 911. DISCHARGE SUMMARY from Nurse    PATIENT INSTRUCTIONS:    After general anesthesia or intravenous sedation, for 24 hours or while taking prescription Narcotics:  · Limit your activities  · Do not drive and operate hazardous machinery  · Do not make important personal or business decisions  · Do  not drink alcoholic beverages  · If you have not urinated within 8 hours after discharge, please contact your surgeon on call. Report the following to your surgeon:  · Excessive pain, swelling, redness or odor of or around the surgical area  · Temperature over 100.5  · Nausea and vomiting lasting longer than 4 hours or if unable to take medications  · Any signs of decreased circulation or nerve impairment to extremity: change in color, persistent  numbness, tingling, coldness or increase pain  · Any questions    What to do at Home:  Recommended activity: Activity as tolerated,     If you experience any of the following symptoms shortness of breath, lightheadedness, dizziness, profuse sweating, low heart rate, nausea, please follow up with 911. *  Please give a list of your current medications to your Primary Care Provider.     *  Please update this list whenever your medications are discontinued, doses are      changed, or new medications (including over-the-counter products) are added. *  Please carry medication information at all times in case of emergency situations. These are general instructions for a healthy lifestyle:    No smoking/ No tobacco products/ Avoid exposure to second hand smoke  Surgeon General's Warning:  Quitting smoking now greatly reduces serious risk to your health. Obesity, smoking, and sedentary lifestyle greatly increases your risk for illness    A healthy diet, regular physical exercise & weight monitoring are important for maintaining a healthy lifestyle    You may be retaining fluid if you have a history of heart failure or if you experience any of the following symptoms:  Weight gain of 3 pounds or more overnight or 5 pounds in a week, increased swelling in our hands or feet or shortness of breath while lying flat in bed. Please call your doctor as soon as you notice any of these symptoms; do not wait until your next office visit. The discharge information has been reviewed with the patient. The patient verbalized understanding. Discharge medications reviewed with the patient and appropriate educational materials and side effects teaching were provided.   ___________________________________________________________________________________________________________________________________

## 2020-08-04 NOTE — PROGRESS NOTES
Discharge order noted for today. Orders reviewed. No needs identified at this time.  remains available if needed.   Tash Rosales RN - Outcomes Manager  [phone]

## 2020-08-04 NOTE — ROUTINE PROCESS
Bedside and Verbal shift change report given to [employer] RN and Winnie RN (oncoming nurse) by Anne Marie Schneider (offgoing nurse). Report included the following information SBAR, Intake/Output, MAR and Recent Results.

## 2020-08-04 NOTE — PROGRESS NOTES
[facility] - Progress Note  Admit Date: 8/3/2020    Assessment:     Hospital Problems  Date Reviewed: 12/3/2019          Codes Class Noted POA    * (Principal) Symptomatic bradycardia ICD-10-CM: R00.1  ICD-9-CM: 427.89  8/4/2020 Unknown        Unstable angina (HCC) ICD-10-CM: I20.0  ICD-9-CM: 411.1  8/3/2020 Unknown        Shortness of breath at rest ICD-10-CM: R06.02  ICD-9-CM: 786.05  8/3/2020 Unknown        Coronary artery disease involving native coronary artery ICD-10-CM: I25.10  ICD-9-CM: 414.01  8/3/2020 Unknown        Dyslipidemia ICD-10-CM: E78.5  ICD-9-CM: 272.4  10/14/2016 Yes              -Acute shortness of breath with concern for ACS. Cath this admission without significant change:    · Normal LV function with EF 65%. Normal LVEDP. · No change from 2016 postprocedure imaging. · Left dominant system with small but patent nondominant RCA. · Left main is patent. · Circumflex is large and dominant. It is widely patent. · Previous proximal LAD stent is widely patent. Moderate size diagonal branch has a smooth 50-55% stenosis which does not appear unstable. · No further coronary evaluation needed at this time.    -Symptomatic bradycardia. Bradycardia with HR 40s reportedly at outside hospital. Toprol 25 discontinued. Now s/p loop recorder. No significant bradycardia on tele. -History of CAD with cath 9/16/2016 and long ERIC to LAD. Remains on Plavix. L dom.  LM patent.  pLAD 70% FFR 0.74 (2.75 x 23-mm Xience ERIC).  mD 95% (2.25 x 12-mm Resolute ERIC).  Cx patent.  RCA patent.  LVEDP 4.  EF 60%.  Mild anterior RWMA.  RA 7.  PA 20/5.  RV 19/1.  W 9.  CO/CI:  6.2/3.2 (TD); 6.5/3.4 (Hugh). -History of hematuria and self-caths  -History of solitary kidney  -TSH 4.45 noted     -Primary cardiologist Dr. Velasquez Senior:     Patient continues to do well. Ambulating without complaints. No bradycardia on tele. Would continue home ASA, plavix, statin.   Will hold off on BB given concern of bradycardia and labile BP this admission. Will plan BP check in office 1 week. [id number] [address] for discharge from cardiac standpoint. Subjective:     No new complaints. No CP. No SOB. No tele events.     Objective:      Patient Vitals for the past 8 hrs:   Temp Pulse Resp BP SpO2   08/04/20 0729 97.6 °F (36.4 °C) 67 17 135/72 96 %   08/04/20 0400 97.4 °F (36.3 °C) 60 16 110/60 97 %         Patient Vitals for the past 96 hrs:   Weight   08/03/20 1121 89.4 kg (197 lb)                    Intake/Output Summary (Last 24 hours) at 8/4/2020 1109  Last data filed at 8/3/2020 1840  Gross per 24 hour   Intake 310 ml   Output 0 ml   Net 310 ml       Physical Exam:  General:  alert, cooperative, no distress, appears stated age  Neck:  no JVD  Lungs:  clear to auscultation bilaterally  Heart:  regular rate and rhythm  Abdomen:  abdomen is soft without significant tenderness, masses, organomegaly or guarding  Extremities:  extremities normal, atraumatic, no cyanosis or edema    Data Review:     Labs: Results:       Chemistry Recent Labs     08/04/20  0139 08/03/20  1116   GLU 94 94    142   K 4.3 4.2    106   CO2 30 30   BUN 33* 23*   CREA 0.93 0.89   CA 8.8 9.1   MG 2.0  --    AGAP 4 6   BUCR 35* 26*      CBC w/Diff Recent Labs     08/04/20  0139 08/03/20  1116   WBC 7.4 7.8   RBC 4.97 5.41   HGB 14.4 16.2*   HCT 43.0 45.8    178   GRANS  --  61   LYMPH  --  30   EOS  --  3      Cardiac Enzymes No results found for: CPK, CK, CKMMB, CKMB, RCK3, CKMBT, CKNDX, CKND1, RAFAELA, TROPT, TROIQ, AYAKA, TROPT, TNIPOC, BNP, BNPP   Coagulation Recent Labs     08/03/20  1127 08/03/20  1116   PTP  --  12.8   INR 0.9 1.0       Lipid Panel Lab Results   Component Value Date/Time    Cholesterol, total 143 09/19/2016 05:09 AM    HDL Cholesterol 38 (L) 09/19/2016 05:09 AM    LDL, calculated 85 09/19/2016 05:09 AM    VLDL, calculated 20 09/19/2016 05:09 AM    Triglyceride 100 09/19/2016 05:09 AM    CHOL/HDL Ratio 3.8 09/19/2016 05:09 AM BNP No results found for: BNP, BNPP, XBNPT   Liver Enzymes No results for input(s): TP, ALB, TBIL, AP in the last 72 hours.     No lab exists for component: SGOT, GPT, DBIL   Digoxin    Thyroid Studies Lab Results   Component Value Date/Time    TSH 4.45 (H) 08/04/2020 01:39 AM          Signed By: BETY Stapleton     August 4, 2020

## 2020-08-04 NOTE — DISCHARGE SUMMARY
Discharge Summary      Patient: Fredo Webber MRN: [id number]  CSN: [id number]    YOB: 1950  Age: 68 y.o. Sex: male    DOA: 8/3/2020 LOS:  LOS: 1 day   Discharge Date: 8/4/20      Admission Diagnoses: Symptomatic bradycardia [R00.1]  Shortness of breath at rest [R06.02]  Unstable angina ([address] [I20.0]    Discharge Diagnoses:    1. Acute shortness of breath- resolved   2. Symptomatic bradycardia- resolved   3. HTN   4. HLD  5. Hx CAD w/ERIC to LAD in 9/2016   6. Hx of solitary kidney   7. Hx hematuria, self-catheterization   8. Chronic NSAID use for chronic lower leg pain   9. Liver lesion suspected on CTA       Discharge Condition: Stable    PHYSICAL EXAM  Visit Vitals  /76 (BP 1 Location: Left arm, BP Patient Position: At rest)   Pulse 73   Temp 97.9 °F (36.6 °C)   Resp 18   Ht 5' 7\" (1.702 m)   Wt 89.4 kg (197 lb)   SpO2 95%   BMI 30.85 kg/m²     General:  alert, cooperative, no distress, appears stated age  Neck:  no JVD  Lungs:  clear to auscultation bilaterally  Heart:  regular rate and rhythm  Abdomen:  abdomen is soft without significant tenderness, masses, organomegaly or guarding  Extremities:  extremities normal, atraumatic, no cyanosis or edema    Hospital Course:   Mr. Camila Kumar is a 67 y/o male with a PMHx of CAD w/ERIC to LAD in 9/2016, solitary kidney, HTN, HLD, recurrent hematuria with urinary self-catheterization, and chronic NSAID use who presented to [facility] with complaints of acute onset SOB. He denied CP, palpitations, LE edema, fever, cough. He was noted to be bradycardic and diaphoretic in the ED and work-up included a negative CTA chest, negative troponin, and an echo with preserved EF. He received one dose of sublingual NTG with almost complete relief of his symptoms. He was then transferred to [facility] for admission and cardiology consultation. Upon arrival he was evaluated by cardiology who then recommended cardiac catheterization.  He was taken for LHC which showed a normal EF with no significant CAD. Afterwards a loop recorder was placed. Because of his bradycardia, his beta-blocker was held. Mr. Deng Weston was kept and monitored overnight without further symptoms or event. He felt well the next day, was able to ambulate well and cardiology cleared him for discharge. He had remained hemodynamically stable. Return precautions were discussed and he was given instructions and prescriptions for the medications as below. His beta-blocker was discontinued. He was also instructed to follow up with his PCP and with cardiology and was then discharged home. Consults:   Cardiology- Dr. Yue Tadeo MD     Significant Diagnostic Studies:   None     Procedures Performed:     Left heart cath 8/3/20:   · Normal LV function with EF 65%. Normal LVEDP. · No change from 2016 postprocedure imaging. · Left dominant system with small but patent nondominant RCA. · Left main is patent. · Circumflex is large and dominant. It is widely patent. · Previous proximal LAD stent is widely patent. Moderate size diagonal branch has a smooth 50-55% stenosis which does not appear unstable. · No further coronary evaluation needed at this time. Loop recorder implanted 8/3/20         Discharge Medications:  Discharge Medication List as of 8/4/2020  1:15 PM      CONTINUE these medications which have NOT CHANGED    Details   atorvastatin (LIPITOR) 40 mg tablet Take 1 tablet by mouth once daily, Normal, Disp-90 Tab,R-2      clopidogreL (PLAVIX) 75 mg tab Take 1 tablet by mouth once daily, Normal, Disp-90 Tab,R-3      alfuzosin SR (UROXATRAL) 10 mg SR tablet TAKE 1 TABLET BY MOUTH ONCE DAILY AFTER SUPPER, Normal, Disp-90 Tab, R-3      aspirin 81 mg chewable tablet Take 81 mg by mouth daily. , Historical Med      glucosamine 1,000 mg tab Take 1,500 mg by mouth daily. , Historical Med      loratadine (ALLERCLEAR) 10 mg tablet Take 10 mg by mouth daily. , Historical Med      docusate sodium (STOOL SOFTENER) 50 mg capsule Take 50 mg by mouth two (2) times a day., Historical Med      multivitamin (ONE A DAY) tablet Take 1 Tab by mouth daily. , Historical Med      diclofenac EC (VOLTAREN) 75 mg EC tablet Take 75 mg by mouth two (2) times a day., Historical Med      diclofenac (VOLTAREN) 1 % gel Apply 2 g to affected area two (2) times a day., Historical Med         STOP taking these medications       metoprolol succinate (TOPROL-XL) 25 mg XL tablet Comments:   Reason for Stopping:                Activity: activity as tolerated    Diet: Cardiac Diet    Wound Care: Keep wound clean and dry, Reinforce dressing PRN and Ice to area for comfort      Follow-up Information     Follow up With Specialties Details Why Contact Info    Read MD [facility] Schedule an appointment as soon as possible for a visit in 1 week F/U  [address] [address]  [phone]      Xiang Ball MD Cardiology Schedule an appointment as soon as possible for a visit in 1 week F/U  [address] [place] [address]      Xiang Ball MD Cardiology In 4 weeks F/U  [facility]  [phone]             Minutes spent on discharge: >30 minutes spent coordinating this discharge (review instructions/follow-up, prescriptions, preparing report for sign off)          PEDRITO Garcia DO   August 7, 2020

## 2020-08-04 NOTE — PROGRESS NOTES
Reason for Admission:  Symptomatic bradycardia [R00.1]  Shortness of breath at rest [R06.02]  Unstable angina ([address] [I20.0]                 RUR Score:    N/A            Plan for utilizing home health:    no                      Likelihood of Readmission:   LOW                         Transition of Care Plan:              Initial assessment completed with patient. Cognitive status of patient: oriented to time, place, person and situation. Face sheet information confirmed:  yes. The patient designates spouse, Bianca Emerson to participate in his discharge plan and to receive any needed information. This patient lives in a single family home with spouse. Patient is able to navigate steps as needed. Prior to hospitalization, patient was considered to be independent with ADLs/IADLS : yes . Patient has a current ACP document on file: no  The spouse will be available to transport patient home upon discharge. The patient has no DME available in the home. Patient is not currently active with home health. Patient has not stayed in a skilled nursing facility or rehab. This patient is on dialysis :no    Currently, the discharge plan is Home. The patient states that he can obtain his medications from the pharmacy, and take his medications as directed. Patient's current insurance is Medicare and [facility]       Care Management Interventions  PCP Verified by CM:  Yes  [facility] (CM Consult): Discharge Planning  Current Support Network: Lives with Spouse  Confirm Follow Up Transport: Family  The Patient and/or Patient Representative was Provided with a Choice of Provider and Agrees with the Discharge Plan?: No  Freedom of Choice List was Provided with Basic Dialogue that Supports the Patient's Individualized Plan of Care/Goals, Treatment Preferences and Shares the Quality Data Associated with the Providers?: No  Discharge Location  Discharge Placement: Home        Observation notice provided in writing to patient and/or caregiver as well as verbal explanation of the policy.   Patients who are in outpatient status also receive the Observation notice    Kellen Brooke RN - Outcomes Manager  [phone]

## 2020-08-04 NOTE — ROUTINE PROCESS
As part of the discharge instructions medications already given today were discused with the patient. The next dose due of all ordered meds was highlighted as part of the medication dischcarge instructions. Discussed with the patient the importance of taking medications as directed, as well as the side effects and adverse reactions to medcations ordered.

## 2020-08-05 NOTE — PROGRESS NOTES
conducted an initial consultation and Spiritual Assessment for Chino Villareal, who is a 68 y.o.,male. Patient's Primary Language is: [place]. According to the patient's EMR Presybeterian Affiliation is: [place]. The reason the Patient came to the hospital is:   Patient Active Problem List    Diagnosis Date Noted    Symptomatic bradycardia 08/04/2020    Unstable angina ([address] 08/03/2020    Shortness of breath at rest 08/03/2020    Coronary artery disease involving native coronary artery 08/03/2020    S/P cystoscopy 10/17/2016    Dyslipidemia 10/14/2016    Hematuria 09/17/2016    S/P coronary artery stent placement with drug eluding stents in the LAD and diagonal 09/17/2016    Chest pain 09/17/2016        The  provided the following Interventions:  Initiated a relationship of care and support. Explored issues of christine, belief, spirituality and Restorationist/ritual needs while hospitalized. Listened empathically. Provided chaplaincy education. Provided information about Spiritual Care Services. Provided a Spiritual Care Pamphlet. Provided information about virtual visits. Offered prayer and assurance of continued prayers on patient's behalf. Chart reviewed. The following outcomes where achieved:  Patient shared limited information about both their medical narrative and spiritual journey/beliefs.  confirmed Patient's Presybeterian Affiliation. Patient processed feeling about current hospitalization. Patient expressed gratitude for 's visit. Assessment:  Patient does not have any Restorationist/cultural needs that will affect patient's preferences in health care. There are no spiritual or Restorationist issues which require intervention at this time. Plan:  Chaplains will continue to follow and will provide pastoral care on an as needed/requested basis.    recommends bedside caregivers page  on duty if patient shows signs of acute spiritual or emotional distress.     [id number] [address]   [phone]

## 2020-08-13 ENCOUNTER — CLINICAL SUPPORT (OUTPATIENT)
Dept: CARDIOLOGY CLINIC | Age: 70
End: 2020-08-13

## 2020-08-13 VITALS
HEART RATE: 77 BPM | HEIGHT: 67 IN | OXYGEN SATURATION: 97 % | WEIGHT: 200 LBS | SYSTOLIC BLOOD PRESSURE: 134 MMHG | DIASTOLIC BLOOD PRESSURE: 90 MMHG | BODY MASS INDEX: 31.39 KG/M2

## 2020-08-13 DIAGNOSIS — I25.110 CORONARY ARTERY DISEASE INVOLVING NATIVE CORONARY ARTERY OF NATIVE HEART WITH UNSTABLE ANGINA PECTORIS (HCC): Primary | ICD-10-CM

## 2020-08-13 RX ORDER — AMLODIPINE BESYLATE 2.5 MG/1
2.5 TABLET ORAL DAILY
Qty: 30 TAB | Refills: 5 | Status: CANCELLED | OUTPATIENT
Start: 2020-08-13

## 2020-08-13 RX ORDER — AMLODIPINE BESYLATE 2.5 MG/1
2.5 TABLET ORAL DAILY
Qty: 30 TAB | Refills: 5 | Status: SHIPPED | OUTPATIENT
Start: 2020-08-13 | End: 2020-09-03

## 2020-08-13 NOTE — PATIENT INSTRUCTIONS
Verbal order and read back per ROGERS Marroquin NP Start Norvasc 2.5mg daily Check blood pressure daily - notify our office if systolic blood pressure less than 90 Keep follow up appointment 9/3/2020 at 1:30pm.

## 2020-08-26 NOTE — PROGRESS NOTES
Dave Damon presents today for a blood pressure follow-up. He has noticed elevated blood pressures since his beta blocker was discontinued due to bradycardia. When seen on 8/13/20, his blood pressure was 134/90 and he was started on amlodipine 2.5mg daily. He is a 68year old male with history of CAD (s/p PCI/stent to the LAD in 9/2016), solitary kidney, HTN, hyperlipidemia, recurrent hematuria with urinary self-catheterization, and chronic NSAID use. He was last seen by Dr. Senthil Acosta in Dec. 2019. His last pharmacologic nuclear stress test was done in Jan. 20120 and it showed a fixed defect consistent with prior myocardial infarction. Myocardial perfusion imaging supports a low risk stress test.  His last echo was done in March 2019 and it showed an EF of 56-60%, no regional WMA and age-appropriate left ventricular diastolic function. He presented to the hospital on 8/3/20 with complaints of acute shortness of breath. He was noted to be bradycardic and diaphoretic in the ED and work-up included a negative CTA chest, negative troponin, and an echo with preserved EF. He received one dose of sublingual NTG with almost complete relief of his symptoms. He was then transferred to [facility] for admission and cardiology consultation. Upon arrival, he was brought to the cath lab and was found to have no significant CAD. Afterwards a loop recorder was placed. Because of his bradycardia, his beta-blocker was held. PMH:  Past Medical History:   Diagnosis Date    Cardiac cath 09/16/2016    L dom. LM patent. pLAD 70% FFR 0.74 (2.75 x 23-mm Xience ERIC). mD 95% (2.25 x 12-mm Resolute ERIC). Cx patent. RCA patent. LVEDP 4.  EF 60%. Mild anterior RWMA. RA 7.  PA 20/5. RV 19/1. W 9.  CO/CI:  6.2/3.2 (TD); 6.5/3.4 (Hugh).       Chest pain     Coronary artery disease     2 stents placed 9/16/2016    Hematuria     Hypertension     Solitary kidney        PSH:  Past Surgical History:   Procedure Laterality Date  CARDIAC CATHETERIZATION  9/16/2016         CORONARY ARTERY ANGIOGRAM  9/16/2016         CORONARY STENT SINGLE W/PTCA  9/16/2016         FRACTIONAL FLOW RESERVE  9/16/2016         HX APPENDECTOMY  1958    HX MOHS PROCEDURES Bilateral 1993    HX NEPHRECTOMY Left 2001    HX TONSILLECTOMY  1951    LV ANGIOGRAPHY  9/16/2016         CO INSERTION SUBQ CARDIAC RHYTHM MONITOR [facility] Left 8/3/2020    Loop Recorder Insert performed by Demetra Bernheim, MD at [facility] C[facility]    RT & LT HEART WITH C.O.  9/16/2016            MEDS:  Current Outpatient Medications   Medication Sig    amLODIPine (NORVASC) 2.5 mg tablet Take 1 Tab by mouth daily.  alfuzosin SR (UROXATRAL) 10 mg SR tablet TAKE 1 TABLET BY MOUTH ONCE DAILY AFTER SUPPER    atorvastatin (LIPITOR) 40 mg tablet Take 1 tablet by mouth once daily    clopidogreL (PLAVIX) 75 mg tab Take 1 tablet by mouth once daily    aspirin 81 mg chewable tablet Take 81 mg by mouth daily.  glucosamine 1,000 mg tab Take 1,500 mg by mouth daily.  loratadine (ALLERCLEAR) 10 mg tablet Take 10 mg by mouth daily.  docusate sodium (STOOL SOFTENER) 50 mg capsule Take 50 mg by mouth two (2) times a day.  multivitamin (ONE A DAY) tablet Take 1 Tab by mouth daily.  diclofenac EC (VOLTAREN) 75 mg EC tablet Take 75 mg by mouth two (2) times a day.  diclofenac (VOLTAREN) 1 % gel Apply 2 g to affected area two (2) times a day. No current facility-administered medications for this visit. Allergies and Sensitivities:  No Known Allergies    Family History:  No family history on file. Social History:  He  reports that he has never smoked. He has never used smokeless tobacco.  He  reports no history of alcohol use. Review of Systems:  As above, otherwise 10 point review negative.       Physical:  Visit Vitals  /88 (BP 1 Location: Left arm, BP Patient Position: Sitting)   Pulse 78   Resp 20   Ht 5' 7\" (1.702 m)   Wt 91.9 kg (202 lb 9.6 oz) SpO2 97%   BMI 31.73 kg/m²       Exam:  Neck:  Supple, no JVD, no carotid bruits  CV:  Normal S1 and  S2, no murmurs, rubs, or gallops noted  Lungs:  Clear to ausculation throughout, no wheezes or rales  Abd:  Soft, non-tender, non-distended with good bowel sounds. No hepatosplenomegaly  Extremities:  No edema      Data:  EKG:  Not done today      LABS:  Lab Results   Component Value Date/Time    Sodium 141 08/04/2020 01:39 AM    Potassium 4.3 08/04/2020 01:39 AM    Chloride 107 08/04/2020 01:39 AM    CO2 30 08/04/2020 01:39 AM    Glucose 94 08/04/2020 01:39 AM    BUN 33 (H) 08/04/2020 01:39 AM    Creatinine 0.93 08/04/2020 01:39 AM     Lab Results   Component Value Date/Time    Cholesterol, total 143 09/19/2016 05:09 AM    HDL Cholesterol 38 (L) 09/19/2016 05:09 AM    LDL, calculated 85 09/19/2016 05:09 AM    Triglyceride 100 09/19/2016 05:09 AM    CHOL/HDL Ratio 3.8 09/19/2016 05:09 AM     Lab Results   Component Value Date/Time    ALT (SGPT) 35 05/05/2017 05:50 PM         Impression/Plan:  1. Symptomatic bradycardia, s/p loop recorder implant  2. CAD, s/p PCI/stent to the LAD in Sept 2016, recent cath showed no significant CAD  3. Essential hypertension, blood pressure elevated and suboptimally controlled  4. Hyperlipidemia, on atorvastatin 40mg  5. Solitary kidney  6. Urinary self catheterization    Mr. Estrella was seen today for blood pressure follow-up after being started on amlodipine 2.5mg daily. His blood pressure remains elevated today at 148/88 and his home BP monitor showed 148/95. His blood pressure readings from 8/19/20 were 141/85 and 147/86. One day, his readings were 171/102 and 159/92. He offers no other cardiac complaints. He denies chest pain, shortness of breath, lightheadedness, or dizziness. At this time, his amlodipine will be increased to 2.5mg BID (total of 5mg per day) and return for follow-up with me in 2 weeks.   He was asked to continue monitoring his blood pressure and to record a baseline reading from the morning (before medications and another reading 2-3 hours after taking his medications). He admits that they do not limit their sodium intake and was unaware of the importance of a low sodium diet. Teaching done today regarding this topic. Patient education material re:  Low sodium diet, foods, DASH diet was attached to his after visit summary. Greater than 50% of a 40 minute visit was spent in discussion, counseling, and answering questions. Questions about loop recorder and bradycardia also answered. Diet and lifestyle modifications were encouraged. He will follow-up with Dr. Carolyn Du as scheduled and as needed. Wade Benites MSN, FNP-BC      Please note:  Portions of this chart were created with Dragon medical speech to text program.  Unrecognized errors may be present.

## 2020-09-03 ENCOUNTER — OFFICE VISIT (OUTPATIENT)
Dept: CARDIOLOGY CLINIC | Age: 70
End: 2020-09-03

## 2020-09-03 VITALS
DIASTOLIC BLOOD PRESSURE: 88 MMHG | OXYGEN SATURATION: 97 % | HEART RATE: 78 BPM | BODY MASS INDEX: 31.8 KG/M2 | RESPIRATION RATE: 20 BRPM | WEIGHT: 202.6 LBS | HEIGHT: 67 IN | SYSTOLIC BLOOD PRESSURE: 148 MMHG

## 2020-09-03 DIAGNOSIS — I25.118 CORONARY ARTERY DISEASE OF NATIVE ARTERY OF NATIVE HEART WITH STABLE ANGINA PECTORIS (HCC): ICD-10-CM

## 2020-09-03 DIAGNOSIS — I10 ESSENTIAL HYPERTENSION: Primary | ICD-10-CM

## 2020-09-03 DIAGNOSIS — E78.5 DYSLIPIDEMIA: ICD-10-CM

## 2020-09-03 RX ORDER — AMLODIPINE BESYLATE 2.5 MG/1
2.5 TABLET ORAL 2 TIMES DAILY
Qty: 60 TAB | Refills: 1 | Status: SHIPPED | OUTPATIENT
Start: 2020-09-03 | End: 2020-09-24 | Stop reason: DRUGHIGH

## 2020-09-03 NOTE — PROGRESS NOTES
Nilogabrielle Adryan presents today for   Chief Complaint   Patient presents with    Blood Pressure Check       Shannon Mansoors Ciprianoozzymoncho preferred language for health care discussion is english/other. Is someone accompanying this pt? yes    Is the patient using any DME equipment during [address]? no    Depression Screening:  3 most recent PHQ Screens 9/3/2020   Little interest or pleasure in doing things Not at all   Feeling down, depressed, irritable, or hopeless Not at all   Total Score PHQ 2 0       Learning Assessment:  Learning Assessment 12/3/2019   PRIMARY LEARNER Patient   PRIMARY LANGUAGE ENGLISH   LEARNER PREFERENCE PRIMARY DEMONSTRATION   ANSWERED BY Patient   RELATIONSHIP SELF       Abuse Screening:  Abuse Screening Questionnaire 12/3/2019   Do you ever feel afraid of your partner? N   Are you in a relationship with someone who physically or mentally threatens you? N   Is it safe for you to go home? Y       Fall Risk  Fall Risk Assessment, last 12 mths 9/3/2020   Able to walk? Yes   Fall in past 12 months? No       Pt currently taking Anticoagulant therapy? aspirin    Coordination of Care:  1. Have you been to the ER, urgent care clinic since your last visit? Hospitalized since your last visit? no    2. Have you seen or consulted any other health care providers outside of the [address] [place] [address] since your last visit? Include any pap smears or colon screening.  N/a

## 2020-09-03 NOTE — PATIENT INSTRUCTIONS
Increase amlodipine to 2.5mg twice a day Continue to monitor blood pressure at home and record Follow-up with Paula Sorto in 2 weeks Low sodium diet 2000mg per day Low Sodium Diet (2,000 Milligram): Care Instructions Your Care Instructions Too much sodium causes your body to hold on to extra water. This can raise your blood pressure and force your heart and kidneys to work harder. In very serious cases, this could cause you to be put in the hospital. It might even be life-threatening. By limiting sodium, you will feel better and lower your risk of serious problems. The most common source of sodium is salt. People get most of the salt in their diet from canned, prepared, and packaged foods. Fast food and restaurant meals also are very high in sodium. Your doctor will probably limit your sodium to less than 2,000 milligrams (mg) a day. This limit counts all the sodium in prepared and packaged foods and any salt you add to your food. Follow-up care is a key part of your treatment and safety. Be sure to make and go to all appointments, and call your doctor if you are having problems. It's also a good idea to know your test results and keep a list of the medicines you take. How can you care for yourself at home? Read food labels · Read labels on cans and food packages. The labels tell you how much sodium is in each serving. Make sure that you look at the serving size. If you eat more than the serving size, you have eaten more sodium. · Food labels also tell you the Percent Daily Value for sodium. Choose products with low Percent Daily Values for sodium. · Be aware that sodium can come in forms other than salt, including monosodium glutamate (MSG), sodium citrate, and sodium bicarbonate (baking soda). MSG is often added to Asian food. When you eat out, you can sometimes ask for food without MSG or added salt. Buy low-sodium foods · Buy foods that are labeled \"unsalted\" (no salt added), \"sodium-free\" (less than 5 mg of sodium per serving), or \"low-sodium\" (less than 140 mg of sodium per serving). Foods labeled \"reduced-sodium\" and \"light sodium\" may still have too much sodium. Be sure to read the label to see how much sodium you are getting. · Buy fresh vegetables, or frozen vegetables without added sauces. Buy low-sodium versions of canned vegetables, soups, and other canned goods. Prepare low-sodium meals · Cut back on the amount of salt you use in cooking. This will help you adjust to the taste. Do not add salt after cooking. One teaspoon of salt has about 2,300 mg of sodium. · Take the salt shaker off the table. · Flavor your food with garlic, lemon juice, onion, vinegar, herbs, and spices. Do not use soy sauce, lite soy sauce, steak sauce, onion salt, garlic salt, celery salt, mustard, or ketchup on your food. · Use low-sodium salad dressings, sauces, and ketchup. Or make your own salad dressings and sauces without adding salt. · Use less salt (or none) when recipes call for it. You can often use half the salt a recipe calls for without losing flavor. Other foods such as rice, pasta, and grains do not need added salt. · Rinse canned vegetables, and cook them in fresh water. This removes somebut not allof the salt. · Avoid water that is naturally high in sodium or that has been treated with water softeners, which add sodium. Call your local water company to find out the sodium content of your water supply. If you buy bottled water, read the label and choose a sodium-free brand. Avoid high-sodium foods · Avoid eating: 
? Smoked, cured, salted, and canned meat, fish, and poultry. ? Ham, mejia, hot dogs, and luncheon meats. ? Regular, hard, and processed cheese and regular peanut butter. ? Crackers with salted tops, and other salted snack foods such as pretzels, chips, and salted popcorn. ? Frozen prepared meals, unless labeled low-sodium. ? Canned and dried soups, broths, and bouillon, unless labeled sodium-free or low-sodium. ? Canned vegetables, unless labeled sodium-free or low-sodium. ? Western Radha fries, pizza, tacos, and other fast foods. ? Pickles, olives, ketchup, and other condiments, especially soy sauce, unless labeled sodium-free or low-sodium. Where can you learn more? Go to http://www.gray.com/ Enter A019 in the search box to learn more about \"Low Sodium Diet (2,000 Milligram): Care Instructions. \" Current as of: August 22, 2019               Content Version: 12.5 © 6280-4167 [employer]. Care instructions adapted under license by [employer] (which disclaims liability or warranty for this information). If you have questions about a medical condition or this instruction, always ask your healthcare professional. [place] [address] any warranty or liability for your use of this information. Learning About Low-Sodium Foods What foods are low in sodium? The foods you eat contain nutrients, such as vitamins and minerals. Sodium is a nutrient. Your body needs the right amount to stay healthy and work as it should. You can use the list below to help you make choices about which foods to eat. Fruits · Fresh, frozen, canned, or dried fruit Vegetables · Fresh or frozen vegetables, with no added salt · Canned vegetables, low-sodium or with no added salt Grains · Bagels without salted tops · Cereal with no added salt · Corn tortillas · Crackers with no added salt · Oatmeal, cooked without salt · Popcorn with no salt · Pasta and noodles, cooked without salt · Rice, cooked without salt · Unsalted pretzels Dairy and dairy alternatives · Butter, unsalted · Cream cheese · Ice cream 
· Milk · Soy milk Meats and other protein foods · Beans and peas, canned with no salt · Eggs · Fresh fish (not smoked) · Fresh meats (not smoked or cured) · Nuts and nut butter, prepared without salt · Poultry, not packaged with sodium solution · Tofu, unseasoned · Tuna, canned without salt Seasonings · Garlic · Herbs and spices · Lemon juice · Mustard · Olive oil · Salt-free seasoning mixes · Vinegar Work with your doctor to find out how much of this nutrient you need. Depending on your health, you may need more or less of it in your diet. Where can you learn more? Go to http://barbara-alexis.info/ Enter K412 in the search box to learn more about \"Learning About Low-Sodium Foods. \" Current as of: August 22, 2019               Content Version: 12.6 © 7582-6164 [email or web address]. Care instructions adapted under license by [employer] (which disclaims liability or warranty for this information). If you have questions about a medical condition or this instruction, always ask your healthcare professional. [address] any warranty or liability for your use of this information. DASH Diet: Care Instructions Your Care Instructions The DASH diet is an eating plan that can help lower your blood pressure. DASH stands for Dietary Approaches to Stop Hypertension. Hypertension is high blood pressure. The DASH diet focuses on eating foods that are high in calcium, potassium, and magnesium. These nutrients can lower blood pressure. The foods that are highest in these nutrients are fruits, vegetables, low-fat dairy products, nuts, seeds, and legumes. But taking calcium, potassium, and magnesium supplements instead of eating foods that are high in those nutrients does not have the same effect. The DASH diet also includes whole grains, fish, and poultry. The DASH diet is one of several lifestyle changes your doctor may recommend to lower your high blood pressure. Your doctor may also want you to decrease the amount of sodium in your diet.  Lowering sodium while following the DASH diet can lower blood pressure even further than just the DASH diet alone. Follow-up care is a key part of your treatment and safety. Be sure to make and go to all appointments, and call your doctor if you are having problems. It's also a good idea to know your test results and keep a list of the medicines you take. How can you care for yourself at home? Following the DASH diet · Eat 4 to 5 servings of fruit each day. A serving is 1 medium-sized piece of fruit, ½ cup chopped or canned fruit, 1/4 cup dried fruit, or 4 ounces (½ cup) of fruit juice. Choose fruit more often than fruit juice. · Eat 4 to 5 servings of vegetables each day. A serving is 1 cup of lettuce or raw leafy vegetables, ½ cup of chopped or cooked vegetables, or 4 ounces (½ cup) of vegetable juice. Choose vegetables more often than vegetable juice. · Get 2 to 3 servings of low-fat and fat-free dairy each day. A serving is 8 ounces of milk, 1 cup of yogurt, or 1 ½ ounces of cheese. · Eat 6 to 8 servings of grains each day. A serving is 1 slice of bread, 1 ounce of dry cereal, or ½ cup of cooked rice, pasta, or cooked cereal. Try to choose whole-grain products as much as possible. · Limit lean meat, poultry, and fish to 2 servings each day. A serving is 3 ounces, about the size of a deck of cards. · Eat 4 to 5 servings of nuts, seeds, and legumes (cooked dried beans, lentils, and split peas) each week. A serving is 1/3 cup of nuts, 2 tablespoons of seeds, or ½ cup of cooked beans or peas. · Limit fats and oils to 2 to 3 servings each day. A serving is 1 teaspoon of vegetable oil or 2 tablespoons of salad dressing. · Limit sweets and added sugars to 5 servings or less a week. A serving is 1 tablespoon jelly or jam, ½ cup sorbet, or 1 cup of lemonade. · Eat less than 2,300 milligrams (mg) of sodium a day. If you limit your sodium to 1,500 mg a day, you can lower your blood pressure even more. Tips for success · Start small. Do not try to make dramatic changes to your diet all at once. You might feel that you are missing out on your favorite foods and then be more likely to not follow the plan. Make small changes, and stick with them. Once those changes become habit, add a few more changes. · Try some of the following: ? Make it a goal to eat a fruit or vegetable at every meal and at snacks. This will make it easy to get the recommended amount of fruits and vegetables each day. ? Try yogurt topped with fruit and nuts for a snack or healthy dessert. ? Add lettuce, tomato, cucumber, and onion to sandwiches. ? Combine a ready-made pizza crust with low-fat mozzarella cheese and lots of vegetable toppings. Try using tomatoes, squash, spinach, broccoli, carrots, cauliflower, and onions. ? Have a variety of cut-up vegetables with a low-fat dip as an appetizer instead of chips and dip. ? Sprinkle sunflower seeds or chopped almonds over salads. Or try adding chopped walnuts or almonds to cooked vegetables. ? Try some vegetarian meals using beans and peas. Add garbanzo or kidney beans to salads. Make burritos and tacos with mashed dunn beans or black beans. Where can you learn more? Go to http://barbara-alexis.info/ Enter V777 in the search box to learn more about \"DASH Diet: Care Instructions. \" Current as of: December 16, 2019               Content Version: 12.5 © 5318-4932 [employer]. Care instructions adapted under license by [employer] (which disclaims liability or warranty for this information). If you have questions about a medical condition or this instruction, always ask your healthcare professional. [address] any warranty or liability for your use of this information.

## 2020-09-17 NOTE — PROGRESS NOTES
Mariaelena Jones presents today for a blood pressure follow-up. When seen 2 weeks ago, his amlodipine was increased to 2.5mg BID. He has been monitoring his blood pressures at home (pre-medication in the morning) but was checking it again soon after taking the medications and not waiting 2-3 hours. His blood pressures have been in the 952'Z to 823'M systolic and in the 39'L to 98'D diastolic. He reports that he and his wife cleaned out their pantry and removed all of the food products that were not low sodium or no sodium added. They have been more mindful of their food choices at home and when eating out. He is a 68year old male with history of CAD (s/p PCI/stent to the LAD in 9/2016), solitary kidney, HTN, hyperlipidemia, recurrent hematuria with urinary self-catheterization, and chronic NSAID use. He was last seen by Dr. Natalia Cobian in Dec. 2019. His last pharmacologic nuclear stress test was done in Jan. 20120 and it showed a fixed defect consistent with prior myocardial infarction. Myocardial perfusion imaging supports a low risk stress test.  His last echo was done in March 2019 and it showed an EF of 56-60%, no regional WMA and age-appropriate left ventricular diastolic function. He presented to the hospital on 8/3/20 with complaints of acute shortness of breath. He was noted to be bradycardic and diaphoretic in the ED and work-up included a negative CTA chest, negative troponin, and an echo with preserved EF. He received one dose of sublingual NTG with almost complete relief of his symptoms. He was then transferred to [facility] for admission and cardiology consultation. Upon arrival, he was brought to the cath lab and was found to have no significant CAD. Afterwards a loop recorder was placed. Because of his bradycardia, his beta-blocker was held. Denies chest pain, tightness, heaviness, and palpitations. Denies shortness of breath at rest, dyspnea on exertion, orthopnea and PND.    Denies abdominal bloating. Denies lightheadedness, dizziness, and syncope. Denies lower extremity edema and claudication. Denies nausea, vomiting, diarrhea, melena, hematochezia. Denies hematuria, urgency, frequency. Denies fever, chills. PMH:  Past Medical History:   Diagnosis Date    Cardiac cath 09/16/2016    L dom. LM patent. pLAD 70% FFR 0.74 (2.75 x 23-mm Xience ERIC). mD 95% (2.25 x 12-mm Resolute ERIC). Cx patent. RCA patent. LVEDP 4.  EF 60%. Mild anterior RWMA. RA 7.  PA 20/5. RV 19/1. W 9.  CO/CI:  6.2/3.2 (TD); 6.5/3.4 (Hugh).  Chest pain     Coronary artery disease     2 stents placed 9/16/2016    Hematuria     Hypertension     Solitary kidney        PSH:  Past Surgical History:   Procedure Laterality Date    CARDIAC CATHETERIZATION  9/16/2016         CORONARY ARTERY ANGIOGRAM  9/16/2016         CORONARY STENT SINGLE W/PTCA  9/16/2016         FRACTIONAL FLOW RESERVE  9/16/2016         HX APPENDECTOMY  1958    HX MOHS PROCEDURES Bilateral 1993    HX NEPHRECTOMY Left 2001    HX TONSILLECTOMY  1951    LV ANGIOGRAPHY  9/16/2016         AR INSERTION SUBQ CARDIAC RHYTHM MONITOR W/PRGRMG Left 8/3/2020    Loop Recorder Insert performed by Paul Chaudhari MD at [facility] C[facility] LAB    RT & LT HEART WITH C.O.  9/16/2016            MEDS:  Current Outpatient Medications   Medication Sig    gabapentin (NEURONTIN) 300 mg capsule Take 300 mg by mouth daily.  amLODIPine (NORVASC) 2.5 mg tablet Take 1 Tab by mouth two (2) times a day.  alfuzosin SR (UROXATRAL) 10 mg SR tablet TAKE 1 TABLET BY MOUTH ONCE DAILY AFTER SUPPER    atorvastatin (LIPITOR) 40 mg tablet Take 1 tablet by mouth once daily    clopidogreL (PLAVIX) 75 mg tab Take 1 tablet by mouth once daily    aspirin 81 mg chewable tablet Take 81 mg by mouth daily.  glucosamine 1,000 mg tab Take 1,500 mg by mouth daily.  loratadine (ALLERCLEAR) 10 mg tablet Take 10 mg by mouth daily.     docusate sodium (STOOL SOFTENER) 50 mg capsule Take 50 mg by mouth two (2) times a day.  multivitamin (ONE A DAY) tablet Take 1 Tab by mouth daily.  diclofenac EC (VOLTAREN) 75 mg EC tablet Take 75 mg by mouth two (2) times a day.  diclofenac (VOLTAREN) 1 % gel Apply 2 g to affected area two (2) times a day. No current facility-administered medications for this visit. Allergies and Sensitivities:  No Known Allergies    Family History:  No family history on file. Social History:  He  reports that he has never smoked. He has never used smokeless tobacco.  He  reports no history of alcohol use. Review of Systems:  As above, otherwise 10 point review negative. Physical:  Visit Vitals  BP (!) 156/90   Pulse 76   Ht 5' 7\" (1.702 m)   Wt 90.4 kg (199 lb 3.2 oz)   SpO2 98%   BMI 31.20 kg/m²     Weight is down 3 pounds      Exam:  Neck:  Supple, no JVD, no carotid bruits  CV:  Normal S1 and  S2, no murmurs, rubs, or gallops noted  Lungs:  Clear to ausculation throughout, no wheezes or rales  Abd:  Soft, non-tender, non-distended with good bowel sounds. No hepatosplenomegaly  Extremities:  No edema      Data:  EKG:  Not done today      LABS:  Lab Results   Component Value Date/Time    Sodium 141 08/04/2020 01:39 AM    Potassium 4.3 08/04/2020 01:39 AM    Chloride 107 08/04/2020 01:39 AM    CO2 30 08/04/2020 01:39 AM    Glucose 94 08/04/2020 01:39 AM    BUN 33 (H) 08/04/2020 01:39 AM    Creatinine 0.93 08/04/2020 01:39 AM     Lab Results   Component Value Date/Time    Cholesterol, total 143 09/19/2016 05:09 AM    HDL Cholesterol 38 (L) 09/19/2016 05:09 AM    LDL, calculated 85 09/19/2016 05:09 AM    Triglyceride 100 09/19/2016 05:09 AM    CHOL/HDL Ratio 3.8 09/19/2016 05:09 AM     Lab Results   Component Value Date/Time    ALT (SGPT) 35 05/05/2017 05:50 PM         Impression/Plan:  1. Symptomatic bradycardia, s/p loop recorder implant  2. CAD, s/p PCI/stent to the LAD in Sept 2016, recent cath showed no significant CAD  3. Essential hypertension, blood pressure elevated and suboptimally controlled  4. Hyperlipidemia, on atorvastatin 40mg  5. Solitary kidney  6. Urinary self catheterization    Mr. Estrella was seen today for blood pressure follow-up after his amlodipine was increased to 2.5mg BID. He and his wife have modified their diet and are really trying to limit their sodium intake. They have removed all of the high sodium foods from their pantry and replaced it with low sodium/no sodium added products. They have been more mindful of their food choices at home and when they eat out. Positive reinforcement was given for dietary changes that they have made. His blood pressure remains elevated and suboptimally controlled. When I rechecked it, it was 156/90. Will increase his amlodipine to 5mg BID and I asked that he return in 3 weeks for blood pressure follow-up. He has been keeping a blood pressure diary but was checking his blood pressure within one hour of taking his medications so the readings were still elevated. I asked that he check his blood pressures about 2-3 hours after taking his medications. We discussed other possible medications if his blood pressure remains elevated. Although a diuretic is an option, he only has one kidney and he does urinary self catheterization so he would like this to be a last option if possible. He will follow-up with Dr. Rina Grant as scheduled and as needed. Collin Gregory MSN, FNP-BC      Please note:  Portions of this chart were created with Dragon medical speech to text program.  Unrecognized errors may be present.

## 2020-09-24 ENCOUNTER — OFFICE VISIT (OUTPATIENT)
Dept: CARDIOLOGY CLINIC | Age: 70
End: 2020-09-24
Payer: MEDICARE

## 2020-09-24 VITALS
HEART RATE: 76 BPM | HEIGHT: 67 IN | OXYGEN SATURATION: 98 % | WEIGHT: 199.2 LBS | SYSTOLIC BLOOD PRESSURE: 156 MMHG | BODY MASS INDEX: 31.27 KG/M2 | DIASTOLIC BLOOD PRESSURE: 90 MMHG

## 2020-09-24 DIAGNOSIS — I25.118 CORONARY ARTERY DISEASE OF NATIVE ARTERY OF NATIVE HEART WITH STABLE ANGINA PECTORIS (HCC): ICD-10-CM

## 2020-09-24 DIAGNOSIS — E78.5 DYSLIPIDEMIA: ICD-10-CM

## 2020-09-24 DIAGNOSIS — I10 ESSENTIAL HYPERTENSION: Primary | ICD-10-CM

## 2020-09-24 PROCEDURE — 1101F PT FALLS ASSESS-DOCD LE1/YR: CPT | Performed by: NURSE PRACTITIONER

## 2020-09-24 PROCEDURE — G8427 DOCREV CUR MEDS BY ELIG CLIN: HCPCS | Performed by: NURSE PRACTITIONER

## 2020-09-24 PROCEDURE — G8432 DEP SCR NOT DOC, RNG: HCPCS | Performed by: NURSE PRACTITIONER

## 2020-09-24 PROCEDURE — G8417 CALC BMI ABV UP PARAM F/U: HCPCS | Performed by: NURSE PRACTITIONER

## 2020-09-24 PROCEDURE — 3017F COLORECTAL CA SCREEN DOC REV: CPT | Performed by: NURSE PRACTITIONER

## 2020-09-24 PROCEDURE — 99213 OFFICE O/P EST LOW 20 MIN: CPT | Performed by: NURSE PRACTITIONER

## 2020-09-24 PROCEDURE — G8536 NO DOC ELDER MAL SCRN: HCPCS | Performed by: NURSE PRACTITIONER

## 2020-09-24 RX ORDER — AMLODIPINE BESYLATE 5 MG/1
5 TABLET ORAL 2 TIMES DAILY
Qty: 60 TAB | Refills: 6 | Status: SHIPPED | OUTPATIENT
Start: 2020-09-24 | End: 2021-02-26 | Stop reason: SDUPTHER

## 2020-09-24 RX ORDER — GABAPENTIN 300 MG/1
300 CAPSULE ORAL DAILY
COMMUNITY
End: 2020-12-10

## 2020-09-24 NOTE — PATIENT INSTRUCTIONS
Increase amlodipine to 5mg twice a day All other medications to remain the same Follow-up with Jossue Fabian in 3 weeks

## 2020-10-06 NOTE — PROGRESS NOTES
Price Garvin presents today for a blood pressure follow-up after his amlodipine was increased to 5mg BID for continued suboptimally controlled blood pressure. He has tolerated the increase and has noticed improvement in his blood pressures although his diastolic blood pressure has still been in the mid to upper 80's. He is a 68year old male with history of CAD (s/p PCI/stent to the LAD in 9/2016), solitary kidney, HTN, hyperlipidemia, recurrent hematuria with urinary self-catheterization, and chronic NSAID use. He was last seen by Dr. Bradley Aguirre in Dec. 2019. His last pharmacologic nuclear stress test was done in Jan. 20120 and it showed a fixed defect consistent with prior myocardial infarction. Myocardial perfusion imaging supports a low risk stress test.  His last echo was done in March 2019 and it showed an EF of 56-60%, no regional WMA and age-appropriate left ventricular diastolic function. He presented to the hospital on 8/3/20 with complaints of acute shortness of breath. He was noted to be bradycardic and diaphoretic in the ED and work-up included a negative CTA chest, negative troponin, and an echo with preserved EF. He received one dose of sublingual NTG with almost complete relief of his symptoms. He was then transferred to [facility] for admission and cardiology consultation. Upon arrival, he was brought to the cath lab and was found to have no significant CAD. Afterwards a loop recorder was placed. Because of his bradycardia, his beta-blocker was held. He is feeling well  Denies chest pain, tightness, heaviness, and palpitations. Denies shortness of breath at rest, dyspnea on exertion, orthopnea and PND. Denies abdominal bloating. Denies lightheadedness, dizziness, and syncope. Denies lower extremity edema and claudication. Denies nausea, vomiting, diarrhea, melena, hematochezia. Denies hematuria, urgency, frequency. Denies fever, chills.         PMH:  Past Medical History:   Diagnosis Date    Cardiac cath 09/16/2016    L dom. LM patent. pLAD 70% FFR 0.74 (2.75 x 23-mm Xience ERIC). mD 95% (2.25 x 12-mm Resolute ERIC). Cx patent. RCA patent. LVEDP 4.  EF 60%. Mild anterior RWMA. RA 7.  PA 20/5. RV 19/1. W 9.  CO/CI:  6.2/3.2 (TD); 6.5/3.4 (Hugh).  Chest pain     Coronary artery disease     2 stents placed 9/16/2016    Hematuria     Hypertension     Solitary kidney        PSH:  Past Surgical History:   Procedure Laterality Date    CARDIAC CATHETERIZATION  9/16/2016         CORONARY ARTERY ANGIOGRAM  9/16/2016         CORONARY STENT SINGLE W/PTCA  9/16/2016         FRACTIONAL FLOW RESERVE  9/16/2016         HX APPENDECTOMY  1958    HX MOHS PROCEDURES Bilateral 1993    HX NEPHRECTOMY Left 2001    HX TONSILLECTOMY  1951    LV ANGIOGRAPHY  9/16/2016         SD INSERTION SUBQ CARDIAC RHYTHM MONITOR W/PRGRMG Left 8/3/2020    Loop Recorder Insert performed by Remington Bueno MD at [facility] CATH LAB    RT & LT HEART WITH C.O.  9/16/2016            MEDS:  Current Outpatient Medications   Medication Sig    gabapentin (NEURONTIN) 300 mg capsule Take 300 mg by mouth daily.  amLODIPine (NORVASC) 5 mg tablet Take 1 Tab by mouth two (2) times a day.  alfuzosin SR (UROXATRAL) 10 mg SR tablet TAKE 1 TABLET BY MOUTH ONCE DAILY AFTER SUPPER    atorvastatin (LIPITOR) 40 mg tablet Take 1 tablet by mouth once daily    clopidogreL (PLAVIX) 75 mg tab Take 1 tablet by mouth once daily    aspirin 81 mg chewable tablet Take 81 mg by mouth daily.  glucosamine 1,000 mg tab Take 1,500 mg by mouth daily.  loratadine (ALLERCLEAR) 10 mg tablet Take 10 mg by mouth daily.  docusate sodium (STOOL SOFTENER) 50 mg capsule Take 50 mg by mouth two (2) times a day.  multivitamin (ONE A DAY) tablet Take 1 Tab by mouth daily.  diclofenac EC (VOLTAREN) 75 mg EC tablet Take 75 mg by mouth two (2) times a day.     diclofenac (VOLTAREN) 1 % gel Apply 2 g to affected area two (2) times a day.     No current facility-administered medications for this visit. Allergies and Sensitivities:  No Known Allergies    Family History:  No family history on file. Social History:  He  reports that he has never smoked. He has never used smokeless tobacco.  He  reports no history of alcohol use. Review of Systems:  As above, otherwise 10 point review negative. Physical:  Visit Vitals  /88 (BP 1 Location: Right arm, BP Patient Position: Sitting)   Pulse 73   Ht 5' 7\" (1.702 m)   Wt 91.6 kg (202 lb)   SpO2 98%   BMI 31.64 kg/m²       His weight is up 3 pounds      Exam:  Neck:  Supple, no JVD, no carotid bruits  CV:  Normal S1 and  S2, no murmurs, rubs, or gallops noted  Lungs:  Clear to ausculation throughout, no wheezes or rales  Abd:  Soft, non-tender, non-distended with good bowel sounds. No hepatosplenomegaly  Extremities:  Trace lower extremity edema      Data:  EKG:  Not done today      LABS:  Lab Results   Component Value Date/Time    Sodium 141 08/04/2020 01:39 AM    Potassium 4.3 08/04/2020 01:39 AM    Chloride 107 08/04/2020 01:39 AM    CO2 30 08/04/2020 01:39 AM    Glucose 94 08/04/2020 01:39 AM    BUN 33 (H) 08/04/2020 01:39 AM    Creatinine 0.93 08/04/2020 01:39 AM     Lab Results   Component Value Date/Time    Cholesterol, total 143 09/19/2016 05:09 AM    HDL Cholesterol 38 (L) 09/19/2016 05:09 AM    LDL, calculated 85 09/19/2016 05:09 AM    Triglyceride 100 09/19/2016 05:09 AM    CHOL/HDL Ratio 3.8 09/19/2016 05:09 AM     Lab Results   Component Value Date/Time    ALT (SGPT) 35 05/05/2017 05:50 PM         Impression/Plan:  1. Symptomatic bradycardia, s/p loop recorder implant, currently off of his beta blocker  2. CAD, s/p PCI/stent to the LAD in Sept 2016, recent cath showed no significant CAD  3. Essential hypertension, blood pressure much better controlled  4. Hyperlipidemia, on atorvastatin 40mg  5. Solitary kidney  6. Urinary self catheterization    Mr. Estrella was seen today for blood pressure follow-up after his amlodipine was increased to 5mg BID. He has tolerated the increase in amlodipine but has noticed some mild edema. However, he admits that while his daughter was visiting, they ate more salty foods than they normally do. He has since resumed his healthier eating pattern. He and his significant other have been monitoring his blood pressures at home and recording the readings. His systolic pressures have ranged from the 120's to 150's over 70's to upper 36'T diastolic. Will add a low dose ARB, losartan 25mg daily, to optimize blood pressure control. In view of having a solitary kidney, will check a BMP prior to him returning for follow-up in 2 weeks. Otherwise, he offers no other complaints and states that he has been feeling well. He was reminded not to use NSAIDs routinely. He complains of peripheral neuropathy pain and has been taking gabapentin. He will discuss with his PCP regarding possibly increasing the dose of the medication so he does not have to use NSAIDs as often. He will follow-up with Dr. Kristofer Nickerson as scheduled and as needed. Manuel Barrera MSN, FNP-BC      Please note:  Portions of this chart were created with Dragon medical speech to text program.  Unrecognized errors may be present.

## 2020-10-15 ENCOUNTER — OFFICE VISIT (OUTPATIENT)
Dept: CARDIOLOGY CLINIC | Age: 70
End: 2020-10-15
Payer: MEDICARE

## 2020-10-15 VITALS
WEIGHT: 202 LBS | OXYGEN SATURATION: 98 % | HEART RATE: 73 BPM | DIASTOLIC BLOOD PRESSURE: 88 MMHG | SYSTOLIC BLOOD PRESSURE: 132 MMHG | BODY MASS INDEX: 31.71 KG/M2 | HEIGHT: 67 IN

## 2020-10-15 DIAGNOSIS — I25.118 CORONARY ARTERY DISEASE OF NATIVE ARTERY OF NATIVE HEART WITH STABLE ANGINA PECTORIS (HCC): ICD-10-CM

## 2020-10-15 DIAGNOSIS — I10 ESSENTIAL HYPERTENSION: Primary | ICD-10-CM

## 2020-10-15 DIAGNOSIS — E78.5 DYSLIPIDEMIA: ICD-10-CM

## 2020-10-15 PROCEDURE — G8536 NO DOC ELDER MAL SCRN: HCPCS | Performed by: NURSE PRACTITIONER

## 2020-10-15 PROCEDURE — 99213 OFFICE O/P EST LOW 20 MIN: CPT | Performed by: NURSE PRACTITIONER

## 2020-10-15 PROCEDURE — G8432 DEP SCR NOT DOC, RNG: HCPCS | Performed by: NURSE PRACTITIONER

## 2020-10-15 PROCEDURE — G8417 CALC BMI ABV UP PARAM F/U: HCPCS | Performed by: NURSE PRACTITIONER

## 2020-10-15 PROCEDURE — G8427 DOCREV CUR MEDS BY ELIG CLIN: HCPCS | Performed by: NURSE PRACTITIONER

## 2020-10-15 RX ORDER — LOSARTAN POTASSIUM 25 MG/1
25 TABLET ORAL DAILY
Qty: 30 TAB | Refills: 6 | Status: SHIPPED | OUTPATIENT
Start: 2020-10-15 | End: 2021-02-26 | Stop reason: SDUPTHER

## 2020-10-15 NOTE — PATIENT INSTRUCTIONS
Begin losartan 25mg once a day All other medications to remain the same BMP to be done prior to returning for follow-up Follow-up with Fran Mason in 2 weeks

## 2020-10-26 NOTE — PROGRESS NOTES
Jennifer Emmanuel presents today for a blood pressure follow-up after being started on losartan 25mg daily to assist with blood pressure control. He has tolerated the medication and denies side effects. He is a 68year old male with history of CAD (s/p PCI/stent to the LAD in 9/2016), solitary kidney, HTN, hyperlipidemia, recurrent hematuria with urinary self-catheterization, and chronic NSAID use. He was last seen by Dr. Gustavo Mensah in Dec. 2019. His last pharmacologic nuclear stress test was done in Jan. 20120 and it showed a fixed defect consistent with prior myocardial infarction. Myocardial perfusion imaging supports a low risk stress test.  His last echo was done in March 2019 and it showed an EF of 56-60%, no regional WMA and age-appropriate left ventricular diastolic function. He presented to the hospital on 8/3/20 with complaints of acute shortness of breath. He was noted to be bradycardic and diaphoretic in the ED and work-up included a negative CTA chest, negative troponin, and an echo with preserved EF. He received one dose of sublingual NTG with almost complete relief of his symptoms. He was then transferred to [facility] for admission and cardiology consultation. Upon arrival, he was brought to the cath lab and was found to have no significant CAD. Afterwards a loop recorder was implanted. Because of his bradycardia, his beta-blocker was held. He is feeling well and offers no complaints. Denies chest pain, tightness, heaviness, and palpitations. Denies shortness of breath at rest, dyspnea on exertion, orthopnea and PND. Denies abdominal bloating. Denies lightheadedness, dizziness, and syncope. Denies lower extremity edema and claudication. Denies nausea, vomiting, diarrhea, melena, hematochezia. Denies hematuria, urgency, frequency. Denies fever, chills. PMH:  Past Medical History:   Diagnosis Date    Cardiac cath 09/16/2016    L dom. LM patent.   pLAD 70% FFR 0.74 (2.75 x 23-mm Xience ERIC). mD 95% (2.25 x 12-mm Resolute ERIC). Cx patent. RCA patent. LVEDP 4.  EF 60%. Mild anterior RWMA. RA 7.  PA 20/5. RV 19/1. W 9.  CO/CI:  6.2/3.2 (TD); 6.5/3.4 (Hugh).  Chest pain     Coronary artery disease     2 stents placed 9/16/2016    Hematuria     Hypertension     Solitary kidney        PSH:  Past Surgical History:   Procedure Laterality Date    CARDIAC CATHETERIZATION  9/16/2016         CORONARY ARTERY ANGIOGRAM  9/16/2016         CORONARY STENT SINGLE W/PTCA  9/16/2016         FRACTIONAL FLOW RESERVE  9/16/2016         HX APPENDECTOMY  1958    HX MOHS PROCEDURES Bilateral 1993    HX NEPHRECTOMY Left 2001    HX TONSILLECTOMY  1951    LV ANGIOGRAPHY  9/16/2016         FL INSERTION SUBQ CARDIAC RHYTHM MONITOR W/PRGRMG Left 8/3/2020    Loop Recorder Insert performed by Chemo Manriquez MD at [facility] CATH LAB    RT & LT HEART WITH C.O.  9/16/2016            MEDS:  Current Outpatient Medications   Medication Sig    losartan (COZAAR) 25 mg tablet Take 1 Tab by mouth daily.  gabapentin (NEURONTIN) 300 mg capsule Take 300 mg by mouth daily.  amLODIPine (NORVASC) 5 mg tablet Take 1 Tab by mouth two (2) times a day.  alfuzosin SR (UROXATRAL) 10 mg SR tablet TAKE 1 TABLET BY MOUTH ONCE DAILY AFTER SUPPER    atorvastatin (LIPITOR) 40 mg tablet Take 1 tablet by mouth once daily    clopidogreL (PLAVIX) 75 mg tab Take 1 tablet by mouth once daily    aspirin 81 mg chewable tablet Take 81 mg by mouth daily.  glucosamine 1,000 mg tab Take 1,500 mg by mouth daily.  loratadine (ALLERCLEAR) 10 mg tablet Take 10 mg by mouth daily.  docusate sodium (STOOL SOFTENER) 50 mg capsule Take 50 mg by mouth two (2) times a day.  multivitamin (ONE A DAY) tablet Take 1 Tab by mouth daily.  diclofenac EC (VOLTAREN) 75 mg EC tablet Take 75 mg by mouth two (2) times a day.  diclofenac (VOLTAREN) 1 % gel Apply 2 g to affected area two (2) times a day.      No current facility-administered medications for this visit. Allergies and Sensitivities:  No Known Allergies    Family History:  No family history on file. Social History:  He  reports that he has never smoked. He has never used smokeless tobacco.  He  reports no history of alcohol use. Review of Systems:  As above, otherwise 10 point review negative. Physical:  Visit Vitals  /72 (BP 1 Location: Left arm, BP Patient Position: Sitting)   Pulse 73   Ht 5' 7\" (1.702 m)   Wt 90.5 kg (199 lb 9.6 oz)   SpO2 98%   BMI 31.26 kg/m²         His weight is down 3 pounds      Exam:  Neck:  Supple, no JVD, no carotid bruits  CV:  Normal S1 and  S2, no murmurs, rubs, or gallops noted  Lungs:  Clear to ausculation throughout, no wheezes or rales  Abd:  Soft, non-tender, non-distended with good bowel sounds. No hepatosplenomegaly  Extremities:  Trace lower extremity edema      Data:  EKG:  Not done today      LABS:  Lab Results   Component Value Date/Time    Sodium 141 08/04/2020 01:39 AM    Potassium 4.3 08/04/2020 01:39 AM    Chloride 107 08/04/2020 01:39 AM    CO2 30 08/04/2020 01:39 AM    Glucose 94 08/04/2020 01:39 AM    BUN 33 (H) 08/04/2020 01:39 AM    Creatinine 0.93 08/04/2020 01:39 AM     Lab Results   Component Value Date/Time    Cholesterol, total 143 09/19/2016 05:09 AM    HDL Cholesterol 38 (L) 09/19/2016 05:09 AM    LDL, calculated 85 09/19/2016 05:09 AM    Triglyceride 100 09/19/2016 05:09 AM    CHOL/HDL Ratio 3.8 09/19/2016 05:09 AM     Lab Results   Component Value Date/Time    ALT (SGPT) 35 05/05/2017 05:50 PM         Impression/Plan:  1. Symptomatic bradycardia, s/p loop recorder implant, currently off of his beta blocker  2. CAD, s/p PCI/stent to the LAD in Sept 2016, recent cath showed no significant CAD  3. Essential hypertension, blood pressure much better controlled  4. Hyperlipidemia, on atorvastatin 40mg  5. Solitary kidney  6. Urinary self catheterization    Mr. Estrella was seen today for blood pressure follow-up after he was started on losartan 25mg daily, to optimize blood pressure control. He has tolerated the addition of this medication. He had a BMP done at a [facility] facility near his home in [place] and his renal function was normal (see Care Everywhere). Lab results were reviewed with him and his significant other. His systolic blood pressures at home have been between 117 and 139 mmHg. Diastolic pressures have been within 70 and 81 mmHg. This has improved since the losartan was added. At this time, no further adjustments will be made. He is going to begin riding his new tricycle for exercise. He was asked to continue his present regimen. He will follow-up with Dr. Sara Lacy as scheduled and as needed. Akila Gil MSN, FNP-BC      Please note:  Portions of this chart were created with Dragon medical speech to text program.  Unrecognized errors may be present.

## 2020-10-27 LAB
ANION GAP SERPL CALC-SCNC: 11.6 MMOL/L (ref 3–15)
BUN SERPL-MCNC: 15 MG/DL (ref 6–22)
CALCIUM SERPL-MCNC: 9.8 MG/DL (ref 8.4–10.5)
CHLORIDE SERPL-SCNC: 100 MMOL/L (ref 98–110)
CO2 SERPL-SCNC: 31 MMOL/L (ref 20–32)
CREAT SERPL-MCNC: 0.9 MG/DL (ref 0.8–1.6)
GFRAA, 66117: >60
GFRNA, 66118: >60
GLUCOSE SERPL-MCNC: 67 MG/DL (ref 70–99)
POTASSIUM SERPL-SCNC: 4 MMOL/L (ref 3.5–5.5)
SODIUM SERPL-SCNC: 142 MMOL/L (ref 133–145)

## 2020-10-28 ENCOUNTER — OFFICE VISIT (OUTPATIENT)
Dept: CARDIOLOGY CLINIC | Age: 70
End: 2020-10-28
Payer: MEDICARE

## 2020-10-28 VITALS
SYSTOLIC BLOOD PRESSURE: 130 MMHG | DIASTOLIC BLOOD PRESSURE: 72 MMHG | OXYGEN SATURATION: 98 % | HEIGHT: 67 IN | BODY MASS INDEX: 31.33 KG/M2 | HEART RATE: 73 BPM | WEIGHT: 199.6 LBS

## 2020-10-28 DIAGNOSIS — E78.5 DYSLIPIDEMIA: ICD-10-CM

## 2020-10-28 DIAGNOSIS — I25.10 CORONARY ARTERY DISEASE INVOLVING NATIVE CORONARY ARTERY OF NATIVE HEART WITHOUT ANGINA PECTORIS: ICD-10-CM

## 2020-10-28 DIAGNOSIS — I10 ESSENTIAL HYPERTENSION: Primary | ICD-10-CM

## 2020-10-28 PROCEDURE — G8417 CALC BMI ABV UP PARAM F/U: HCPCS | Performed by: NURSE PRACTITIONER

## 2020-10-28 PROCEDURE — G8432 DEP SCR NOT DOC, RNG: HCPCS | Performed by: NURSE PRACTITIONER

## 2020-10-28 PROCEDURE — 99213 OFFICE O/P EST LOW 20 MIN: CPT | Performed by: NURSE PRACTITIONER

## 2020-10-28 PROCEDURE — G8536 NO DOC ELDER MAL SCRN: HCPCS | Performed by: NURSE PRACTITIONER

## 2020-10-28 PROCEDURE — G8427 DOCREV CUR MEDS BY ELIG CLIN: HCPCS | Performed by: NURSE PRACTITIONER

## 2020-12-10 ENCOUNTER — OFFICE VISIT (OUTPATIENT)
Dept: CARDIOLOGY CLINIC | Age: 70
End: 2020-12-10
Payer: MEDICARE

## 2020-12-10 VITALS
OXYGEN SATURATION: 98 % | SYSTOLIC BLOOD PRESSURE: 122 MMHG | DIASTOLIC BLOOD PRESSURE: 76 MMHG | HEIGHT: 67 IN | BODY MASS INDEX: 31.39 KG/M2 | HEART RATE: 77 BPM | WEIGHT: 200 LBS

## 2020-12-10 DIAGNOSIS — I25.10 CORONARY ARTERY DISEASE INVOLVING NATIVE CORONARY ARTERY OF NATIVE HEART WITHOUT ANGINA PECTORIS: Primary | ICD-10-CM

## 2020-12-10 DIAGNOSIS — I10 ESSENTIAL HYPERTENSION: ICD-10-CM

## 2020-12-10 DIAGNOSIS — E78.5 DYSLIPIDEMIA: ICD-10-CM

## 2020-12-10 DIAGNOSIS — Z09 HOSPITAL DISCHARGE FOLLOW-UP: ICD-10-CM

## 2020-12-10 PROCEDURE — 93000 ELECTROCARDIOGRAM COMPLETE: CPT | Performed by: INTERNAL MEDICINE

## 2020-12-10 PROCEDURE — 99215 OFFICE O/P EST HI 40 MIN: CPT | Performed by: INTERNAL MEDICINE

## 2020-12-10 PROCEDURE — 3017F COLORECTAL CA SCREEN DOC REV: CPT | Performed by: INTERNAL MEDICINE

## 2020-12-10 PROCEDURE — G8417 CALC BMI ABV UP PARAM F/U: HCPCS | Performed by: INTERNAL MEDICINE

## 2020-12-10 PROCEDURE — 1101F PT FALLS ASSESS-DOCD LE1/YR: CPT | Performed by: INTERNAL MEDICINE

## 2020-12-10 PROCEDURE — G8432 DEP SCR NOT DOC, RNG: HCPCS | Performed by: INTERNAL MEDICINE

## 2020-12-10 PROCEDURE — G8536 NO DOC ELDER MAL SCRN: HCPCS | Performed by: INTERNAL MEDICINE

## 2020-12-10 PROCEDURE — G8427 DOCREV CUR MEDS BY ELIG CLIN: HCPCS | Performed by: INTERNAL MEDICINE

## 2020-12-10 RX ORDER — ATORVASTATIN CALCIUM 40 MG/1
TABLET, FILM COATED ORAL
Qty: 90 TAB | Refills: 3 | Status: SHIPPED | OUTPATIENT
Start: 2020-12-10 | End: 2021-12-06

## 2020-12-10 NOTE — PROGRESS NOTES
Rui Gaxiola presents today for   Chief Complaint   Patient presents with   [facility] Follow Up     after loop implant      Dizziness     sometimes    Leg Swelling     mild       Wiley Estrella preferred language for health care discussion is english/other. Is someone accompanying this pt? Wife     Is the patient using any DME equipment during [address]? no    Depression Screening:  3 most recent PHQ Screens 12/10/2020   Little interest or pleasure in doing things Not at all   Feeling down, depressed, irritable, or hopeless Not at all   Total Score PHQ 2 0       Learning Assessment:  Learning Assessment 12/3/2019   PRIMARY LEARNER Patient   PRIMARY LANGUAGE ENGLISH   LEARNER PREFERENCE PRIMARY DEMONSTRATION   ANSWERED BY Patient   RELATIONSHIP SELF       Abuse Screening:  Abuse Screening Questionnaire 12/3/2019   Do you ever feel afraid of your partner? N   Are you in a relationship with someone who physically or mentally threatens you? N   Is it safe for you to go home? Y       Fall Risk  Fall Risk Assessment, last 12 mths 12/10/2020   Able to walk? Yes   Fall in past 12 months? No       Pt currently taking Anticoagulant therapy? ASA 81mg every day     Coordination of Care:  1. Have you been to the ER, urgent care clinic since your last visit? Hospitalized since your last visit? 8/3 for cath and loop implant     2. Have you seen or consulted any other health care providers outside of the [address] [place] [address] since your last visit? Include any pap smears or colon screening.  no

## 2020-12-10 NOTE — PROGRESS NOTES
HISTORY OF PRESENT ILLNESS  Tyrese Schroeder is a 65 y.o. male. ASSESSMENT and PLAN    Mr. Tate Galloway has history of CAD. He had exertional chest pains noted back in the summer of 2016. [facility] echocardiogram and nuclear stress study at [facility] and was told that he had no significant CAD. Two weeks later, with accelerating exertional angina, he was seen by cardiology.  At that time, with his classic symptoms, he was scheduled for coronary angiography, which revealed significant, long LAD as well as diagonal disease. He underwent complex LAD diagonal stent procedure with resolution of his symptoms. He also has history of hematuria. He was recently diagnosed with bladder tumor; surgical biopsy is planned. There is concern for possible malignancy.  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  He also only has one kidney with normal kidney function.  His nuclear scan in March 2018 was normal.  His girlfriend was diagnosed with melanoma in 2018; she had this treated with clear margins.   His nuclear scan back in January 2019 showed low risk findings with normal perfusion and EF of 60%. His echocardiogram in March 2019 showed normal left atrial chamber size and function without significant valvular disease. In August 2020, he presented with increased shortness of breath, and bradycardia. His coronary angiography at that time showed normal EF 65%. No significant change from previous procedure of 2016. Circumflex is large and dominant, widely patent. Previous proximal LAD stent is widely patent. Moderate-sized diagonal branch is a smooth 50% stenosis without change. Because of his bradycardia, he also had a implantable loop recorder inserted, Linq.   Since that hospitalization, he has been seen by my nurse practitioner and has had other antihypertensive medication regimen added since his beta-blocker was discontinued after 36 years.      CAD:   Symptomatically stable.  BP:   Well controlled on current medication regimen.  Rhythm:   Remains in sinus. His Linq interrogation revealed episodes of SVT, with patient asymptomatic. He has occasional PVCs. He has not had any further episodes of significant bradycardia.  CHF:   Currently, there is no evidence of decompensated CHF noted.  Weight:    His weight today is 200 pounds. His baseline weight is 196 pounds. I encouraged him to try to get down 185 pounds.  Cholesterol:   Target LDL<70. Lipitor 40.  Anti-coagulation:   Remains on aspirin, and Plavix. He was again accompanied by his girlfriend. Because of worsening neuropathy, they both had numerous questions. All questions were answered. He has continued dizzy spells more consistent with vertigo. As noted above, his rhythm has been stable. He also had numerous questions about his sudden onset of ED after his beta-blocker was discontinued. I advised him that medications likely had not affected this and this is more consistent with just getting older. Over 30 minutes spent on answering questions and discussion alone. I will see him back in 6-9 months. Thank you. Encounter Diagnoses   Name Primary?  Coronary artery disease involving native coronary artery of native heart without angina pectoris Yes    Essential hypertension     Dyslipidemia      current treatment plan is effective, no change in therapy  lab results and schedule of future lab studies reviewed with patient  reviewed diet, exercise and weight control  cardiovascular risk and specific lipid/LDL goals reviewed  use of aspirin to prevent MI and TIA's discussed      HPI   Today, Mr. Yousif Mo has no complaints of chest pains, increased shortness of breath, or decreased exercise capacity. He has significant episodes of dizzy spells especially when he rolls over while laying down or looks up at the ceiling.   He also has dizzy spells when he first stands up consistent with orthostatic hypotension. He denies any episodes of palpitation prompting dizziness. He does have Linq implantable loop recorder in place. He has episodes of skipped beats. He denies any orthopnea or PND. He remains active physically. They have a 3 acre piece of land which he and his elderly girlfriend manage without difficulty. Review of Systems   Respiratory: Negative for shortness of breath. Cardiovascular: Negative for chest pain, palpitations, orthopnea, claudication, leg swelling and PND. All other systems reviewed and are negative. Physical Exam  Vitals signs and nursing note reviewed. Constitutional:       Appearance: Normal appearance. HENT:      Head: Normocephalic. Eyes:      Conjunctiva/sclera: Conjunctivae normal.   Neck:      Musculoskeletal: No neck rigidity. Cardiovascular:      Rate and Rhythm: Normal rate and regular rhythm. Pulmonary:      Breath sounds: Normal breath sounds. Abdominal:      Palpations: Abdomen is soft. Musculoskeletal:         General: No swelling. Skin:     General: Skin is warm and dry. Neurological:      General: No focal deficit present. Mental Status: He is alert and oriented to person, place, and time. Psychiatric:         Mood and Affect: Mood normal.         Behavior: Behavior normal.         PCP: Misha Ashton MD    Past Medical History:   Diagnosis Date    Cardiac cath 09/16/2016    L dom. LM patent. pLAD 70% FFR 0.74 (2.75 x 23-mm Xience ERIC). mD 95% (2.25 x 12-mm Resolute ERIC). Cx patent. RCA patent. LVEDP 4.  EF 60%. Mild anterior RWMA. RA 7.  PA 20/5. RV 19/1. W 9.  CO/CI:  6.2/3.2 (TD); 6.5/3.4 (Hugh).       Chest pain     Coronary artery disease     2 stents placed 9/16/2016    Hematuria     Hypertension     Solitary kidney        Past Surgical History:   Procedure Laterality Date    CARDIAC CATHETERIZATION  9/16/2016         CORONARY ARTERY ANGIOGRAM  9/16/2016         CORONARY STENT SINGLE W/PTCA  9/16/2016         FRACTIONAL FLOW RESERVE  9/16/2016         HX APPENDECTOMY  1958    HX MOHS PROCEDURES Bilateral 1993    HX NEPHRECTOMY Left 2001    HX TONSILLECTOMY  1951    LV ANGIOGRAPHY  9/16/2016         GA INSERTION SUBQ CARDIAC RHYTHM MONITOR W/PRGRMG Left 8/3/2020    Loop Recorder Insert performed by Chanel Ferro MD at [facility] CATH LAB    RT & LT HEART WITH C.O.  9/16/2016            Current Outpatient Medications   Medication Sig Dispense Refill    atorvastatin (LIPITOR) 40 mg tablet Take 1 tablet by mouth once daily 90 Tab 3    losartan (COZAAR) 25 mg tablet Take 1 Tab by mouth daily. 30 Tab 6    amLODIPine (NORVASC) 5 mg tablet Take 1 Tab by mouth two (2) times a day. 60 Tab 6    alfuzosin SR (UROXATRAL) 10 mg SR tablet TAKE 1 TABLET BY MOUTH ONCE DAILY AFTER SUPPER 90 Tab 1    clopidogreL (PLAVIX) 75 mg tab Take 1 tablet by mouth once daily 90 Tab 3    aspirin 81 mg chewable tablet Take 81 mg by mouth daily.  glucosamine 1,000 mg tab Take 1,500 mg by mouth daily.  loratadine (ALLERCLEAR) 10 mg tablet Take 10 mg by mouth daily.  docusate sodium (STOOL SOFTENER) 50 mg capsule Take 50 mg by mouth two (2) times a day.  multivitamin (ONE A DAY) tablet Take 1 Tab by mouth daily.  diclofenac EC (VOLTAREN) 75 mg EC tablet Take 75 mg by mouth two (2) times a day.  diclofenac (VOLTAREN) 1 % gel Apply 2 g to affected area two (2) times a day.          The patient has a family history of    Social History     Tobacco Use    Smoking status: Never Smoker    Smokeless tobacco: Never Used   Substance Use Topics    Alcohol use: No    Drug use: No       Lab Results   Component Value Date/Time    Cholesterol, total 143 09/19/2016 05:09 AM    HDL Cholesterol 38 (L) 09/19/2016 05:09 AM    LDL, calculated 85 09/19/2016 05:09 AM    Triglyceride 100 09/19/2016 05:09 AM    CHOL/HDL Ratio 3.8 09/19/2016 05:09 AM        BP Readings from Last 3 Encounters:   12/10/20 122/76   10/28/20 130/72   10/15/20 132/88        Pulse Readings from Last 3 Encounters:   12/10/20 77   10/28/20 73   10/15/20 73       Wt Readings from Last 3 Encounters:   12/10/20 90.7 kg (200 lb)   11/05/20 90.3 kg (199 lb)   10/28/20 90.5 kg (199 lb 9.6 oz)         EKG: normal EKG, normal sinus rhythm, unchanged from previous tracings.

## 2021-02-03 ENCOUNTER — OFFICE VISIT (OUTPATIENT)
Dept: CARDIOLOGY CLINIC | Age: 71
End: 2021-02-03
Payer: MEDICARE

## 2021-02-03 DIAGNOSIS — R00.1 SYMPTOMATIC BRADYCARDIA: ICD-10-CM

## 2021-02-03 DIAGNOSIS — Z95.9 CARDIAC DEVICE IN SITU: Primary | ICD-10-CM

## 2021-02-03 PROCEDURE — 93298 REM INTERROG DEV EVAL SCRMS: CPT | Performed by: INTERNAL MEDICINE

## 2021-02-03 NOTE — PROGRESS NOTES
Dr Mervin Moffett patient. Loop implant for syncope and bradycardia. BB was stopped at hospital due to luana and syncope in Aug 2020. Per Dr Mervin Moffett last note 12/10/2020 patient was still having episodes of dizziness. 3 tachy episodes longest was 44 sec at a rate of 176 bpm. No symptoms .  Unscheduled transmission alert

## 2021-02-11 NOTE — Clinical Note
Contrast Dose Calculator:   Patient's age: 68.   Patient's sex: Male. Patient weight (kg) = 89.4. Creatinine level (mg/dL) = 0.89. Creatinine clearance (mL/min): 96.26. Contrast concentration (mg/mL) = 300. MACD = 300 mL. Max Contrast dose per Creatinine Cl calculator = 216.59 mL. show

## 2021-02-26 RX ORDER — AMLODIPINE BESYLATE 5 MG/1
5 TABLET ORAL 2 TIMES DAILY
Qty: 180 TAB | Refills: 3 | Status: SHIPPED | OUTPATIENT
Start: 2021-02-26 | End: 2022-02-08

## 2021-02-26 RX ORDER — LOSARTAN POTASSIUM 25 MG/1
25 TABLET ORAL DAILY
Qty: 90 TAB | Refills: 3 | Status: SHIPPED | OUTPATIENT
Start: 2021-02-26 | End: 2021-07-30

## 2021-04-10 NOTE — PROGRESS NOTES
Dr Francisco Griffin patient, loop recorder, 3 tachy events longest was 9 minutes 30 sec.  No symptoms

## 2021-04-12 NOTE — PROGRESS NOTES
Device check personally reviewed by me. Normal device function on interrogation. See scanned interrogation document for complete details. Tachycardia episodes appear to be either a sustained atrial flutter with 2-1 conduction versus an SVT. Longest episode lasting for 9 minutes.   Will defer further management to primary cardiologist.

## 2021-04-21 NOTE — PROGRESS NOTES
I have personally seen and evaluated the device findings. Interrogation reviewed and I agree with assessment.     Migue Butts

## 2021-04-21 NOTE — PROGRESS NOTES
[place] patient, one episode in the tachy zone, lasting one minute 20 sec. With some PVC's , no symptoms and no other episodes.

## 2021-05-05 NOTE — PROGRESS NOTES
Device check personally reviewed by me. Episodes reported as A. fib appeared to be more likely APCs and VPCs. See scanned interrogation document for complete details.

## 2021-05-05 NOTE — PROGRESS NOTES
Several tachy episodes , longest lasting 19 minutes. One symptom event , looks like PVC's. 4 episodes in the AF zone but looks like lots of PVC's. Dr Nat Blake patient.

## 2021-05-13 NOTE — PROGRESS NOTES
Device check personally reviewed by me. Event noted. Given short duration would continue to monitor. See scanned interrogation document for complete details.

## 2021-05-19 ENCOUNTER — OFFICE VISIT (OUTPATIENT)
Dept: CARDIOLOGY CLINIC | Age: 71
End: 2021-05-19
Payer: MEDICARE

## 2021-05-19 DIAGNOSIS — R00.1 SYMPTOMATIC BRADYCARDIA: ICD-10-CM

## 2021-05-19 DIAGNOSIS — Z95.9 CARDIAC DEVICE IN SITU: Primary | ICD-10-CM

## 2021-05-19 PROCEDURE — 93298 REM INTERROG DEV EVAL SCRMS: CPT | Performed by: INTERNAL MEDICINE

## 2021-05-24 NOTE — PROGRESS NOTES
Device check personally reviewed by me. Events noted. Asymptomatic would continue to monitor. Normal device function on interrogation. See scanned interrogation document for complete details.

## 2021-05-24 NOTE — PROGRESS NOTES
Dr Nel Saxena patient. 2 tachy events, longest lasting 11 minutes 25 sec at a rate of 167 average. No other events and no symptoms.

## 2021-06-08 ENCOUNTER — OFFICE VISIT (OUTPATIENT)
Dept: CARDIOLOGY CLINIC | Age: 71
End: 2021-06-08
Payer: MEDICARE

## 2021-06-08 VITALS
HEIGHT: 67 IN | DIASTOLIC BLOOD PRESSURE: 80 MMHG | OXYGEN SATURATION: 97 % | SYSTOLIC BLOOD PRESSURE: 142 MMHG | HEART RATE: 82 BPM | BODY MASS INDEX: 32.15 KG/M2 | WEIGHT: 204.8 LBS

## 2021-06-08 DIAGNOSIS — I25.10 CORONARY ARTERY DISEASE INVOLVING NATIVE CORONARY ARTERY OF NATIVE HEART WITHOUT ANGINA PECTORIS: Primary | ICD-10-CM

## 2021-06-08 PROCEDURE — G8417 CALC BMI ABV UP PARAM F/U: HCPCS | Performed by: INTERNAL MEDICINE

## 2021-06-08 PROCEDURE — 99214 OFFICE O/P EST MOD 30 MIN: CPT | Performed by: INTERNAL MEDICINE

## 2021-06-08 PROCEDURE — 1101F PT FALLS ASSESS-DOCD LE1/YR: CPT | Performed by: INTERNAL MEDICINE

## 2021-06-08 PROCEDURE — 3017F COLORECTAL CA SCREEN DOC REV: CPT | Performed by: INTERNAL MEDICINE

## 2021-06-08 PROCEDURE — G8427 DOCREV CUR MEDS BY ELIG CLIN: HCPCS | Performed by: INTERNAL MEDICINE

## 2021-06-08 PROCEDURE — G8510 SCR DEP NEG, NO PLAN REQD: HCPCS | Performed by: INTERNAL MEDICINE

## 2021-06-08 PROCEDURE — G8536 NO DOC ELDER MAL SCRN: HCPCS | Performed by: INTERNAL MEDICINE

## 2021-06-08 PROCEDURE — 93000 ELECTROCARDIOGRAM COMPLETE: CPT | Performed by: INTERNAL MEDICINE

## 2021-06-08 RX ORDER — MECLIZINE HYDROCHLORIDE 25 MG/1
TABLET ORAL
COMMUNITY
Start: 2021-05-10

## 2021-06-08 NOTE — PROGRESS NOTES
HISTORY OF PRESENT ILLNESS  Luke Stevens is a 65 y.o. male. ASSESSMENT and PLAN    Mr. Pamela Fitch has history of CAD. He had exertional chest pains noted back in the summer of 2016. Maria M Higuera had echocardiogram and nuclear stress study at [facility] and was told that he had no significant CAD. Two weeks later, with accelerating exertional angina, he was seen by cardiology.  At that time, with his classic symptoms, he was scheduled for coronary angiography, which revealed significant, long LAD as well as diagonal disease. He underwent complex LAD diagonal stent procedure with resolution of his symptoms. He also has history of hematuria. He was recently diagnosed with bladder tumor; surgical biopsy is planned. There is concern for possible malignancy.  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  He also only has one kidney with normal kidney function.  His nuclear scan in March 2018 was normal.  His girlfriend was diagnosed with melanoma in 2018; she had this treated with clear margins.   His nuclear scan back in January 2019 showed low risk findings with normal perfusion and EF of 60%.  His echocardiogram in March 2019 showed normal left atrial chamber size and function without significant valvular disease. In August 2020, he presented with increased shortness of breath, and bradycardia. His coronary angiography at that time showed normal EF 65%. No significant change from previous procedure of 2016. Circumflex is large and dominant, widely patent. Previous proximal LAD stent is widely patent. Moderate-sized diagonal branch is a smooth 50% stenosis without change. Because of his bradycardia, he also had a implantable loop recorder inserted, Linq.   Since that hospitalization, he has been seen by my nurse practitioner and has had other antihypertensive medication regimen added since his beta-blocker was discontinued after 40 years.     · CAD:    Clinically stable. · BP:    Upper normal but acceptable. · Rhythm:    Stable sinus. · CHF:    There is no evidence of decompensated CHF noted. · Weight:     His weight today is 205 pounds. His baseline weight is 196 pounds. He needs to lose at least 10 pounds. · Cholesterol:   Target LDL <70. Lipitor 40. · Anti-platelet:   Remains on ASA, and Plavix. He was again accompanied by his girlfriend. He has continued dizzy spells more consistent with vertigo. He has also noted episodes of orthostasis. This was explained to him at length. I will see him back in 6 months. Thank you. Encounter Diagnoses   Name Primary?  Coronary artery disease involving native coronary artery of native heart without angina pectoris Yes     current treatment plan is effective, no change in therapy  lab results and schedule of future lab studies reviewed with patient  reviewed diet, exercise and weight control  cardiovascular risk and specific lipid/LDL goals reviewed  use of aspirin to prevent MI and TIA's discussed      HPI   Today, Mr. Marquis Fuentes is no complaints of chest pains, increased shortness of breath, or decreased exercise capacity. He and his girlfriend have been building cabinets in the garage which is vigorous and physically demanding. He denies any complaints of chest pains with exertion. He denies any changes in his exercise capacity. He denies orthopnea or PND. He denies palpitations or dizziness. Review of Systems   Respiratory: Negative for shortness of breath. Cardiovascular: Negative for chest pain, palpitations, orthopnea, claudication, leg swelling and PND. All other systems reviewed and are negative. Physical Exam  Vitals and nursing note reviewed. Constitutional:       Appearance: Normal appearance. HENT:      Head: Normocephalic.    Eyes:      Conjunctiva/sclera: Conjunctivae normal.   Cardiovascular:      Rate and Rhythm: Normal rate and regular rhythm. Pulmonary:      Breath sounds: Normal breath sounds. Abdominal:      Palpations: Abdomen is soft. Musculoskeletal:         General: No swelling. Cervical back: No rigidity. Skin:     General: Skin is warm and dry. Neurological:      General: No focal deficit present. Mental Status: He is alert and oriented to person, place, and time. Psychiatric:         Mood and Affect: Mood normal.         Behavior: Behavior normal.         PCP: Luis Alfredo Bowser MD    Past Medical History:   Diagnosis Date    Cardiac cath 09/16/2016    L dom. LM patent. pLAD 70% FFR 0.74 (2.75 x 23-mm Xience ERIC). mD 95% (2.25 x 12-mm Resolute ERIC). Cx patent. RCA patent. LVEDP 4.  EF 60%. Mild anterior RWMA. RA 7.  PA 20/5. RV 19/1. W 9.  CO/CI:  6.2/3.2 (TD); 6.5/3.4 (Hugh).  Chest pain     Coronary artery disease     2 stents placed 9/16/2016    Hematuria     Hypertension     Solitary kidney        Past Surgical History:   Procedure Laterality Date    CARDIAC CATHETERIZATION  9/16/2016         CORONARY ARTERY ANGIOGRAM  9/16/2016         CORONARY STENT SINGLE W/PTCA  9/16/2016         FRACTIONAL FLOW RESERVE  9/16/2016         HX APPENDECTOMY  1958    HX MOHS PROCEDURES Bilateral 1993    HX NEPHRECTOMY Left 2001    HX TONSILLECTOMY  1951    LV ANGIOGRAPHY  9/16/2016         PA INSERTION SUBQ CARDIAC RHYTHM MONITOR W/PRGRMG Left 8/3/2020    Loop Recorder Insert performed by Paul Chaudhari MD at [facility] CATH LAB    RT & LT HEART WITH C.O.  9/16/2016            Current Outpatient Medications   Medication Sig Dispense Refill    alfuzosin SR (UROXATRAL) 10 mg SR tablet TAKE 1 TABLET BY MOUTH ONCE DAILY AFTER SUPPER 90 Tab 2    clopidogreL (PLAVIX) 75 mg tab Take 1 tablet by mouth once daily 90 Tab 3    losartan (COZAAR) 25 mg tablet Take 1 Tab by mouth daily. 90 Tab 3    amLODIPine (NORVASC) 5 mg tablet Take 1 Tab by mouth two (2) times a day.  180 Tab 3    atorvastatin (LIPITOR) 40 mg tablet Take 1 tablet by mouth once daily 90 Tab 3    aspirin 81 mg chewable tablet Take 81 mg by mouth daily.  glucosamine 1,000 mg tab Take 1,500 mg by mouth daily.  loratadine (ALLERCLEAR) 10 mg tablet Take 10 mg by mouth daily.  docusate sodium (STOOL SOFTENER) 50 mg capsule Take 50 mg by mouth two (2) times a day.  multivitamin (ONE A DAY) tablet Take 1 Tab by mouth daily.  diclofenac EC (VOLTAREN) 75 mg EC tablet Take 75 mg by mouth two (2) times a day.  diclofenac (VOLTAREN) 1 % gel Apply 2 g to affected area two (2) times a day.  meclizine (ANTIVERT) 25 mg tablet TAKE 1 TABLET BY MOUTH AT BEDTIME AND MAY TAKE 1 EVERY 8 HOURS AS NEEDED         The patient has a family history of    Social History     Tobacco Use    Smoking status: Never Smoker    Smokeless tobacco: Never Used   Substance Use Topics    Alcohol use: No    Drug use: No       Lab Results   Component Value Date/Time    Cholesterol, total 143 09/19/2016 05:09 AM    HDL Cholesterol 38 (L) 09/19/2016 05:09 AM    LDL, calculated 85 09/19/2016 05:09 AM    Triglyceride 100 09/19/2016 05:09 AM    CHOL/HDL Ratio 3.8 09/19/2016 05:09 AM        BP Readings from Last 3 Encounters:   06/08/21 (!) 142/80   12/10/20 122/76   10/28/20 130/72        Pulse Readings from Last 3 Encounters:   06/08/21 82   12/10/20 77   10/28/20 73       Wt Readings from Last 3 Encounters:   06/08/21 92.9 kg (204 lb 12.8 oz)   12/10/20 90.7 kg (200 lb)   11/05/20 90.3 kg (199 lb)         EKG: unchanged from previous tracings, normal sinus rhythm, single PAC with voltage criteria for LVH.

## 2021-06-08 NOTE — PROGRESS NOTES
Kita Krusecher presents today for   Chief Complaint   Patient presents with    Follow-up     6 month follow up        Kita Pickard preferred language for health care discussion is english/other. Is someone accompanying this pt? Yes, wife    Is the patient using any DME equipment during [address]? no    Depression Screening:  3 most recent PHQ Screens 6/8/2021   Little interest or pleasure in doing things Not at all   Feeling down, depressed, irritable, or hopeless Not at all   Total Score PHQ 2 0       Learning Assessment:  Learning Assessment 12/3/2019   PRIMARY LEARNER Patient   PRIMARY LANGUAGE ENGLISH   LEARNER PREFERENCE PRIMARY DEMONSTRATION   ANSWERED BY Patient   RELATIONSHIP SELF       Abuse Screening:  Abuse Screening Questionnaire 6/8/2021   Do you ever feel afraid of your partner? N   Are you in a relationship with someone who physically or mentally threatens you? N   Is it safe for you to go home? Y       Fall Risk  Fall Risk Assessment, last 12 mths 6/8/2021   Able to walk? Yes   Fall in past 12 months? 0   Do you feel unsteady? 0   Are you worried about falling 0       Pt currently taking Anticoagulant therapy? no    Coordination of Care:  1. Have you been to the ER, urgent care clinic since your last visit? Hospitalized since your last visit? no    2. Have you seen or consulted any other health care providers outside of the [address] [place] [address] since your last visit? Include any pap smears or colon screening.  no

## 2021-06-23 NOTE — PROGRESS NOTES
5 tachy events, no symptoms. Recently seen by Dr Petra Velazquez [phone] no changes.  Longest was 1 minute 23 sec at rate of 162bpm.

## 2021-06-24 NOTE — PROGRESS NOTES
[place] patient . Tachy event on 6-, lasting 7 minutes 34 sec at a rate of 207 bpm. No symptoms , unscheduled transmission.

## 2021-06-25 ENCOUNTER — TELEPHONE (OUTPATIENT)
Dept: CARDIOLOGY CLINIC | Age: 71
End: 2021-06-25

## 2021-06-25 NOTE — PROGRESS NOTES
Event noted. Possibly short episode of sustained atrial fibrillation. See scanned interrogation document for complete details.

## 2021-06-25 NOTE — PROGRESS NOTES
Lorena Doshi presents today for discussion regarding anticoagulation due to atrial fibrillation as noted during a Loop recorder check. His only complaint is of some fatigue which is unusual for him according to his significant other. A loop recorder event report showed that he had a tachycardic episode on 6/30/21 with maximum heart rate of 182 bpm. His ventricular rate histogram showed elevated heart rates     He is a 68year old male with history of CAD (s/p PCI/stent to the LAD in 9/2016), solitary kidney, HTN, hyperlipidemia, recurrent hematuria with urinary self-catheterization, and chronic NSAID use. He was last seen by Dr. Maxine Mcdonnell in Dec. 2019. His last pharmacologic nuclear stress test was done in Jan. 20120 and it showed a fixed defect consistent with prior myocardial infarction. Myocardial perfusion imaging supports a low risk stress test.  His last echo was done in March 2019 and it showed an EF of 56-60%, no regional WMA and age-appropriate left ventricular diastolic function. He presented to the hospital on 8/3/20 with complaints of acute shortness of breath. He was noted to be bradycardic and diaphoretic in the ED and work-up included a negative CTA chest, negative troponin, and an echo with preserved EF. He received one dose of sublingual NTG with almost complete relief of his symptoms. He was then transferred to [facility] for admission and cardiology consultation. Upon arrival, he was brought to the cath lab and was found to have no significant CAD. Afterwards a loop recorder was placed. Because of his bradycardia, his beta-blocker was held. He is feeling well except for some fatigue. Denies chest pain, tightness, heaviness, and palpitations. Denies shortness of breath at rest, dyspnea on exertion, orthopnea and PND. Denies abdominal bloating. Denies lightheadedness, dizziness, and syncope. Denies lower extremity edema and claudication.   Denies nausea, vomiting, diarrhea, melena, hematochezia. Denies hematuria, urgency, frequency. Denies fever, chills. PMH:  Past Medical History:   Diagnosis Date    Cardiac cath 09/16/2016    L dom. LM patent. pLAD 70% FFR 0.74 (2.75 x 23-mm Xience ERIC). mD 95% (2.25 x 12-mm Resolute ERIC). Cx patent. RCA patent. LVEDP 4.  EF 60%. Mild anterior RWMA. RA 7.  PA 20/5. RV 19/1. W 9.  CO/CI:  6.2/3.2 (TD); 6.5/3.4 (Hugh).  Chest pain     Coronary artery disease     2 stents placed 9/16/2016    Hematuria     Hypertension     Solitary kidney        PSH:  Past Surgical History:   Procedure Laterality Date    CARDIAC CATHETERIZATION  9/16/2016         CORONARY ARTERY ANGIOGRAM  9/16/2016         CORONARY STENT SINGLE W/PTCA  9/16/2016         FRACTIONAL FLOW RESERVE  9/16/2016         HX APPENDECTOMY  1958    HX MOHS PROCEDURES Bilateral 1993    HX NEPHRECTOMY Left 2001    HX TONSILLECTOMY  1951    LV ANGIOGRAPHY  9/16/2016         WI INSERTION SUBQ CARDIAC RHYTHM MONITOR W/PRGRMG Left 8/3/2020    Loop Recorder Insert performed by Shoshana Hester MD at [facility] CATH LAB    RT & LT HEART WITH C.O.  9/16/2016            MEDS:  Current Outpatient Medications   Medication Sig    meclizine (ANTIVERT) 25 mg tablet TAKE 1 TABLET BY MOUTH AT BEDTIME AND MAY TAKE 1 EVERY 8 HOURS AS NEEDED    alfuzosin SR (UROXATRAL) 10 mg SR tablet TAKE 1 TABLET BY MOUTH ONCE DAILY AFTER SUPPER    clopidogreL (PLAVIX) 75 mg tab Take 1 tablet by mouth once daily    losartan (COZAAR) 25 mg tablet Take 1 Tab by mouth daily.  amLODIPine (NORVASC) 5 mg tablet Take 1 Tab by mouth two (2) times a day.  atorvastatin (LIPITOR) 40 mg tablet Take 1 tablet by mouth once daily    aspirin 81 mg chewable tablet Take 81 mg by mouth daily.  glucosamine 1,000 mg tab Take 1,500 mg by mouth daily.  loratadine (ALLERCLEAR) 10 mg tablet Take 10 mg by mouth daily.  docusate sodium (STOOL SOFTENER) 50 mg capsule Take 50 mg by mouth two (2) times a day.     multivitamin (ONE A DAY) tablet Take 1 Tab by mouth daily.  diclofenac EC (VOLTAREN) 75 mg EC tablet Take 75 mg by mouth two (2) times a day.  diclofenac (VOLTAREN) 1 % gel Apply 2 g to affected area two (2) times a day. No current facility-administered medications for this visit. Allergies and Sensitivities:  No Known Allergies    Family History:  No family history on file. Social History:  He  reports that he has never smoked. He has never used smokeless tobacco.  He  reports no history of alcohol use. Physical:  Visit Vitals  BP (!) 140/80   Pulse 100   Ht 5' 7\" (1.702 m)   SpO2 97%   BMI 32.08 kg/m²     His weight is up 1 pound      Exam:  Neck:  Supple, no JVD, no carotid bruits  CV:  Normal S1 and  S2, no murmurs, rubs, or gallops noted. Irregularly, irregular rhythm  Lungs:  Clear to ausculation throughout, no wheezes or rales  Abd:  Soft, non-tender, non-distended with good bowel sounds. No hepatosplenomegaly  Extremities:  Trace lower extremity edema      Data:  EKG:  Not done today      LABS:  Lab Results   Component Value Date/Time    Sodium 142 10/27/2020 01:55 PM    Potassium 4.0 10/27/2020 01:55 PM    Chloride 100 10/27/2020 01:55 PM    CO2 31 10/27/2020 01:55 PM    Glucose 67 (L) 10/27/2020 01:55 PM    BUN 15 10/27/2020 01:55 PM    Creatinine 0.9 10/27/2020 01:55 PM     Lab Results   Component Value Date/Time    Cholesterol, total 143 09/19/2016 05:09 AM    HDL Cholesterol 38 (L) 09/19/2016 05:09 AM    LDL, calculated 85 09/19/2016 05:09 AM    Triglyceride 100 09/19/2016 05:09 AM    CHOL/HDL Ratio 3.8 09/19/2016 05:09 AM     Lab Results   Component Value Date/Time    ALT (SGPT) 35 05/05/2017 05:50 PM         Impression/Plan:  1. Atrial fibrillation with RVR according to loop recorder event report  2. CAD, s/p PCI/stent to the LAD in Sept 2016, recent cath showed no significant CAD  3. Essential hypertension, blood pressure controlled  4.   Hyperlipidemia, on atorvastatin 40mg  5. Solitary kidney  6. Urinary self catheterization  7. History of ymptomatic bradycardia, s/p loop recorder implant, currently off of his beta blocker    Mr. Estrella was seen today to discuss initiation of a beta blocker in view of atrial fibrillation noted on an event report from his loop recorder. An episode detected on 6/30/21 showed a 40 second episode with heart rate up to 182 bpm.  He had another episode prior to this on 6/16/21 which lasted 7 minutes and 34 seconds with heart rate of 207 bpm.    Today, he states that he feels fairly well but has noticed some fatigue, which is unusual for him. He is currently not on any rate controlling medications. In 2020, his Toprol XL was discontinued due to symptomatic bradycardia. He will be started on metoprolol tartrate 25mg BID. I asked that he transmit a recording tomorrow around mid-day, a few hours after taking his morning dose of metoprolol. Teaching done today re:  Atrial fibrillation and anticoagulation. Options for anticoagulation include Coumadin and DOACs. Risks and benefits of anticoagulation were discussed (increased risk for bleeding and bruising). Options discussed and he chooses to take Eliquis. He will be started on Eliquis 5mg BID. He is also on Plavix and ASA which he is to continue taking until I confirm with Dr. Makenzie Perez whether or not to discontinue his Plavix. Plavix will likely be discontinued. ** Addendum:  7/2/21:  Spoke with Dr. Makenzie Perez. Okay to discontinue Plavix but continue ASA. Mr. Jonathan Webb was made aware. **    Patient education material re:  AFIB, anticoagulation attached to his after visit summary. He was also given a patient education booklet regarding AFIB. I also briefly discussed the purpose of a pacemaker if he has bradycardia while being on the beta blocker and tachycardia if he is not on a rate controlling medication.   Greater than 50% of a 35 minute visit was spent in discussion, counseling, and answering questions. He will follow-up with Dr. Blaise Hui as scheduled and as needed. Yobani Alvarez MSN, FNP-BC    Please note:  Portions of this chart were created with Dragon medical speech to text program.  Unrecognized errors may be present.

## 2021-06-25 NOTE — TELEPHONE ENCOUNTER
----- Message from [id number] Pari Sandoval MD sent at 6/25/2021  8:54 AM EDT -----  Can you have the patient be seen by Arianna Castro to discuss possible oral anticoagulation for his atrial fibrillation noted on his implantable loop recorder?  ----- Message -----  From: Adelaide Pa MD  Sent: 6/25/2021   8:13 AM EDT  To: [id number] Pari Sandoval MD, Clif Burns RN    Event looks like it may have been a short episode of rapid atrial fibrillation. Patient may need a follow-up visit.  ----- Message -----  From: Kevin Pardo RN  Sent: 6/24/2021  10:54 AM EDT  To: Kelli Richard MD    [place] patient . Tachy event on 6-, lasting 7 minutes 34 sec at a rate of 207 bpm. No symptoms , unscheduled transmission.

## 2021-07-01 ENCOUNTER — OFFICE VISIT (OUTPATIENT)
Dept: CARDIOLOGY CLINIC | Age: 71
End: 2021-07-01
Payer: MEDICARE

## 2021-07-01 VITALS
HEART RATE: 100 BPM | SYSTOLIC BLOOD PRESSURE: 140 MMHG | BODY MASS INDEX: 32.08 KG/M2 | DIASTOLIC BLOOD PRESSURE: 80 MMHG | HEIGHT: 67 IN | OXYGEN SATURATION: 97 %

## 2021-07-01 DIAGNOSIS — E78.5 DYSLIPIDEMIA: ICD-10-CM

## 2021-07-01 DIAGNOSIS — I48.91 ATRIAL FIBRILLATION, UNSPECIFIED TYPE (HCC): Primary | ICD-10-CM

## 2021-07-01 DIAGNOSIS — I10 ESSENTIAL HYPERTENSION: ICD-10-CM

## 2021-07-01 DIAGNOSIS — I25.10 CORONARY ARTERY DISEASE INVOLVING NATIVE CORONARY ARTERY OF NATIVE HEART WITHOUT ANGINA PECTORIS: ICD-10-CM

## 2021-07-01 PROCEDURE — G8427 DOCREV CUR MEDS BY ELIG CLIN: HCPCS | Performed by: NURSE PRACTITIONER

## 2021-07-01 PROCEDURE — G8417 CALC BMI ABV UP PARAM F/U: HCPCS | Performed by: NURSE PRACTITIONER

## 2021-07-01 PROCEDURE — G8432 DEP SCR NOT DOC, RNG: HCPCS | Performed by: NURSE PRACTITIONER

## 2021-07-01 PROCEDURE — 3017F COLORECTAL CA SCREEN DOC REV: CPT | Performed by: NURSE PRACTITIONER

## 2021-07-01 PROCEDURE — 99214 OFFICE O/P EST MOD 30 MIN: CPT | Performed by: NURSE PRACTITIONER

## 2021-07-01 PROCEDURE — 1101F PT FALLS ASSESS-DOCD LE1/YR: CPT | Performed by: NURSE PRACTITIONER

## 2021-07-01 PROCEDURE — G8536 NO DOC ELDER MAL SCRN: HCPCS | Performed by: NURSE PRACTITIONER

## 2021-07-01 RX ORDER — METOPROLOL TARTRATE 25 MG/1
25 TABLET, FILM COATED ORAL 2 TIMES DAILY
Qty: 60 TABLET | Refills: 2 | Status: SHIPPED | OUTPATIENT
Start: 2021-07-01 | End: 2021-09-24

## 2021-07-01 NOTE — PATIENT INSTRUCTIONS
Begin Eliquis 5mg twice a day (blood thinner)  Begin metoprolol tartrate 25mg twice a day (affects heart rate and blood pressure)  Transmit a recording from the Loop recorder mid-day tomorrow after a dose of metoprolol is taken  Continue Plavix and Aspirin until you hear back from us with instructions  Follow-up with Dr. Jacque Nevarez as scheduled and as needed      Atrial Fibrillation: Care Instructions  Your Care Instructions     Atrial fibrillation is an irregular and often fast heartbeat. Treating this condition is important for several reasons. It can cause blood clots, which can travel from your heart to your brain and cause a stroke. If you have a fast heartbeat, you may feel lightheaded, dizzy, and weak. An irregular heartbeat can also increase your risk for heart failure. Atrial fibrillation is often the result of another heart condition, such as high blood pressure or coronary artery disease. Making changes to improve your heart condition will help you stay healthy and active. Follow-up care is a key part of your treatment and safety. Be sure to make and go to all appointments, and call your doctor if you are having problems. It's also a good idea to know your test results and keep a list of the medicines you take. How can you care for yourself at home? Medicines    · Take your medicines exactly as prescribed. Call your doctor if you think you are having a problem with your medicine. You will get more details on the specific medicines your doctor prescribes.     · If your doctor has given you a blood thinner to prevent a stroke, be sure you get instructions about how to take your medicine safely. Blood thinners can cause serious bleeding problems.     · Do not take any vitamins, over-the-counter drugs, or herbal products without talking to your doctor first.   Lifestyle changes    · Do not smoke. Smoking can increase your chance of a stroke and heart attack.  If you need help quitting, talk to your doctor about stop-smoking programs and medicines. These can increase your chances of quitting for good.     · Eat a heart-healthy diet.     · Stay at a healthy weight. Lose weight if you need to.     · Limit alcohol to 2 drinks a day for men and 1 drink a day for women. Too much alcohol can cause health problems.     · Avoid colds and flu. Get a pneumococcal vaccine shot. If you have had one before, ask your doctor whether you need another dose. Get a flu shot every year. If you must be around people with colds or flu, wash your hands often. Activity    · If your doctor recommends it, get more exercise. Walking is a good choice. Bit by bit, increase the amount you walk every day. Try for at least 30 minutes on most days of the week. You also may want to swim, bike, or do other activities. Your doctor may suggest that you join a cardiac rehabilitation program so that you can have help increasing your physical activity safely.     · Start light exercise if your doctor says it is okay. Even a small amount will help you get stronger, have more energy, and manage stress. Walking is an easy way to get exercise. Start out by walking a little more than you did in the hospital. Gradually increase the amount you walk.     · When you exercise, watch for signs that your heart is working too hard. You are pushing too hard if you cannot talk while you are exercising. If you become short of breath or dizzy or have chest pain, sit down and rest immediately.     · Check your pulse regularly. Place two fingers on the artery at the palm side of your wrist, in line with your thumb. If your heartbeat seems uneven or fast, talk to your doctor. When should you call for help? Call 911 anytime you think you may need emergency care. For example, call if:    · You have symptoms of a heart attack. These may include:  ? Chest pain or pressure, or a strange feeling in the chest.  ? Sweating. ? Shortness of breath. ? Nausea or vomiting.   ? Pain, pressure, or a strange feeling in the back, neck, jaw, or upper belly or in one or both shoulders or arms. ? Lightheadedness or sudden weakness. ? A fast or irregular heartbeat. After you call 911, the  may tell you to chew 1 adult-strength or 2 to 4 low-dose aspirin. Wait for an ambulance. Do not try to drive yourself.     · You have symptoms of a stroke. These may include:  ? Sudden numbness, tingling, weakness, or loss of movement in your face, arm, or leg, especially on only one side of your body. ? Sudden vision changes. ? Sudden trouble speaking. ? Sudden confusion or trouble understanding simple statements. ? Sudden problems with walking or balance. ? A sudden, severe headache that is different from past headaches.     · You passed out (lost consciousness). Call your doctor now or seek immediate medical care if:    · You have new or increased shortness of breath.     · You feel dizzy or lightheaded, or you feel like you may faint.     · Your heart rate becomes irregular.     · You can feel your heart flutter in your chest or skip heartbeats. Tell your doctor if these symptoms are new or worse. Watch closely for changes in your health, and be sure to contact your doctor if you have any problems. Where can you learn more? Go to http://www.gray.com/  Enter U020 in the search box to learn more about \"Atrial Fibrillation: Care Instructions. \"  Current as of: August 31, 2020               Content Version: 12.8  © 2006-2021 [employer]. Care instructions adapted under license by [employer] (which disclaims liability or warranty for this information). If you have questions about a medical condition or this instruction, always ask your healthcare professional. [place] [address] any warranty or liability for your use of this information.          Taking Direct Oral Anticoagulants Safely: Care Instructions  Your Care Instructions Direct oral anticoagulants (DOACs) are medicines that help prevent blood clots. They also help treat problems caused by blood clots. These medicines are also called blood thinners. Blood thinners don't really thin the blood. They slow down the time it takes for a blood clot to form. They also keep existing blood clots from getting bigger. Blood thinners can help prevent a stroke caused by a heart rhythm problem (atrial fibrillation). This heart rhythm problem can form clots in the heart that can then go to the brain. Blood thinners can also help prevent or treat blood clots in the legs or lungs. Examples of DOACs include:  · Apixaban (Eliquis). · Dabigatran (Pradaxa). · Edoxaban (Savaysa). · Rivaroxaban (Xarelto). Blood thinners can help save lives. But they can also cause problems. They can make you more likely to bleed. It's important to take them right and do everything you can to keep yourself safe. Follow-up care is a key part of your treatment and safety. Be sure to make and go to all appointments, and call your doctor if you are having problems. It's also a good idea to know your test results and keep a list of the medicines you take. How can you care for yourself at home? · Take your anticoagulant (blood thinner) exactly as your doctor prescribed them. · If you miss a dose, don't take an extra dose to make up for it. Your doctor can tell you exactly what to do so you don't take too much or too little. · Ask your pharmacist if you should take your blood thinner with food or without food. · Take your blood thinner at the same time every day. Be careful not to run out of them. · Ask your pharmacist how to store your blood thinner. · Don't take other medicines before talking to your doctor or pharmacist first. This includes prescription medicines, over-the-counter medicines, vitamins, and herbal products. It also includes aspirin and other pain relievers, such as ibuprofen (Motrin).   · Tell your doctors, dentist, and pharmacist that you are taking a blood thinner. · Wear medical alert jewelry. This lets others know that you take a blood thinner. You can buy it at most drugstores. · If you are pregnant, breastfeeding, or trying to get pregnant, tell your doctor. You and your doctor will decide what medicines are safe. If you are not planning on getting pregnant, talk to your doctor about how you can prevent pregnancy. · Try to avoid injuries. For example, be careful when you are exercising or playing sports. Make your home safe to reduce your risk of falling. When should you call for help? Call 911 anytime you think you may need emergency care. For example, call if:    · You have a sudden, severe headache that is different from past headaches. Call your doctor now or seek immediate medical care if:    · You have any abnormal bleeding, such as:  ? Nosebleeds. ? Vaginal bleeding that is different (heavier, more frequent, at a different time of the month) than what you are used to.  ? Bloody or black stools, or rectal bleeding. ? Bloody or pink urine. Watch closely for changes in your health, and be sure to contact your doctor if you have any problems. Where can you learn more? Go to http://www.gray.com/  Enter V613 in the search box to learn more about \"Taking Direct Oral Anticoagulants Safely: Care Instructions. \"  Current as of: August 31, 2020               Content Version: 12.8  © 6712-6571 [email or web address]. Care instructions adapted under license by [employer] (which disclaims liability or warranty for this information). If you have questions about a medical condition or this instruction, always ask your healthcare professional. [address] any warranty or liability for your use of this information.

## 2021-07-01 NOTE — PROGRESS NOTES
Clinton Valdivia presents today for No chief complaint on file. Clinton Valdivia preferred language for health care discussion is english/other. Is someone accompanying this pt? Is the patient using any DME equipment during OV? Depression Screening:  3 most recent PHQ Screens 6/8/2021   Little interest or pleasure in doing things Not at all   Feeling down, depressed, irritable, or hopeless Not at all   Total Score PHQ 2 0       Learning Assessment:  Learning Assessment 12/3/2019   PRIMARY LEARNER Patient   PRIMARY LANGUAGE ENGLISH   LEARNER PREFERENCE PRIMARY DEMONSTRATION   ANSWERED BY Patient   RELATIONSHIP SELF       Abuse Screening:  Abuse Screening Questionnaire 6/8/2021   Do you ever feel afraid of your partner? N   Are you in a relationship with someone who physically or mentally threatens you? N   Is it safe for you to go home? Y       Fall Risk  Fall Risk Assessment, last 12 mths 6/8/2021   Able to walk? Yes   Fall in past 12 months? 0   Do you feel unsteady? 0   Are you worried about falling 0       Pt currently taking Anticoagulant therapy? no    Coordination of Care:  1. Have you been to the ER, urgent care clinic since your last visit? Hospitalized since your last visit? no    2. Have you seen or consulted any other health care providers outside of the [address] [place] [address] since your last visit? Include any pap smears or colon screening.  no

## 2021-07-12 NOTE — PROGRESS NOTES
I have personally seen and evaluated the device findings. Interrogation reviewed and I agree with assessment.     Marquita Chaparro

## 2021-08-18 ENCOUNTER — OFFICE VISIT (OUTPATIENT)
Dept: CARDIOLOGY CLINIC | Age: 71
End: 2021-08-18
Payer: MEDICARE

## 2021-08-18 DIAGNOSIS — I48.91 ATRIAL FIBRILLATION, UNSPECIFIED TYPE (HCC): ICD-10-CM

## 2021-08-18 DIAGNOSIS — Z95.9 CARDIAC DEVICE IN SITU: Primary | ICD-10-CM

## 2021-08-18 LAB
CREATININE, EXTERNAL: 0.94
LDL-C, EXTERNAL: 69
PSA, EXTERNAL: 0.9

## 2021-08-18 PROCEDURE — 93298 REM INTERROG DEV EVAL SCRMS: CPT | Performed by: INTERNAL MEDICINE

## 2021-08-27 NOTE — PROGRESS NOTES
Dr Serena Witt patient. Implantable Loop recorder , 0 Symptoms. 0 Tachy events. 0 Mani events. 0 pauses. 0 AT/AF events.

## 2021-09-22 ENCOUNTER — IMPORTED ENCOUNTER (OUTPATIENT)
Dept: URBAN - NONMETROPOLITAN AREA CLINIC 1 | Facility: CLINIC | Age: 71
End: 2021-09-22

## 2021-09-22 PROCEDURE — 92014 COMPRE OPH EXAM EST PT 1/>: CPT

## 2021-09-22 NOTE — PATIENT DISCUSSION
Cataract OU+MILD PROGRESSION-Not yet surgical. -Reviewed symptoms of advancing cataract growth such as glare and halos and decreased vision.-Continue to monitor for now. Pt will notify us if any new symptoms develop.

## 2021-11-23 RX ORDER — METOPROLOL TARTRATE 25 MG/1
25 TABLET, FILM COATED ORAL 2 TIMES DAILY
Qty: 180 TABLET | Refills: 3 | Status: SHIPPED | OUTPATIENT
Start: 2021-11-23 | End: 2022-02-14 | Stop reason: SDUPTHER

## 2021-12-06 RX ORDER — ATORVASTATIN CALCIUM 40 MG/1
TABLET, FILM COATED ORAL
Qty: 90 TABLET | Refills: 0 | Status: SHIPPED | OUTPATIENT
Start: 2021-12-06 | End: 2022-04-04

## 2021-12-06 RX ORDER — LOSARTAN POTASSIUM 25 MG/1
25 TABLET ORAL DAILY
Qty: 30 TABLET | Refills: 6 | Status: SHIPPED | OUTPATIENT
Start: 2021-12-06 | End: 2022-04-13 | Stop reason: SDUPTHER

## 2021-12-07 ENCOUNTER — OFFICE VISIT (OUTPATIENT)
Dept: CARDIOLOGY CLINIC | Age: 71
End: 2021-12-07
Payer: MEDICARE

## 2021-12-07 VITALS
WEIGHT: 211 LBS | DIASTOLIC BLOOD PRESSURE: 84 MMHG | SYSTOLIC BLOOD PRESSURE: 134 MMHG | OXYGEN SATURATION: 97 % | HEART RATE: 83 BPM | HEIGHT: 67 IN | BODY MASS INDEX: 33.12 KG/M2

## 2021-12-07 DIAGNOSIS — I25.10 CORONARY ARTERY DISEASE INVOLVING NATIVE CORONARY ARTERY OF NATIVE HEART WITHOUT ANGINA PECTORIS: Primary | ICD-10-CM

## 2021-12-07 PROCEDURE — 3017F COLORECTAL CA SCREEN DOC REV: CPT | Performed by: INTERNAL MEDICINE

## 2021-12-07 PROCEDURE — G8510 SCR DEP NEG, NO PLAN REQD: HCPCS | Performed by: INTERNAL MEDICINE

## 2021-12-07 PROCEDURE — 99214 OFFICE O/P EST MOD 30 MIN: CPT | Performed by: INTERNAL MEDICINE

## 2021-12-07 PROCEDURE — G8427 DOCREV CUR MEDS BY ELIG CLIN: HCPCS | Performed by: INTERNAL MEDICINE

## 2021-12-07 PROCEDURE — 1101F PT FALLS ASSESS-DOCD LE1/YR: CPT | Performed by: INTERNAL MEDICINE

## 2021-12-07 PROCEDURE — G8417 CALC BMI ABV UP PARAM F/U: HCPCS | Performed by: INTERNAL MEDICINE

## 2021-12-07 PROCEDURE — 93000 ELECTROCARDIOGRAM COMPLETE: CPT | Performed by: INTERNAL MEDICINE

## 2021-12-07 PROCEDURE — G8536 NO DOC ELDER MAL SCRN: HCPCS | Performed by: INTERNAL MEDICINE

## 2021-12-07 NOTE — PROGRESS NOTES
HISTORY OF PRESENT ILLNESS  Ebonie Garcia is a 66 y.o. male. ASSESSMENT and PLAN    Mr. Nida Echevarria has history of CAD. He had exertional chest pains noted back in the summer of 2016. Malcolm Quan had echocardiogram and nuclear stress study at [facility] and was told that he had no significant CAD. Two weeks later, with accelerating exertional angina, he was seen by cardiology.  At that time, with his classic symptoms, he was scheduled for coronary angiography, which revealed significant, long LAD as well as diagonal disease. He underwent complex LAD diagonal stent procedure with resolution of his symptoms in September 2016 at [facility]. He also has history of hematuria. He was diagnosed with bladder tumor; surgical biopsy was done.  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  He also only has one kidney with normal kidney function.  His nuclear scan in March 2018 was normal.  His girlfriend was diagnosed with melanoma in 2018; she had this treated with clear margins.   His nuclear scan back in January 2019 showed low risk findings with normal perfusion and EF of 60%.  His echocardiogram in March 2019 showed normal left atrial chamber size and function without significant valvular disease. In August 2020, he presented with increased shortness of breath, and bradycardia.  His coronary angiography at that time showed normal EF 65%.  No significant change from previous procedure of 2016.  Circumflex is large and dominant, widely patent.  Previous proximal LAD stent is widely patent.  Moderate-sized diagonal branch has a smooth 50% stenosis without change.  Because of his bradycardia, he also had Linq inserted. In 2020, he has had other antihypertensive medication regimen added since his beta-blocker was discontinued after 40 years.     · CAD:    Clinically stable. · BP:    Well controlled. · Rhythm:    Stable sinus.   · CHF:    There is no evidence of decompensated CHF noted. · Weight:     His weight today is 211 pounds. His baseline weight is 190 pounds. Again, strong encouragement was given for weight control. I have advised him to decrease carbohydrate intake. · Cholesterol:   Target LDL <70. Lipitor 40. · Anti-platelet:   Remains on ASA, and Plavix. He was again accompanied by his girlfriend. I will see him back in 6-9 months. Thank you. Encounter Diagnoses   Name Primary?  Coronary artery disease involving native coronary artery of native heart without angina pectoris Yes     current treatment plan is effective, no change in therapy  lab results and schedule of future lab studies reviewed with patient  reviewed diet, exercise and weight control  very strongly urged to quit smoking to reduce cardiovascular risk  cardiovascular risk and specific lipid/LDL goals reviewed  use of aspirin to prevent MI and TIA's discussed      HPI   Mr. Estrella has no complaints of chest pains, increased shortness of breath or decreased exercise capacity. Unfortunately, he has been on another 10 pounds. Since 2019, he has put on total of 20 pounds. He remains active physically doing woodworking, rebuilding his garage cabinets, and doing other activities. However, he has been eating too much carbohydrates and sweets. He has been on significant weight. He was again accompanied by his girlfriend. She states that he has been doing well. Review of Systems   Respiratory: Negative for shortness of breath. Cardiovascular: Negative for chest pain, palpitations, orthopnea, claudication, leg swelling and PND. All other systems reviewed and are negative. Physical Exam  Vitals and nursing note reviewed. Constitutional:       Appearance: He is obese. HENT:      Head: Normocephalic. Eyes:      Conjunctiva/sclera: Conjunctivae normal.   Neck:      Vascular: No carotid bruit.    Cardiovascular:      Rate and Rhythm: Normal rate and regular rhythm. Pulmonary:      Breath sounds: Normal breath sounds. Abdominal:      Palpations: Abdomen is soft. Musculoskeletal:         General: No swelling. Cervical back: No rigidity. Skin:     General: Skin is warm and dry. Neurological:      General: No focal deficit present. Mental Status: He is alert and oriented to person, place, and time. Psychiatric:         Mood and Affect: Mood normal.         Behavior: Behavior normal.         PCP: Gurinder Vazquez MD    Past Medical History:   Diagnosis Date    Cardiac cath 09/16/2016    L dom. LM patent. pLAD 70% FFR 0.74 (2.75 x 23-mm Xience ERIC). mD 95% (2.25 x 12-mm Resolute ERIC). Cx patent. RCA patent. LVEDP 4.  EF 60%. Mild anterior RWMA. RA 7.  PA 20/5. RV 19/1. W 9.  CO/CI:  6.2/3.2 (TD); 6.5/3.4 (Hugh).  Chest pain     Coronary artery disease     2 stents placed 9/16/2016    Hematuria     Hypertension     Solitary kidney        Past Surgical History:   Procedure Laterality Date    CARDIAC CATHETERIZATION  9/16/2016         CORONARY ARTERY ANGIOGRAM  9/16/2016         CORONARY STENT SINGLE W/PTCA  9/16/2016         FRACTIONAL FLOW RESERVE  9/16/2016         HX APPENDECTOMY  1958    HX MOHS PROCEDURES Bilateral 1993    HX NEPHRECTOMY Left 2001    HX TONSILLECTOMY  1951    LV ANGIOGRAPHY  9/16/2016         RI INSERTION SUBQ CARDIAC RHYTHM MONITOR W/PRGRMG Left 8/3/2020    Loop Recorder Insert performed by Radha De La Cruz MD at [facility] CATH [facility]    RT & LT HEART WITH C.O.  9/16/2016            Current Outpatient Medications   Medication Sig Dispense Refill    alfuzosin SR (UROXATRAL) 10 mg SR tablet TAKE 1 TABLET BY MOUTH ONCE DAILY AFTER SUPPER 90 Tablet 1    atorvastatin (LIPITOR) 40 mg tablet Take 1 tablet by mouth once daily 90 Tablet 0    losartan (COZAAR) 25 mg tablet Take 1 Tablet by mouth daily. 30 Tablet 6    metoprolol tartrate (LOPRESSOR) 25 mg tablet Take 1 Tablet by mouth two (2) times a day. 180 Tablet 3    apixaban (ELIQUIS) 5 mg tablet Take 1 Tablet by mouth two (2) times a day. 180 Tablet 3    meclizine (ANTIVERT) 25 mg tablet TAKE 1 TABLET BY MOUTH AT BEDTIME AND MAY TAKE 1 EVERY 8 HOURS AS NEEDED      amLODIPine (NORVASC) 5 mg tablet Take 1 Tab by mouth two (2) times a day. 180 Tab 3    aspirin 81 mg chewable tablet Take 81 mg by mouth daily.  glucosamine 1,000 mg tab Take 1,500 mg by mouth daily.  loratadine (ALLERCLEAR) 10 mg tablet Take 10 mg by mouth daily.  docusate sodium (STOOL SOFTENER) 50 mg capsule Take 50 mg by mouth two (2) times a day.  multivitamin (ONE A DAY) tablet Take 1 Tab by mouth daily.  diclofenac EC (VOLTAREN) 75 mg EC tablet Take 75 mg by mouth two (2) times a day.  diclofenac (VOLTAREN) 1 % gel Apply 2 g to affected area two (2) times a day.          The patient has a family history of    Social History     Tobacco Use    Smoking status: Never Smoker    Smokeless tobacco: Never Used   Substance Use Topics    Alcohol use: No    Drug use: No       Lab Results   Component Value Date/Time    Cholesterol, total 143 09/19/2016 05:09 AM    HDL Cholesterol 38 (L) 09/19/2016 05:09 AM    LDL, calculated 85 09/19/2016 05:09 AM    Triglyceride 100 09/19/2016 05:09 AM    CHOL/HDL Ratio 3.8 09/19/2016 05:09 AM        BP Readings from Last 3 Encounters:   12/07/21 134/84   07/01/21 (!) 140/80   06/08/21 (!) 142/80        Pulse Readings from Last 3 Encounters:   12/07/21 83   07/01/21 100   06/08/21 82       Wt Readings from Last 3 Encounters:   12/07/21 95.7 kg (211 lb)   06/08/21 92.9 kg (204 lb 12.8 oz)   12/10/20 90.7 kg (200 lb)         EKG: unchanged from previous tracings, normal sinus rhythm, occasional PVC noted, unifocal.

## 2022-01-19 NOTE — PROGRESS NOTES
I have personally seen and evaluated the device findings. Interrogation reviewed and I agree with assessment.     Gabino Yang

## 2022-02-14 RX ORDER — METOPROLOL TARTRATE 25 MG/1
25 TABLET, FILM COATED ORAL 2 TIMES DAILY
Qty: 180 TABLET | Refills: 3 | Status: SHIPPED | OUTPATIENT
Start: 2022-02-14 | End: 2022-04-13 | Stop reason: SDUPTHER

## 2022-02-15 ENCOUNTER — TELEPHONE (OUTPATIENT)
Dept: CARDIOLOGY CLINIC | Age: 72
End: 2022-02-15

## 2022-02-15 NOTE — TELEPHONE ENCOUNTER
Dr Samira Dixon reviewed loop on unscheduled transmission. Looks like SVT at rates of 167 bpm.     Verbal order and read back per Madeline Aguero MD  increase lopressor to 50 mg BID     Patient is aware and will increase lopressor and will call if any problems or concerns.

## 2022-03-19 PROBLEM — I20.0 UNSTABLE ANGINA (HCC): Status: ACTIVE | Noted: 2020-08-03

## 2022-03-19 PROBLEM — I25.10 CORONARY ARTERY DISEASE INVOLVING NATIVE CORONARY ARTERY: Status: ACTIVE | Noted: 2020-08-03

## 2022-03-19 PROBLEM — R06.02 SHORTNESS OF BREATH AT REST: Status: ACTIVE | Noted: 2020-08-03

## 2022-03-19 PROBLEM — R00.1 SYMPTOMATIC BRADYCARDIA: Status: ACTIVE | Noted: 2020-08-04

## 2022-03-23 ENCOUNTER — OFFICE VISIT (OUTPATIENT)
Dept: CARDIOLOGY CLINIC | Age: 72
End: 2022-03-23
Payer: MEDICARE

## 2022-03-23 DIAGNOSIS — I48.91 ATRIAL FIBRILLATION, UNSPECIFIED TYPE (HCC): ICD-10-CM

## 2022-03-23 DIAGNOSIS — Z95.9 CARDIAC DEVICE IN SITU: Primary | ICD-10-CM

## 2022-04-04 RX ORDER — ATORVASTATIN CALCIUM 40 MG/1
TABLET, FILM COATED ORAL
Qty: 90 TABLET | Refills: 0 | Status: SHIPPED | OUTPATIENT
Start: 2022-04-04 | End: 2022-05-16

## 2022-04-10 ASSESSMENT — VISUAL ACUITY
OS_SC: 20/20
OD_SC: 20/20
OD_SC: 20/25
OS_SC: 20/25
OD_CC: J1
OS_CC: J1
OU_CC: 20/20

## 2022-04-10 ASSESSMENT — TONOMETRY
OD_IOP_MMHG: 20
OD_IOP_MMHG: 15
OS_IOP_MMHG: 14
OS_IOP_MMHG: 20

## 2022-04-13 RX ORDER — METOPROLOL TARTRATE 50 MG/1
50 TABLET ORAL 2 TIMES DAILY
Qty: 180 TABLET | Refills: 3 | Status: SHIPPED | OUTPATIENT
Start: 2022-04-13 | End: 2022-05-31 | Stop reason: SDUPTHER

## 2022-04-13 RX ORDER — LOSARTAN POTASSIUM 25 MG/1
25 TABLET ORAL DAILY
Qty: 90 TABLET | Refills: 3 | Status: SHIPPED | OUTPATIENT
Start: 2022-04-13

## 2022-04-18 PROCEDURE — 93298 REM INTERROG DEV EVAL SCRMS: CPT | Performed by: INTERNAL MEDICINE

## 2022-04-18 NOTE — PROGRESS NOTES
Implantable Loop recorder , 0 Symptoms. 0 Tachy events. 0 Mani events. 0 pauses. 0 AT/AF events.  Dr. Gale Ba patient

## 2022-05-16 RX ORDER — ATORVASTATIN CALCIUM 40 MG/1
TABLET, FILM COATED ORAL
Qty: 90 TABLET | Refills: 0 | Status: SHIPPED | OUTPATIENT
Start: 2022-05-16 | End: 2022-05-26

## 2022-05-26 RX ORDER — ATORVASTATIN CALCIUM 40 MG/1
TABLET, FILM COATED ORAL
Qty: 90 TABLET | Refills: 0 | Status: SHIPPED | OUTPATIENT
Start: 2022-05-26 | End: 2022-08-29

## 2022-05-31 RX ORDER — AMLODIPINE BESYLATE 5 MG/1
5 TABLET ORAL 2 TIMES DAILY
Qty: 180 TABLET | Refills: 3 | Status: SHIPPED | OUTPATIENT
Start: 2022-05-31

## 2022-05-31 RX ORDER — METOPROLOL TARTRATE 50 MG/1
50 TABLET ORAL 2 TIMES DAILY
Qty: 180 TABLET | Refills: 3 | Status: SHIPPED | OUTPATIENT
Start: 2022-05-31

## 2022-08-10 NOTE — PROGRESS NOTES
[place] patient.  Unscheduled transmission, tachy event last 5 sec at max rate and median rate of 178

## 2022-08-13 NOTE — PROGRESS NOTES
Device check personally reviewed by me. Episode reviewed. This appears to be a 5-second run of nonsustained ventricular tachycardia. He can follow-up with Dr. Dang Diaz as scheduled. See scanned interrogation document for complete details.

## 2022-08-29 RX ORDER — ATORVASTATIN CALCIUM 40 MG/1
TABLET, FILM COATED ORAL
Qty: 90 TABLET | Refills: 0 | Status: SHIPPED | OUTPATIENT
Start: 2022-08-29

## 2022-08-31 ENCOUNTER — OFFICE VISIT (OUTPATIENT)
Dept: CARDIOLOGY CLINIC | Age: 72
End: 2022-08-31
Payer: MEDICARE

## 2022-08-31 DIAGNOSIS — R00.1 SYMPTOMATIC BRADYCARDIA: ICD-10-CM

## 2022-08-31 DIAGNOSIS — Z95.9 CARDIAC DEVICE IN SITU: Primary | ICD-10-CM

## 2022-08-31 PROCEDURE — 93298 REM INTERROG DEV EVAL SCRMS: CPT | Performed by: INTERNAL MEDICINE

## 2022-09-13 NOTE — PROGRESS NOTES
Remote monitoring interrogation of Reveal Linq was received.     No auto arrhythmias were recorded.  No patient symptom episodes were recorded. Battery status is Ok.

## 2022-09-28 ENCOUNTER — OFFICE VISIT (OUTPATIENT)
Dept: CARDIOLOGY CLINIC | Age: 72
End: 2022-09-28
Payer: MEDICARE

## 2022-09-28 ENCOUNTER — CLINICAL SUPPORT (OUTPATIENT)
Dept: CARDIOLOGY CLINIC | Age: 72
End: 2022-09-28
Payer: MEDICARE

## 2022-09-28 VITALS
OXYGEN SATURATION: 97 % | HEIGHT: 67 IN | SYSTOLIC BLOOD PRESSURE: 118 MMHG | WEIGHT: 202 LBS | BODY MASS INDEX: 31.71 KG/M2 | HEART RATE: 58 BPM | DIASTOLIC BLOOD PRESSURE: 68 MMHG

## 2022-09-28 DIAGNOSIS — I10 ESSENTIAL HYPERTENSION: ICD-10-CM

## 2022-09-28 DIAGNOSIS — I25.10 CORONARY ARTERY DISEASE INVOLVING NATIVE CORONARY ARTERY OF NATIVE HEART WITHOUT ANGINA PECTORIS: ICD-10-CM

## 2022-09-28 DIAGNOSIS — Z95.9 CARDIAC DEVICE IN SITU: Primary | ICD-10-CM

## 2022-09-28 DIAGNOSIS — R00.1 SYMPTOMATIC BRADYCARDIA: ICD-10-CM

## 2022-09-28 DIAGNOSIS — Z95.9 CARDIAC DEVICE IN SITU: ICD-10-CM

## 2022-09-28 DIAGNOSIS — I48.91 ATRIAL FIBRILLATION, UNSPECIFIED TYPE (HCC): Primary | ICD-10-CM

## 2022-09-28 PROCEDURE — G8536 NO DOC ELDER MAL SCRN: HCPCS | Performed by: INTERNAL MEDICINE

## 2022-09-28 PROCEDURE — G8432 DEP SCR NOT DOC, RNG: HCPCS | Performed by: INTERNAL MEDICINE

## 2022-09-28 PROCEDURE — 99214 OFFICE O/P EST MOD 30 MIN: CPT | Performed by: INTERNAL MEDICINE

## 2022-09-28 PROCEDURE — 1101F PT FALLS ASSESS-DOCD LE1/YR: CPT | Performed by: INTERNAL MEDICINE

## 2022-09-28 PROCEDURE — 93000 ELECTROCARDIOGRAM COMPLETE: CPT | Performed by: INTERNAL MEDICINE

## 2022-09-28 PROCEDURE — 3017F COLORECTAL CA SCREEN DOC REV: CPT | Performed by: INTERNAL MEDICINE

## 2022-09-28 PROCEDURE — G8427 DOCREV CUR MEDS BY ELIG CLIN: HCPCS | Performed by: INTERNAL MEDICINE

## 2022-09-28 PROCEDURE — 1123F ACP DISCUSS/DSCN MKR DOCD: CPT | Performed by: INTERNAL MEDICINE

## 2022-09-28 PROCEDURE — G8417 CALC BMI ABV UP PARAM F/U: HCPCS | Performed by: INTERNAL MEDICINE

## 2022-09-28 RX ORDER — METOPROLOL TARTRATE 25 MG/1
25 TABLET, FILM COATED ORAL 2 TIMES DAILY
Qty: 180 TABLET | Refills: 3 | Status: SHIPPED | OUTPATIENT
Start: 2022-09-28

## 2022-09-28 NOTE — PROGRESS NOTES
Mitch Valerio presents today for   Chief Complaint   Patient presents with    Follow-up     6-9 month follow up- no complaints          Jc Estrella preferred language for health care discussion is english/other. Is someone accompanying this pt? Yes, wife     Is the patient using any DME equipment during [address]? no    Depression Screening:  3 most recent PHQ Screens 12/7/2021   Little interest or pleasure in doing things Not at all   Feeling down, depressed, irritable, or hopeless Not at all   Total Score PHQ 2 0       Learning Assessment:  Learning Assessment 12/3/2019   PRIMARY LEARNER Patient   PRIMARY LANGUAGE ENGLISH   LEARNER PREFERENCE PRIMARY DEMONSTRATION   ANSWERED BY Patient   RELATIONSHIP SELF       Abuse Screening:  Abuse Screening Questionnaire 12/7/2021   Do you ever feel afraid of your partner? N   Are you in a relationship with someone who physically or mentally threatens you? N   Is it safe for you to go home? Y       Fall Risk  Fall Risk Assessment, last 12 mths 12/7/2021   Able to walk? Yes   Fall in past 12 months? 0   Do you feel unsteady? 0   Are you worried about falling 0       Pt currently taking Anticoagulant therapy? Eliquis 5mg twice a day     Coordination of Care:  1. Have you been to the ER, urgent care clinic since your last visit? Hospitalized since your last visit? no    2. Have you seen or consulted any other health care providers outside of the [address] [place] [address] since your last visit? Include any pap smears or colon screening.  no

## 2022-09-28 NOTE — PROGRESS NOTES
HISTORY OF PRESENT ILLNESS  Valentin Macdonald is a 66 y.o. male. ASSESSMENT and PLAN    Mr. Hina Butts has history of CAD. He had exertional chest pains noted back in the summer of 2016. He had echocardiogram and nuclear stress study at [facility] and was told that he had no significant CAD. Two weeks later, with accelerating exertional angina, he was seen by cardiology. At that time, with his classic symptoms, he was scheduled for coronary angiography, which revealed significant, long LAD as well as diagonal disease. He underwent complex LAD diagonal stent procedure with resolution of his symptoms in September 2016 at [facility]. He also has history of hematuria. He was diagnosed with bladder tumor; surgical biopsy was done. 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 He also only has one kidney with normal kidney function. His nuclear scan in March 2018 was normal.  His girlfriend was diagnosed with melanoma in 2018; she had this treated with clear margins. His nuclear scan back in January 2019 showed low risk findings with normal perfusion and EF of 60%. His echocardiogram in March 2019 showed normal left atrial chamber size and function without significant valvular disease. In August 2020, he presented with increased shortness of breath, and bradycardia. His coronary angiography at that time showed normal EF 65%. No significant change from previous procedure of 2016. Circumflex is large and dominant, widely patent. Previous proximal LAD stent is widely patent. Moderate-sized diagonal branch has a smooth 50% stenosis without change. Because of his bradycardia, he also had Linq inserted. In 2020, he has had other antihypertensive medication regimen added since his beta-blocker was discontinued after 40 years. CAD:   Clinically stable. BP:   Well controlled at [address].   Rhythm:   Asymptomatic sinus bradycardia at 58 bpm.  He has noted mildly increased fatigue after increasing his metoprolol from 25 twice daily up to 50 twice daily. Will decrease his metoprolol back down to 25 mg twice daily. CHF:    There is no evidence of decompensated CHF noted. Weight:    His weight today is 202 pounds. His baseline weight is 190 pounds. Again, strong encouragement was given for weight control. Cholesterol:   Target LDL <70. Lipitor 40. Anti-platelet:   Remains on ASA, and Plavix. He was again accompanied by his girlfriend. I will see him back in 9 months. Thank you. Encounter Diagnoses   Name Primary?  Atrial fibrillation, unspecified type ([facility] Yes    Coronary artery disease involving native coronary artery of native heart without angina pectoris     Essential hypertension     Cardiac device in situ     Symptomatic bradycardia      the following changes in treatment are made: See above  lab results and schedule of future lab studies reviewed with patient  reviewed diet, exercise and weight control  cardiovascular risk and specific lipid/LDL goals reviewed  use of aspirin to prevent MI and TIA's discussed    HPI  Today, Mr. Anna Brice has no complaints of chest pains, or increased dyspnea on exertion from his baseline. He and his girlfriend just returned from a 21-day cruise along [place] back to [place] [facility]. He went on sugar excursions without difficulty. He hiked as much as 5 miles on occasion without difficulty. He denies any orthopnea or PND. He denies any palpitations or dizziness. Review of Systems   Respiratory:  Negative for shortness of breath. Cardiovascular:  Negative for chest pain, palpitations, orthopnea, claudication, leg swelling and PND. All other systems reviewed and are negative. Physical Exam  Vitals and nursing note reviewed. Constitutional:       Appearance: He is obese. HENT:      Head: Normocephalic.    Eyes:      Conjunctiva/sclera: Conjunctivae normal.   Cardiovascular: Rate and Rhythm: Regular rhythm. Bradycardia present. Pulmonary:      Breath sounds: Normal breath sounds. Abdominal:      Palpations: Abdomen is soft. Musculoskeletal:         General: No swelling. Cervical back: No rigidity. Skin:     General: Skin is warm and dry. Neurological:      General: No focal deficit present. Mental Status: He is alert and oriented to person, place, and time. Psychiatric:         Mood and Affect: Mood normal.         Behavior: Behavior normal.       PCP: Marj Abdi MD    Past Medical History:   Diagnosis Date    Cardiac cath 09/16/2016    L dom. LM patent. pLAD 70% FFR 0.74 (2.75 x 23-mm Xience ERIC). mD 95% (2.25 x 12-mm Resolute ERIC). Cx patent. RCA patent. LVEDP 4.  EF 60%. Mild anterior RWMA. RA 7.  PA 20/5. RV 19/1. W 9.  CO/CI:  6.2/3.2 (TD); 6.5/3.4 (Hugh).  Chest pain     Coronary artery disease     2 stents placed 9/16/2016    Hematuria     Hypertension     Solitary kidney        Past Surgical History:   Procedure Laterality Date    CARDIAC CATHETERIZATION  9/16/2016         CORONARY ARTERY ANGIOGRAM  9/16/2016         CORONARY STENT SINGLE W/PTCA  9/16/2016         FRACTIONAL FLOW RESERVE  9/16/2016         HX APPENDECTOMY  1958    HX MOHS PROCEDURES Bilateral 1993    HX NEPHRECTOMY Left 2001    HX TONSILLECTOMY  1951    LV ANGIOGRAPHY  9/16/2016         MN INSERTION SUBQ CARDIAC RHYTHM MONITOR W/PRGRMG Left 8/3/2020    Loop Recorder Insert performed by Mariza Thacker MD at [facility] CATH LAB    RT & LT HEART WITH C.O.  9/16/2016            Current Outpatient Medications   Medication Sig Dispense Refill    atorvastatin (LIPITOR) 40 mg tablet TAKE 1 TABLET DAILY 90 Tablet 0    alfuzosin SR (UROXATRAL) 10 mg SR tablet TAKE 1 TABLET BY MOUTH ONCE DAILY AFTER SUPPER 90 Tablet 0    amLODIPine (NORVASC) 5 mg tablet Take 1 Tablet by mouth two (2) times a day.  180 Tablet 3    apixaban (ELIQUIS) 5 mg tablet Take 1 Tablet by mouth two (2) times a day. 180 Tablet 3    losartan (COZAAR) 25 mg tablet Take 1 Tablet by mouth daily. 90 Tablet 3    meclizine (ANTIVERT) 25 mg tablet TAKE 1 TABLET BY MOUTH AT BEDTIME AND MAY TAKE 1 EVERY 8 HOURS AS NEEDED      aspirin 81 mg chewable tablet Take 81 mg by mouth daily.  glucosamine 1,000 mg tab Take 1,500 mg by mouth daily.  metoprolol tartrate (LOPRESSOR) 50 mg tablet Take 1 Tablet by mouth two (2) times a day. 180 Tablet 3    loratadine (ALLERCLEAR) 10 mg tablet Take 10 mg by mouth daily.  docusate sodium (STOOL SOFTENER) 50 mg capsule Take 50 mg by mouth two (2) times a day.  multivitamin (ONE A DAY) tablet Take 1 Tab by mouth daily.  diclofenac EC (VOLTAREN) 75 mg EC tablet Take 75 mg by mouth two (2) times a day.  diclofenac (VOLTAREN) 1 % gel Apply 2 g to affected area two (2) times a day. The patient has a family history of    Social History     Tobacco Use    Smoking status: Never    Smokeless tobacco: Never   Substance Use Topics    Alcohol use: No    Drug use: No       Lab Results   Component Value Date/Time    Cholesterol, total 143 09/19/2016 05:09 AM    HDL Cholesterol 38 (L) 09/19/2016 05:09 AM    LDL, calculated 85 09/19/2016 05:09 AM    Triglyceride 100 09/19/2016 05:09 AM    CHOL/HDL Ratio 3.8 09/19/2016 05:09 AM        BP Readings from Last 3 Encounters:   09/28/22 118/68   12/07/21 134/84   07/01/21 (!) 140/80        Pulse Readings from Last 3 Encounters:   09/28/22 (!) 58   12/07/21 83   07/01/21 100       Wt Readings from Last 3 Encounters:   09/28/22 91.6 kg (202 lb)   12/07/21 95.7 kg (211 lb)   06/08/21 92.9 kg (204 lb 12.8 oz)         EKG: unchanged from previous tracings, sinus bradycardia.

## 2022-09-29 PROCEDURE — 93298 REM INTERROG DEV EVAL SCRMS: CPT | Performed by: INTERNAL MEDICINE

## 2022-12-06 ENCOUNTER — TELEPHONE (OUTPATIENT)
Dept: CARDIOLOGY CLINIC | Age: 72
End: 2022-12-06

## 2022-12-06 ENCOUNTER — OFFICE VISIT (OUTPATIENT)
Dept: CARDIOLOGY CLINIC | Age: 72
End: 2022-12-06
Payer: MEDICARE

## 2022-12-06 DIAGNOSIS — Z95.9 CARDIAC DEVICE IN SITU: Primary | ICD-10-CM

## 2022-12-06 DIAGNOSIS — R00.1 SYMPTOMATIC BRADYCARDIA: ICD-10-CM

## 2022-12-06 DIAGNOSIS — I48.91 ATRIAL FIBRILLATION, UNSPECIFIED TYPE (HCC): ICD-10-CM

## 2022-12-06 PROCEDURE — 93298 REM INTERROG DEV EVAL SCRMS: CPT | Performed by: INTERNAL MEDICINE

## 2022-12-06 NOTE — TELEPHONE ENCOUNTER
Sia has had Tachy episodes with HR's in the 160's - 170's recorded by his loop recorder. I reviewed the episodes with Dr. Vianey Mitchell who wants the patient to come in soon for an office visit and discussion of options. I spoke with Mr. Kal Keith and he is scheduled to come in on `1/14/22 @3:40. .... Michael Lux BSN, RN  Cardiac Device Coordinator for   [facility] and [facility]

## 2022-12-06 NOTE — PROGRESS NOTES
Remote monitoring interrogation of Reveal Linq was received. Battery status is Ok.    7 tachy arrhythmias were recorded with the average HR's from 160's - 170's.      No patient symptom episodes were recorded. I reviewed the ECG's with Dr. Adelita Dalal and he asked  to schedule the patient to come in soon for an office visit to see him   Pt. Will be seen in office on 12/14/22.

## 2022-12-07 RX ORDER — METOPROLOL TARTRATE 25 MG/1
25 TABLET, FILM COATED ORAL 2 TIMES DAILY
Qty: 180 TABLET | Refills: 3 | Status: SHIPPED | OUTPATIENT
Start: 2022-12-07

## 2022-12-09 RX ORDER — ATORVASTATIN CALCIUM 40 MG/1
TABLET, FILM COATED ORAL
Qty: 90 TABLET | Refills: 3 | Status: SHIPPED | OUTPATIENT
Start: 2022-12-09

## 2022-12-14 ENCOUNTER — OFFICE VISIT (OUTPATIENT)
Dept: CARDIOLOGY CLINIC | Age: 72
End: 2022-12-14
Payer: MEDICARE

## 2022-12-14 VITALS
WEIGHT: 204 LBS | BODY MASS INDEX: 32.02 KG/M2 | HEART RATE: 63 BPM | DIASTOLIC BLOOD PRESSURE: 72 MMHG | HEIGHT: 67 IN | SYSTOLIC BLOOD PRESSURE: 132 MMHG | OXYGEN SATURATION: 97 %

## 2022-12-14 DIAGNOSIS — R00.1 SYMPTOMATIC BRADYCARDIA: ICD-10-CM

## 2022-12-14 DIAGNOSIS — I25.10 CORONARY ARTERY DISEASE INVOLVING NATIVE CORONARY ARTERY OF NATIVE HEART WITHOUT ANGINA PECTORIS: Primary | ICD-10-CM

## 2022-12-14 DIAGNOSIS — I48.91 ATRIAL FIBRILLATION, UNSPECIFIED TYPE (HCC): ICD-10-CM

## 2022-12-14 PROCEDURE — 1101F PT FALLS ASSESS-DOCD LE1/YR: CPT | Performed by: INTERNAL MEDICINE

## 2022-12-14 PROCEDURE — G8536 NO DOC ELDER MAL SCRN: HCPCS | Performed by: INTERNAL MEDICINE

## 2022-12-14 PROCEDURE — G8427 DOCREV CUR MEDS BY ELIG CLIN: HCPCS | Performed by: INTERNAL MEDICINE

## 2022-12-14 PROCEDURE — 1123F ACP DISCUSS/DSCN MKR DOCD: CPT | Performed by: INTERNAL MEDICINE

## 2022-12-14 PROCEDURE — 93000 ELECTROCARDIOGRAM COMPLETE: CPT | Performed by: INTERNAL MEDICINE

## 2022-12-14 PROCEDURE — 99214 OFFICE O/P EST MOD 30 MIN: CPT | Performed by: INTERNAL MEDICINE

## 2022-12-14 PROCEDURE — G8417 CALC BMI ABV UP PARAM F/U: HCPCS | Performed by: INTERNAL MEDICINE

## 2022-12-14 PROCEDURE — G8432 DEP SCR NOT DOC, RNG: HCPCS | Performed by: INTERNAL MEDICINE

## 2022-12-14 PROCEDURE — 3017F COLORECTAL CA SCREEN DOC REV: CPT | Performed by: INTERNAL MEDICINE

## 2022-12-14 NOTE — PROGRESS NOTES
HISTORY OF PRESENT ILLNESS  Claire Azul is a 74 y.o. male. ASSESSMENT and PLAN    Mr. Jc Sepulveda has history of CAD. He had exertional chest pains noted back in the summer of 2016. He had echocardiogram and nuclear stress study at [facility] and was told that he had no significant CAD. Two weeks later, with accelerating exertional angina, he was seen by cardiology. At that time, with his classic symptoms, he was scheduled for coronary angiography, which revealed significant, long LAD as well as diagonal disease. He underwent complex LAD diagonal stent procedure with resolution of his symptoms in September 2016 at [facility]. He also has history of hematuria. He was diagnosed with bladder tumor; surgical biopsy was done. 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 He also only has one kidney with normal kidney function. His nuclear scan in March 2018 was normal.  His girlfriend was diagnosed with melanoma in 2018; she had this treated with clear margins. His nuclear scan back in January 2019 showed low risk findings with normal perfusion and EF of 60%. His echocardiogram in March 2019 showed normal left atrial chamber size and function without significant valvular disease. In August 2020, he presented with increased shortness of breath, and bradycardia. His coronary angiography at that time showed normal EF 65%. No significant change from previous procedure of 2016. Circumflex is large and dominant, widely patent. Previous proximal LAD stent is widely patent. Moderate-sized diagonal branch has a smooth 50% stenosis without change. Because of his bradycardia, he also had Linq inserted. In 2020, he has had other antihypertensive medication regimen added since his beta-blocker was discontinued after 40 years.    His Linq monitor shows frequent episodes of rapid atrial fibrillation ranging 160-180 bpm.    CAD: Clinically stable. PAF: His Linq monitor shows 2 episodes of PAF in the last several months with rates up to 165 bpm.  He did not have any symptoms with these episodes. BP:   Well controlled at 132/72. Rhythm:   Stable sinus at 63 bpm.  CHF:    There is no evidence of decompensated CHF noted. Weight:    His weight today is 204 pounds. His baseline weight is 190 pounds. Last time, he weighed 202 pounds. Again, strong encouragement was given for weight loss especially in light of the fact that he has PAF. Cholesterol:   Target LDL <70. Lipitor 40. Anti-platelet:   Remains on ASA, and Plavix. He was again accompanied by his girlfriend. I will see him back in 9 months. Thank you. Encounter Diagnoses   Name Primary? Coronary artery disease involving native coronary artery of native heart without angina pectoris Yes    Atrial fibrillation, unspecified type (HCC)     Symptomatic bradycardia      current treatment plan is effective, no change in therapy  lab results and schedule of future lab studies reviewed with patient  reviewed diet, exercise and weight control  cardiovascular risk and specific lipid/LDL goals reviewed  use of aspirin to prevent MI and TIA's discussed    Follow-up  Pertinent negatives include no chest pain and no shortness of breath. Today, Mr. Noman Vrema has no complaints of chest pains, increased shortness of breath, or decreased exercise capacity. Despite the fact that he has episodes of rapid atrial fibrillation, he has not had any palpitation sensation, dizziness or shortness of breath. He did not know that he had atrial fibrillation episodes. He remains active physically. Both he and his wife travel quite a bit without limitation. He denies any orthopnea or PND. Review of Systems   Respiratory:  Negative for shortness of breath. Cardiovascular:  Negative for chest pain, palpitations, orthopnea, claudication, leg swelling and PND.    All other systems reviewed and are negative. Physical Exam  Vitals and nursing note reviewed. Constitutional:       Appearance: He is obese. HENT:      Head: Normocephalic. Eyes:      Conjunctiva/sclera: Conjunctivae normal.   Neck:      Vascular: No carotid bruit. Cardiovascular:      Rate and Rhythm: Normal rate and regular rhythm. Pulmonary:      Breath sounds: Normal breath sounds. Abdominal:      Palpations: Abdomen is soft. Musculoskeletal:         General: No swelling. Cervical back: No rigidity. Skin:     General: Skin is warm and dry. Neurological:      General: No focal deficit present. Mental Status: He is alert and oriented to person, place, and time. Psychiatric:         Mood and Affect: Mood normal.         Behavior: Behavior normal.         PCP: Sandra Oliveros MD    Past Medical History:   Diagnosis Date    Cardiac cath 09/16/2016    L dom. LM patent. pLAD 70% FFR 0.74 (2.75 x 23-mm Xience ERIC). mD 95% (2.25 x 12-mm Resolute ERIC). Cx patent. RCA patent. LVEDP 4.  EF 60%. Mild anterior RWMA. RA 7.  PA 20/5. RV 19/1. W 9.  CO/CI:  6.2/3.2 (TD); 6.5/3.4 (Hugh).       Chest pain     Coronary artery disease     2 stents placed 9/16/2016    Hematuria     Hypertension     Solitary kidney        Past Surgical History:   Procedure Laterality Date    CARDIAC CATHETERIZATION  9/16/2016         CORONARY ARTERY ANGIOGRAM  9/16/2016         CORONARY STENT SINGLE W/PTCA  9/16/2016         FRACTIONAL FLOW RESERVE  9/16/2016         HX APPENDECTOMY  1958    HX MOHS PROCEDURES Bilateral 1993    HX NEPHRECTOMY Left 2001    HX TONSILLECTOMY  1951    LV ANGIOGRAPHY  9/16/2016         ID INSERTION SUBQ CARDIAC RHYTHM MONITOR W/PRGRMG Left 8/3/2020    Loop Recorder Insert performed by Kang Connolly MD at [facility] CATH LAB    RT & LT HEART WITH C.O.  9/16/2016            Current Outpatient Medications   Medication Sig Dispense Refill    atorvastatin (LIPITOR) 40 mg tablet TAKE 1 TABLET DAILY 90 Tablet 3 metoprolol tartrate (LOPRESSOR) 25 mg tablet Take 1 Tablet by mouth two (2) times a day. 180 Tablet 3    alfuzosin SR (UROXATRAL) 10 mg SR tablet TAKE 1 TABLET BY MOUTH ONCE DAILY AFTER SUPPER 90 Tablet 0    amLODIPine (NORVASC) 5 mg tablet Take 1 Tablet by mouth two (2) times a day. 180 Tablet 3    apixaban (ELIQUIS) 5 mg tablet Take 1 Tablet by mouth two (2) times a day. 180 Tablet 3    losartan (COZAAR) 25 mg tablet Take 1 Tablet by mouth daily. 90 Tablet 3    meclizine (ANTIVERT) 25 mg tablet TAKE 1 TABLET BY MOUTH AT BEDTIME AND MAY TAKE 1 EVERY 8 HOURS AS NEEDED      aspirin 81 mg chewable tablet Take 81 mg by mouth daily. glucosamine 1,000 mg tab Take 1,500 mg by mouth daily. loratadine (ALLERCLEAR) 10 mg tablet Take 10 mg by mouth daily. docusate sodium (STOOL SOFTENER) 50 mg capsule Take 50 mg by mouth two (2) times a day. multivitamin (ONE A DAY) tablet Take 1 Tab by mouth daily. diclofenac EC (VOLTAREN) 75 mg EC tablet Take 75 mg by mouth two (2) times a day. diclofenac (VOLTAREN) 1 % gel Apply 2 g to affected area two (2) times a day.          The patient has a family history of    Social History     Tobacco Use    Smoking status: Never    Smokeless tobacco: Never   Substance Use Topics    Alcohol use: No    Drug use: No       Lab Results   Component Value Date/Time    Cholesterol, total 143 09/19/2016 05:09 AM    HDL Cholesterol 38 (L) 09/19/2016 05:09 AM    LDL, calculated 85 09/19/2016 05:09 AM    Triglyceride 100 09/19/2016 05:09 AM    CHOL/HDL Ratio 3.8 09/19/2016 05:09 AM        BP Readings from Last 3 Encounters:   09/28/22 118/68   12/07/21 134/84   07/01/21 (!) 140/80        Pulse Readings from Last 3 Encounters:   09/28/22 (!) 58   12/07/21 83   07/01/21 100       Wt Readings from Last 3 Encounters:   09/28/22 91.6 kg (202 lb)   12/07/21 95.7 kg (211 lb)   06/08/21 92.9 kg (204 lb 12.8 oz)         EKG: normal EKG, normal sinus rhythm, unchanged from previous tracings.

## 2022-12-19 NOTE — PROGRESS NOTES
In-office interrogation of the Reveal Linq loop recorder.      4 Symptom episodes were recorded   4 AT/AF episodes recorded   51 Tachy episodes, longest was 30 min with a median rate of 160's   Most recent event was on July 1, 2022

## 2023-01-06 NOTE — PROGRESS NOTES
Device check personally reviewed by me. Normal device function on interrogation. Arrhythmias noted. Will defer management to primary cardiologist.  See scanned interrogation document for complete details.

## 2023-03-22 ENCOUNTER — OFFICE VISIT (OUTPATIENT)
Age: 73
End: 2023-03-22

## 2023-03-22 DIAGNOSIS — Z95.9 CARDIAC DEVICE IN SITU: ICD-10-CM

## 2023-03-22 DIAGNOSIS — R00.1 BRADYCARDIA, UNSPECIFIED: Primary | ICD-10-CM

## 2023-03-23 NOTE — PROGRESS NOTES
Carelink : Implantable Loop recorder , 0 Symptoms. 0 Tachy events. 0 Wilmar events. 0 pauses. 0 AT/AF events.  Dr. Hudson Common patient

## 2023-04-17 RX ORDER — LOSARTAN POTASSIUM 25 MG/1
TABLET ORAL
Qty: 90 TABLET | Refills: 3 | Status: SHIPPED | OUTPATIENT
Start: 2023-04-17

## 2023-06-26 ENCOUNTER — NEW PATIENT (OUTPATIENT)
Dept: URBAN - METROPOLITAN AREA CLINIC 1 | Facility: CLINIC | Age: 73
End: 2023-06-26

## 2023-06-26 DIAGNOSIS — H40.013: ICD-10-CM

## 2023-06-26 DIAGNOSIS — H25.813: ICD-10-CM

## 2023-06-26 PROCEDURE — 92015 DETERMINE REFRACTIVE STATE: CPT

## 2023-06-26 PROCEDURE — 92004 COMPRE OPH EXAM NEW PT 1/>: CPT

## 2023-06-26 ASSESSMENT — VISUAL ACUITY
OD_CC: 20/40
OS_CC: J1
OS_CC: 20/30-2
OD_CC: J1
OS_BAT: 20/80
OD_BAT: 20/70

## 2023-06-26 ASSESSMENT — TONOMETRY
OS_IOP_MMHG: 17
OD_IOP_MMHG: 17

## 2023-06-28 ENCOUNTER — OFFICE VISIT (OUTPATIENT)
Age: 73
End: 2023-06-28

## 2023-06-28 DIAGNOSIS — R00.1 BRADYCARDIA, UNSPECIFIED: Primary | ICD-10-CM

## 2023-06-28 DIAGNOSIS — Z95.9 CARDIAC DEVICE IN SITU: ICD-10-CM

## 2023-07-03 NOTE — PROGRESS NOTES
[employer] : Implantable Loop recorder , 0 Symptoms. 1 Tachy events, lasting 11 sec at a rate of 194 bpm,  0 Wilmar events. 0 pauses. 0 AT/AF events. Patient has a hx PAF of and is taking Eliquis.

## 2023-07-05 ENCOUNTER — TELEPHONE (OUTPATIENT)
Age: 73
End: 2023-07-05

## 2023-07-05 NOTE — TELEPHONE ENCOUNTER
[facility] fax request to clear patient for cataract surgery with MAC anesthesia     Verbal order and read back per Reena Naik MD  Low risk for procedure and can hold eliquis 2 days prior.     Faxed letter back to provider office

## 2023-07-10 RX ORDER — APIXABAN 5 MG/1
TABLET, FILM COATED ORAL
Qty: 180 TABLET | Refills: 3 | Status: SHIPPED | OUTPATIENT
Start: 2023-07-10

## 2023-08-14 RX ORDER — AMLODIPINE BESYLATE 5 MG/1
TABLET ORAL
Qty: 180 TABLET | Refills: 3 | Status: SHIPPED | OUTPATIENT
Start: 2023-08-14

## 2023-08-15 ENCOUNTER — PRE-OP/H&P (OUTPATIENT)
Dept: URBAN - METROPOLITAN AREA CLINIC 1 | Facility: CLINIC | Age: 73
End: 2023-08-15

## 2023-08-15 VITALS
WEIGHT: 200 LBS | HEIGHT: 67 IN | SYSTOLIC BLOOD PRESSURE: 220 MMHG | BODY MASS INDEX: 31.39 KG/M2 | HEART RATE: 101 BPM | DIASTOLIC BLOOD PRESSURE: 147 MMHG

## 2023-08-15 DIAGNOSIS — H25.813: ICD-10-CM

## 2023-08-15 PROCEDURE — 92025 CPTRIZED CORNEAL TOPOGRAPHY: CPT

## 2023-08-15 PROCEDURE — 99499 UNLISTED E&M SERVICE: CPT

## 2023-08-15 PROCEDURE — 92136 OPHTHALMIC BIOMETRY: CPT

## 2023-08-15 ASSESSMENT — VISUAL ACUITY
OD_CC: 20/40
OD_PH: 20/25
OS_BAT: 20/80
OS_CC: J1
OD_BAT: 20/70
OD_CC: J1
OS_CC: 20/30
OS_PH: 20/25

## 2023-08-15 ASSESSMENT — TONOMETRY
OD_IOP_MMHG: 16
OS_IOP_MMHG: 16

## 2023-08-23 ENCOUNTER — SURGERY/PROCEDURE (OUTPATIENT)
Dept: URBAN - METROPOLITAN AREA SURGERY 1 | Facility: SURGERY | Age: 73
End: 2023-08-23

## 2023-08-23 DIAGNOSIS — H25.812: ICD-10-CM

## 2023-08-23 PROCEDURE — 66984 XCAPSL CTRC RMVL W/O ECP: CPT

## 2023-08-24 ENCOUNTER — POST-OP (OUTPATIENT)
Dept: URBAN - METROPOLITAN AREA CLINIC 1 | Facility: CLINIC | Age: 73
End: 2023-08-24

## 2023-08-24 DIAGNOSIS — Z96.1: ICD-10-CM

## 2023-08-24 PROCEDURE — 99024 POSTOP FOLLOW-UP VISIT: CPT

## 2023-08-24 RX ORDER — OFLOXACIN 3 MG/ML: 1 SOLUTION/ DROPS OPHTHALMIC TWICE A DAY

## 2023-08-24 RX ORDER — PREDNISOLONE ACETATE 10 MG/ML: 1 SUSPENSION/ DROPS OPHTHALMIC TWICE A DAY

## 2023-08-24 ASSESSMENT — TONOMETRY: OS_IOP_MMHG: 25

## 2023-08-24 ASSESSMENT — VISUAL ACUITY: OS_SC: 20/15

## 2023-08-29 ENCOUNTER — POST OP/EVAL OF SECOND EYE (OUTPATIENT)
Dept: URBAN - METROPOLITAN AREA CLINIC 1 | Facility: CLINIC | Age: 73
End: 2023-08-29

## 2023-08-29 VITALS
WEIGHT: 200 LBS | SYSTOLIC BLOOD PRESSURE: 159 MMHG | DIASTOLIC BLOOD PRESSURE: 90 MMHG | BODY MASS INDEX: 31.39 KG/M2 | HEIGHT: 67 IN | HEART RATE: 64 BPM

## 2023-08-29 DIAGNOSIS — Z96.1: ICD-10-CM

## 2023-08-29 DIAGNOSIS — H25.811: ICD-10-CM

## 2023-08-29 PROCEDURE — 92136 OPHTHALMIC BIOMETRY: CPT

## 2023-08-29 PROCEDURE — 92025 CPTRIZED CORNEAL TOPOGRAPHY: CPT

## 2023-08-29 PROCEDURE — 99024 POSTOP FOLLOW-UP VISIT: CPT

## 2023-08-29 ASSESSMENT — VISUAL ACUITY
OS_SC: 20/20
OD_SC: 20/30-2

## 2023-08-29 ASSESSMENT — TONOMETRY
OS_IOP_MMHG: 19
OD_IOP_MMHG: 20

## 2023-09-06 ENCOUNTER — SURGERY/PROCEDURE (OUTPATIENT)
Dept: URBAN - METROPOLITAN AREA SURGERY 1 | Facility: SURGERY | Age: 73
End: 2023-09-06

## 2023-09-06 DIAGNOSIS — H25.811: ICD-10-CM

## 2023-09-06 PROCEDURE — 66984 XCAPSL CTRC RMVL W/O ECP: CPT

## 2023-09-07 ENCOUNTER — POST-OP (OUTPATIENT)
Dept: URBAN - METROPOLITAN AREA CLINIC 1 | Facility: CLINIC | Age: 73
End: 2023-09-07

## 2023-09-07 DIAGNOSIS — Z96.1: ICD-10-CM

## 2023-09-07 PROCEDURE — 99024 POSTOP FOLLOW-UP VISIT: CPT

## 2023-09-07 ASSESSMENT — VISUAL ACUITY
OD_SC: 20/20
OU_SC: 20/15
OS_SC: 20/20
OS_SC: J2
OD_SC: J2

## 2023-09-07 ASSESSMENT — TONOMETRY
OS_IOP_MMHG: 17
OD_IOP_MMHG: 18

## 2023-09-13 ENCOUNTER — NURSE ONLY (OUTPATIENT)
Age: 73
End: 2023-09-13

## 2023-09-13 ENCOUNTER — OFFICE VISIT (OUTPATIENT)
Age: 73
End: 2023-09-13

## 2023-09-13 VITALS
DIASTOLIC BLOOD PRESSURE: 82 MMHG | BODY MASS INDEX: 32.33 KG/M2 | HEART RATE: 73 BPM | WEIGHT: 206 LBS | HEIGHT: 67 IN | OXYGEN SATURATION: 96 % | SYSTOLIC BLOOD PRESSURE: 136 MMHG

## 2023-09-13 DIAGNOSIS — I25.10 ATHEROSCLEROSIS OF NATIVE CORONARY ARTERY OF NATIVE HEART WITHOUT ANGINA PECTORIS: Primary | ICD-10-CM

## 2023-09-13 DIAGNOSIS — I48.91 ATRIAL FIBRILLATION, UNSPECIFIED TYPE (HCC): ICD-10-CM

## 2023-09-13 DIAGNOSIS — Z95.9 CARDIAC DEVICE IN SITU: ICD-10-CM

## 2023-09-13 DIAGNOSIS — R00.1 SYMPTOMATIC BRADYCARDIA: Primary | ICD-10-CM

## 2023-09-13 DIAGNOSIS — R00.1 SYMPTOMATIC BRADYCARDIA: ICD-10-CM

## 2023-09-13 ASSESSMENT — ANXIETY QUESTIONNAIRES
1. FEELING NERVOUS, ANXIOUS, OR ON EDGE: 0
GAD7 TOTAL SCORE: 0
2. NOT BEING ABLE TO STOP OR CONTROL WORRYING: 0
3. WORRYING TOO MUCH ABOUT DIFFERENT THINGS: 0
4. TROUBLE RELAXING: 0
6. BECOMING EASILY ANNOYED OR IRRITABLE: 0
5. BEING SO RESTLESS THAT IT IS HARD TO SIT STILL: 0
7. FEELING AFRAID AS IF SOMETHING AWFUL MIGHT HAPPEN: 0

## 2023-09-13 ASSESSMENT — PATIENT HEALTH QUESTIONNAIRE - PHQ9
SUM OF ALL RESPONSES TO PHQ QUESTIONS 1-9: 0
1. LITTLE INTEREST OR PLEASURE IN DOING THINGS: 0
SUM OF ALL RESPONSES TO PHQ QUESTIONS 1-9: 0
2. FEELING DOWN, DEPRESSED OR HOPELESS: 0
SUM OF ALL RESPONSES TO PHQ QUESTIONS 1-9: 0
SUM OF ALL RESPONSES TO PHQ9 QUESTIONS 1 & 2: 0
SUM OF ALL RESPONSES TO PHQ QUESTIONS 1-9: 0

## 2023-09-13 NOTE — PROGRESS NOTES
HISTORY OF PRESENT ILLNESS  Ethan Garcia  74 y.o. male     Chief Complaint   Patient presents with    Follow-up     9 month    Procedure     10/9/23: arthroplasty, hip replacement with Dr. Larry Holden at [address] [place] [address] and PLAN    The primary encounter diagnosis was Atherosclerosis of native coronary artery of native heart without angina pectoris. Diagnoses of Atrial fibrillation, unspecified type ([address]) and Symptomatic bradycardia were also pertinent to this visit. Mr. Randa Ron has history of CAD. He had exertional chest pains noted back in the summer of 2016. He had echocardiogram and nuclear stress study at [facility] and was told that he had no significant CAD. Two weeks later, with accelerating exertional angina, he was seen by cardiology. At that time, with his classic symptoms, he was scheduled for coronary angiography, which revealed significant, long LAD as well as diagonal disease. He underwent complex LAD diagonal stent procedure with resolution of his symptoms in September 2016 at [facility]. He also has history of hematuria. He was diagnosed with bladder tumor; surgical biopsy was done. 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 He also only has one kidney with normal kidney function. His nuclear scan in March 2018 was normal.  His girlfriend was diagnosed with melanoma in 2018; she had this treated with clear margins. His nuclear scan back in January 2019 showed low risk findings with normal perfusion and EF of 60%. His echocardiogram in March 2019 showed normal left atrial chamber size and function without significant valvular disease. In August 2020, he presented with increased shortness of breath, and bradycardia. His coronary angiography at that time showed normal EF 65%. No significant change from previous procedure of 2016.   Circumflex is large and dominant, widely

## 2023-09-13 NOTE — PROGRESS NOTES
Dwayneo Bosworth presents today for   Chief Complaint   Patient presents with    Follow-up     9 month    Procedure     10/9/23: arthroplasty, hip replacement with Dr. Serena Hernandez at [address] preferred language for health care discussion is english/other. Is someone accompanying this pt? yes    Is the patient using any DME equipment during OV? no    Depression Screening:  Depression: Not at risk (9/13/2023)    PHQ-2     PHQ-2 Score: 0        Learning Assessment:  Who is the primary learner? Patient    What is the preferred language for health care of the primary learner? ENGLISH    How does the primary learner prefer to learn new concepts? DEMONSTRATION    Answered By patient    Relationship to Learner SELF           Pt currently taking Anticoagulant therapy? Eliqus 5 mg bid    Pt currently taking Antiplatelet therapy ? Aspirin 81 mg daily      Coordination of Care:  1. Have you been to the ER, urgent care clinic since your last visit? Hospitalized since your last visit? no    2. Have you seen or consulted any other health care providers outside of the [address] [place] [address] since your last visit? Include any pap smears or colon screening.  no

## 2023-09-19 NOTE — RESULT ENCOUNTER NOTE
Device check personally reviewed by me. Normal device function on interrogation. Arrhythmias noted. See scanned interrogation document for complete details.

## 2023-10-05 ENCOUNTER — POST-OP (OUTPATIENT)
Dept: URBAN - METROPOLITAN AREA CLINIC 1 | Facility: CLINIC | Age: 73
End: 2023-10-05

## 2023-10-05 DIAGNOSIS — Z96.1: ICD-10-CM

## 2023-10-05 PROCEDURE — 99024 POSTOP FOLLOW-UP VISIT: CPT

## 2023-10-05 ASSESSMENT — TONOMETRY
OD_IOP_MMHG: 12
OS_IOP_MMHG: 12

## 2023-10-05 ASSESSMENT — VISUAL ACUITY
OD_SC: J2
OD_SC: 20/20
OS_SC: 20/20
OS_SC: J2

## 2023-12-05 ENCOUNTER — OFFICE VISIT (OUTPATIENT)
Age: 73
End: 2023-12-05
Payer: MEDICARE

## 2023-12-05 VITALS
DIASTOLIC BLOOD PRESSURE: 76 MMHG | SYSTOLIC BLOOD PRESSURE: 130 MMHG | BODY MASS INDEX: 32.65 KG/M2 | HEART RATE: 72 BPM | HEIGHT: 67 IN | OXYGEN SATURATION: 92 % | WEIGHT: 208 LBS

## 2023-12-05 DIAGNOSIS — R00.1 SYMPTOMATIC BRADYCARDIA: ICD-10-CM

## 2023-12-05 DIAGNOSIS — Z95.9 PRESENCE OF CARDIAC AND VASCULAR IMPLANT AND GRAFT, UNSPECIFIED: ICD-10-CM

## 2023-12-05 DIAGNOSIS — Z95.9 CARDIAC DEVICE IN SITU: ICD-10-CM

## 2023-12-05 DIAGNOSIS — I25.10 ATHEROSCLEROSIS OF NATIVE CORONARY ARTERY OF NATIVE HEART WITHOUT ANGINA PECTORIS: Primary | ICD-10-CM

## 2023-12-05 DIAGNOSIS — I48.91 ATRIAL FIBRILLATION, UNSPECIFIED TYPE (HCC): ICD-10-CM

## 2023-12-05 DIAGNOSIS — I10 ESSENTIAL (PRIMARY) HYPERTENSION: ICD-10-CM

## 2023-12-05 PROCEDURE — 3017F COLORECTAL CA SCREEN DOC REV: CPT | Performed by: INTERNAL MEDICINE

## 2023-12-05 PROCEDURE — G8484 FLU IMMUNIZE NO ADMIN: HCPCS | Performed by: INTERNAL MEDICINE

## 2023-12-05 PROCEDURE — G8427 DOCREV CUR MEDS BY ELIG CLIN: HCPCS | Performed by: INTERNAL MEDICINE

## 2023-12-05 PROCEDURE — 3075F SYST BP GE 130 - 139MM HG: CPT | Performed by: INTERNAL MEDICINE

## 2023-12-05 PROCEDURE — 3078F DIAST BP <80 MM HG: CPT | Performed by: INTERNAL MEDICINE

## 2023-12-05 PROCEDURE — 1123F ACP DISCUSS/DSCN MKR DOCD: CPT | Performed by: INTERNAL MEDICINE

## 2023-12-05 PROCEDURE — 93000 ELECTROCARDIOGRAM COMPLETE: CPT | Performed by: INTERNAL MEDICINE

## 2023-12-05 PROCEDURE — 99214 OFFICE O/P EST MOD 30 MIN: CPT | Performed by: INTERNAL MEDICINE

## 2023-12-05 PROCEDURE — 1036F TOBACCO NON-USER: CPT | Performed by: INTERNAL MEDICINE

## 2023-12-05 PROCEDURE — G8417 CALC BMI ABV UP PARAM F/U: HCPCS | Performed by: INTERNAL MEDICINE

## 2023-12-05 RX ORDER — TRAMADOL HYDROCHLORIDE 50 MG/1
50 TABLET ORAL PRN
COMMUNITY
Start: 2023-10-31

## 2023-12-05 RX ORDER — ACETAMINOPHEN 500 MG
500 TABLET ORAL EVERY 6 HOURS PRN
COMMUNITY

## 2023-12-05 ASSESSMENT — PATIENT HEALTH QUESTIONNAIRE - PHQ9
1. LITTLE INTEREST OR PLEASURE IN DOING THINGS: 0
SUM OF ALL RESPONSES TO PHQ QUESTIONS 1-9: 0
SUM OF ALL RESPONSES TO PHQ9 QUESTIONS 1 & 2: 0
SUM OF ALL RESPONSES TO PHQ QUESTIONS 1-9: 0
2. FEELING DOWN, DEPRESSED OR HOPELESS: 0

## 2023-12-05 ASSESSMENT — ENCOUNTER SYMPTOMS: SHORTNESS OF BREATH: 1

## 2023-12-05 NOTE — PROGRESS NOTES
Violette Bill presents today for   Chief Complaint   Patient presents with    Follow-up     4 month    Chest Pain     Pressure, Center    Shortness of Breath     At rest    Dizziness     vertigo    Edema     Right foot + ankle       Mirian Whyte preferred language for health care discussion is english/other. Is someone accompanying this pt? No    Is the patient using any DME equipment during OV? No    Depression Screening:  Depression: Not at risk (12/5/2023)    PHQ-2     PHQ-2 Score: 0        Learning Assessment:  Who is the primary learner? Patient    What is the preferred language for health care of the primary learner? ENGLISH    How does the primary learner prefer to learn new concepts? DEMONSTRATION    Answered By Patient    Relationship to Learner SELF           Pt currently taking Anticoagulant therapy? Eliquis 5 mg    Pt currently taking Antiplatelet therapy ? Asa 81 mg      Coordination of Care:  1. Have you been to the ER, urgent care clinic since your last visit? Hospitalized since your last visit? No    2. Have you seen or consulted any other health care providers outside of the [address] [place] [address] since your last visit? Include any pap smears or colon screening.  No
results found for: \"CHOL\", \"CHOLX\", \"CHLST\", \"CHOLV\", \"HDL\", \"HDLC\", \"LDL\", \"LDLC\", \"TGLX\", \"TRIGL\"     BP Readings from Last 3 Encounters:   12/05/23 130/76   09/27/23 120/80   09/21/23 136/82        Pulse Readings from Last 3 Encounters:   12/05/23 72   09/27/23 72   09/13/23 73       Wt Readings from Last 3 Encounters:   12/05/23 94.3 kg (208 lb)   09/27/23 93.4 kg (206 lb)   09/21/23 93.4 kg (206 lb)         EKG: normal EKG, normal sinus rhythm, unchanged from previous tracings.      Maia Maya MD   12/5/2023

## 2023-12-07 DIAGNOSIS — R31.9 HEMATURIA: Primary | ICD-10-CM

## 2023-12-26 NOTE — RESULT ENCOUNTER NOTE
Device check personally reviewed by me.  Short episode of tachycardia lasting for approximately 30 seconds noted.  See scanned interrogation document for complete details.

## 2024-01-03 ENCOUNTER — OFFICE VISIT (OUTPATIENT)
Age: 74
End: 2024-01-03
Payer: MEDICARE

## 2024-01-03 DIAGNOSIS — R00.1 SYMPTOMATIC BRADYCARDIA: Primary | ICD-10-CM

## 2024-01-03 DIAGNOSIS — Z95.9 CARDIAC DEVICE IN SITU: ICD-10-CM

## 2024-01-08 PROCEDURE — 93298 REM INTERROG DEV EVAL SCRMS: CPT | Performed by: INTERNAL MEDICINE

## 2024-01-08 NOTE — PROGRESS NOTES
[employer] : Implantable Loop recorder , 0 Symptoms. 44 Tachy events, all old events, nothing since November. 0 Wilmar events. 0 pauses. 0 AT/AF events.

## 2024-05-02 RX ORDER — LOSARTAN POTASSIUM 25 MG/1
TABLET ORAL
Qty: 90 TABLET | Refills: 3 | Status: SHIPPED | OUTPATIENT
Start: 2024-05-02

## 2024-06-04 ENCOUNTER — COMPREHENSIVE EXAM (OUTPATIENT)
Dept: URBAN - METROPOLITAN AREA CLINIC 1 | Facility: CLINIC | Age: 74
End: 2024-06-04

## 2024-06-04 DIAGNOSIS — Z96.1: ICD-10-CM

## 2024-06-04 DIAGNOSIS — H40.022: ICD-10-CM

## 2024-06-04 DIAGNOSIS — H40.011: ICD-10-CM

## 2024-06-04 PROCEDURE — 92133 CPTRZD OPH DX IMG PST SGM ON: CPT

## 2024-06-04 PROCEDURE — 99214 OFFICE O/P EST MOD 30 MIN: CPT

## 2024-06-04 ASSESSMENT — TONOMETRY
OD_IOP_MMHG: 11
OS_IOP_MMHG: 12

## 2024-06-04 ASSESSMENT — VISUAL ACUITY
OS_SC: 20/20
OS_SC: J2
OD_SC: J2
OD_SC: 20/20

## 2024-06-18 NOTE — PROGRESS NOTES
Ant Whyte presents today for evaluation of complaints of fatigue.  He went to the ER on 5/9/24 ([facility] - we currently do not have access in Care Everywhere).  He states that he was dehydrated and it appears that he was noted to be hypokalemic and EKG showed NSR with frequent PACs.  His wife states that during his last visit with Dr. Awan, she informed him that she had increased his metoprolol from 25mg twice a day to 25mg in the AM and 50mg in the PM.  He states that he has noticed increased fatigue and has had some difficulty with physical therapy and getting his right leg strengthened (s/p right hip replacement in Oct. 2023).      He is a 73 year old male with history of CAD (s/p PCI/stent to the LAD in 9/2016), solitary kidney, HTN, hyperlipidemia, recurrent hematuria with urinary self-catheterization, and chronic NSAID use. He was last seen by Dr. Awan on Dec. 5, 2023.  His last pharmacologic nuclear stress test was done in Sept. 2023 and it showed a small inferior defect which improves slightly during stress suggesting artifact. No convincing evidence for ischemia. TID ratio is 1.00, EF 59%.  He also had an echo done on 9/21/23 and it showed an EF of 55-60%, normal wall motion, normal diastolic function, and mildly dilated aortic root diameter at 3.9 cm.     He presented to the hospital on 8/3/20 with complaints of acute shortness of breath.  He was noted to be bradycardic and diaphoretic in the ED and work-up included a negative CTA chest, negative troponin, and an echo with preserved EF. He received one dose of sublingual NTG with almost complete relief of his symptoms. He was then transferred to [facility] for admission and cardiology consultation.  Upon arrival, he was brought to the cath lab and was found to have no significant CAD. Afterwards a loop recorder was placed.  He has had the loop recorder in place for almost 4 years and his battery is almost at end of life.     He had successful

## 2024-06-26 ENCOUNTER — OFFICE VISIT (OUTPATIENT)
Age: 74
End: 2024-06-26
Payer: MEDICARE

## 2024-06-26 VITALS
OXYGEN SATURATION: 96 % | WEIGHT: 214.2 LBS | DIASTOLIC BLOOD PRESSURE: 78 MMHG | SYSTOLIC BLOOD PRESSURE: 122 MMHG | HEIGHT: 67 IN | BODY MASS INDEX: 33.62 KG/M2 | HEART RATE: 76 BPM

## 2024-06-26 DIAGNOSIS — Z95.9 CARDIAC DEVICE IN SITU: ICD-10-CM

## 2024-06-26 DIAGNOSIS — Z95.9 PRESENCE OF CARDIAC AND VASCULAR IMPLANT AND GRAFT, UNSPECIFIED: ICD-10-CM

## 2024-06-26 DIAGNOSIS — R00.1 SYMPTOMATIC BRADYCARDIA: ICD-10-CM

## 2024-06-26 DIAGNOSIS — I48.91 ATRIAL FIBRILLATION, UNSPECIFIED TYPE (HCC): ICD-10-CM

## 2024-06-26 DIAGNOSIS — I10 ESSENTIAL (PRIMARY) HYPERTENSION: ICD-10-CM

## 2024-06-26 DIAGNOSIS — I25.10 ATHEROSCLEROSIS OF NATIVE CORONARY ARTERY OF NATIVE HEART WITHOUT ANGINA PECTORIS: Primary | ICD-10-CM

## 2024-06-26 PROCEDURE — 1036F TOBACCO NON-USER: CPT | Performed by: NURSE PRACTITIONER

## 2024-06-26 PROCEDURE — 99214 OFFICE O/P EST MOD 30 MIN: CPT | Performed by: NURSE PRACTITIONER

## 2024-06-26 PROCEDURE — G8417 CALC BMI ABV UP PARAM F/U: HCPCS | Performed by: NURSE PRACTITIONER

## 2024-06-26 PROCEDURE — 93000 ELECTROCARDIOGRAM COMPLETE: CPT | Performed by: NURSE PRACTITIONER

## 2024-06-26 PROCEDURE — 1123F ACP DISCUSS/DSCN MKR DOCD: CPT | Performed by: NURSE PRACTITIONER

## 2024-06-26 PROCEDURE — G8427 DOCREV CUR MEDS BY ELIG CLIN: HCPCS | Performed by: NURSE PRACTITIONER

## 2024-06-26 PROCEDURE — 3078F DIAST BP <80 MM HG: CPT | Performed by: NURSE PRACTITIONER

## 2024-06-26 PROCEDURE — 3074F SYST BP LT 130 MM HG: CPT | Performed by: NURSE PRACTITIONER

## 2024-06-26 PROCEDURE — 3017F COLORECTAL CA SCREEN DOC REV: CPT | Performed by: NURSE PRACTITIONER

## 2024-06-26 ASSESSMENT — PATIENT HEALTH QUESTIONNAIRE - PHQ9
SUM OF ALL RESPONSES TO PHQ QUESTIONS 1-9: 0
SUM OF ALL RESPONSES TO PHQ QUESTIONS 1-9: 0
2. FEELING DOWN, DEPRESSED OR HOPELESS: NOT AT ALL
1. LITTLE INTEREST OR PLEASURE IN DOING THINGS: NOT AT ALL
SUM OF ALL RESPONSES TO PHQ QUESTIONS 1-9: 0
SUM OF ALL RESPONSES TO PHQ QUESTIONS 1-9: 0
SUM OF ALL RESPONSES TO PHQ9 QUESTIONS 1 & 2: 0

## 2024-06-26 ASSESSMENT — ENCOUNTER SYMPTOMS
COUGH: 0
VOMITING: 0
ABDOMINAL DISTENTION: 0
NAUSEA: 0
WHEEZING: 0
CHEST TIGHTNESS: 0
BLOOD IN STOOL: 0
SHORTNESS OF BREATH: 0
CONSTIPATION: 0
DIARRHEA: 0

## 2024-06-26 NOTE — PROGRESS NOTES
Ant Whyte presents today for   Chief Complaint   Patient presents with    Follow-up     6 month f/u     ED Follow-up     ED 5/9/24 due to hypokalemia and hypocalcemia     Dizziness     Vertigo        Ant Whyte preferred language for health care discussion is english/other.    Is someone accompanying this pt? yes    Is the patient using any DME equipment during OV? yes    Depression Screening:  Depression: Not at risk (6/26/2024)    PHQ-2     PHQ-2 Score: 0        Learning Assessment:  Who is the primary learner? Patient    What is the preferred language for health care of the primary learner? ENGLISH    How does the primary learner prefer to learn new concepts? DEMONSTRATION    Answered By patient    Relationship to Learner SELF           Pt currently taking Anticoagulant therapy? Eliquis 5 mg 2x daily     Pt currently taking Antiplatelet therapy ? ASA 81 mg 1x daily       Coordination of Care:  1. Have you been to the ER, urgent care clinic since your last visit? Hospitalized since your last visit? yes    2. Have you seen or consulted any other health care providers outside of the [facility] since your last visit? Include any pap smears or colon screening. no

## 2024-07-15 RX ORDER — AMLODIPINE BESYLATE 5 MG/1
TABLET ORAL
Qty: 180 TABLET | Refills: 3 | Status: SHIPPED | OUTPATIENT
Start: 2024-07-15

## 2024-07-25 LAB
ANION GAP SERPL CALCULATED.3IONS-SCNC: 12 MMOL/L (ref 3–15)
BUN BLDV-MCNC: 18 MG/DL (ref 6–22)
CALCIUM SERPL-MCNC: 9.2 MG/DL (ref 8.4–10.5)
CHLORIDE BLD-SCNC: 100 MMOL/L (ref 98–110)
CO2: 25 MMOL/L (ref 20–32)
CREAT SERPL-MCNC: 0.8 MG/DL (ref 0.8–1.6)
GFR, ESTIMATED: >60 ML/MIN/1.73 SQ.M.
GLUCOSE: 136 MG/DL (ref 70–99)
POTASSIUM SERPL-SCNC: 4 MMOL/L (ref 3.5–5.5)
SODIUM BLD-SCNC: 137 MMOL/L (ref 133–145)

## 2024-08-06 RX ORDER — APIXABAN 5 MG/1
TABLET, FILM COATED ORAL
Qty: 180 TABLET | Refills: 3 | Status: SHIPPED | OUTPATIENT
Start: 2024-08-06

## 2024-12-11 ENCOUNTER — TELEPHONE (OUTPATIENT)
Age: 74
End: 2024-12-11

## 2024-12-11 NOTE — TELEPHONE ENCOUNTER
Fax request from    for cardiac clearance for colonoscopy on 1-6-24.    Verbal order and read back per Dav Awan MD      Low risk from cardiac standpoint and can hold eliquis 48 hours prior to procedure    Letter faxed to provider

## 2025-01-08 ENCOUNTER — OFFICE VISIT (OUTPATIENT)
Age: 75
End: 2025-01-08
Payer: MEDICARE

## 2025-01-08 VITALS
DIASTOLIC BLOOD PRESSURE: 82 MMHG | OXYGEN SATURATION: 96 % | BODY MASS INDEX: 33.74 KG/M2 | WEIGHT: 215 LBS | SYSTOLIC BLOOD PRESSURE: 128 MMHG | HEIGHT: 67 IN | HEART RATE: 71 BPM

## 2025-01-08 DIAGNOSIS — R00.1 SYMPTOMATIC BRADYCARDIA: ICD-10-CM

## 2025-01-08 DIAGNOSIS — Z95.9 PRESENCE OF CARDIAC AND VASCULAR IMPLANT AND GRAFT, UNSPECIFIED: ICD-10-CM

## 2025-01-08 DIAGNOSIS — I25.10 ATHEROSCLEROSIS OF NATIVE CORONARY ARTERY OF NATIVE HEART WITHOUT ANGINA PECTORIS: Primary | ICD-10-CM

## 2025-01-08 DIAGNOSIS — I48.91 ATRIAL FIBRILLATION, UNSPECIFIED TYPE (HCC): ICD-10-CM

## 2025-01-08 DIAGNOSIS — I10 ESSENTIAL (PRIMARY) HYPERTENSION: ICD-10-CM

## 2025-01-08 DIAGNOSIS — Z95.9 CARDIAC DEVICE IN SITU: ICD-10-CM

## 2025-01-08 PROCEDURE — 1160F RVW MEDS BY RX/DR IN RCRD: CPT | Performed by: INTERNAL MEDICINE

## 2025-01-08 PROCEDURE — G8417 CALC BMI ABV UP PARAM F/U: HCPCS | Performed by: INTERNAL MEDICINE

## 2025-01-08 PROCEDURE — 1123F ACP DISCUSS/DSCN MKR DOCD: CPT | Performed by: INTERNAL MEDICINE

## 2025-01-08 PROCEDURE — 93000 ELECTROCARDIOGRAM COMPLETE: CPT | Performed by: INTERNAL MEDICINE

## 2025-01-08 PROCEDURE — 1159F MED LIST DOCD IN RCRD: CPT | Performed by: INTERNAL MEDICINE

## 2025-01-08 PROCEDURE — 99214 OFFICE O/P EST MOD 30 MIN: CPT | Performed by: INTERNAL MEDICINE

## 2025-01-08 PROCEDURE — G8427 DOCREV CUR MEDS BY ELIG CLIN: HCPCS | Performed by: INTERNAL MEDICINE

## 2025-01-08 PROCEDURE — 3074F SYST BP LT 130 MM HG: CPT | Performed by: INTERNAL MEDICINE

## 2025-01-08 PROCEDURE — 3079F DIAST BP 80-89 MM HG: CPT | Performed by: INTERNAL MEDICINE

## 2025-01-08 PROCEDURE — 1036F TOBACCO NON-USER: CPT | Performed by: INTERNAL MEDICINE

## 2025-01-08 PROCEDURE — 1126F AMNT PAIN NOTED NONE PRSNT: CPT | Performed by: INTERNAL MEDICINE

## 2025-01-08 ASSESSMENT — PATIENT HEALTH QUESTIONNAIRE - PHQ9
SUM OF ALL RESPONSES TO PHQ9 QUESTIONS 1 & 2: 0
SUM OF ALL RESPONSES TO PHQ QUESTIONS 1-9: 0
SUM OF ALL RESPONSES TO PHQ QUESTIONS 1-9: 0
2. FEELING DOWN, DEPRESSED OR HOPELESS: NOT AT ALL
SUM OF ALL RESPONSES TO PHQ QUESTIONS 1-9: 0
1. LITTLE INTEREST OR PLEASURE IN DOING THINGS: NOT AT ALL
SUM OF ALL RESPONSES TO PHQ QUESTIONS 1-9: 0

## 2025-01-08 NOTE — PROGRESS NOTES
Ant Whyte presents today for   Chief Complaint   Patient presents with    Follow-up     Overdue f/u        Ant MICHAEL Isabell preferred language for health care discussion is english/other.    Is someone accompanying this pt? yes    Is the patient using any DME equipment during OV? yes    Depression Screening:  Depression: Not at risk (1/8/2025)    PHQ-2     PHQ-2 Score: 0        Learning Assessment:  Who is the primary learner? Patient    What is the preferred language for health care of the primary learner? ENGLISH    How does the primary learner prefer to learn new concepts? DEMONSTRATION    Answered By patient    Relationship to Learner SELF           Pt currently taking Anticoagulant therapy? Eliquis 5 mg BID     Pt currently taking Antiplatelet therapy ? ASA 81 mg QD       Coordination of Care:  1. Have you been to the ER, urgent care clinic since your last visit? Hospitalized since your last visit? no    2. Have you seen or consulted any other health care providers outside of the [facility] since your last visit? Include any pap smears or colon screening. no    
HISTORY OF PRESENT ILLNESS  Ant Whyte  74 y.o. male     Chief Complaint   Patient presents with    Follow-up     Overdue f/u        ASSESSMENT and PLAN    The primary encounter diagnosis was Atherosclerosis of native coronary artery of native heart without angina pectoris. Diagnoses of Symptomatic bradycardia, Cardiac device in situ, Atrial fibrillation, unspecified type (HCC), Presence of cardiac and vascular implant and graft, unspecified, and Essential (primary) hypertension were also pertinent to this visit.    Mr. Ant Whyte has history of CAD. He had exertional chest pains summer of 2016.  He had echocardiogram and nuclear stress study at [facility] and was told that he had no significant CAD. Two weeks later, with accelerating exertional angina, he was seen by cardiology with his classic symptoms; his coronary angiography revealed significant, long LAD as well as diagonal disease. He underwent complex LAD diagonal stent procedure with resolution of his symptoms in September 2016 at [facility].  He also has history of hematuria. He was diagnosed with bladder tumor.  Ultimately, he was told that he has dilated atonic bladder; he now self caths with resolution of recurrent UTIs.  He also only has one kidney with normal kidney function.  His nuclear scan in March 2018 was normal.  His girlfriend was diagnosed with melanoma in 2018; she had this treated with clear margins.  He  his longtime girlfriend in 2017.   His nuclear scan back in January 2019 showed low risk findings with normal perfusion and EF of 60%.  His echocardiogram in March 2019 showed normal left atrial chamber size and function without significant valvular disease.  In August 2020, he presented with increased shortness of breath, and bradycardia.  His coronary angiography at that time showed normal EF 65%.  No significant change from previous procedure of 2016.  Circumflex was large and dominant, widely patent.  
resilient/elastic

## 2025-01-13 ENCOUNTER — TELEPHONE (OUTPATIENT)
Age: 75
End: 2025-01-13

## 2025-01-13 NOTE — TELEPHONE ENCOUNTER
Attempted to call patient regarding loop recorder being at [facility].  No response received. Voicemail left for call back.

## 2025-01-13 NOTE — TELEPHONE ENCOUNTER
----- Message from DARWIN COTTON RN sent at 1/13/2025  8:29 AM EST -----  Yes his device is at [facility]. Up to the patient if he would like the device to be removed. If so please forward to N[place] to schedule,     Carmina Garrett RN  Clinical Coordinator Nursing Supervisor   [facility]  ----- Message -----  From: Denisse Dean RN  Sent: 1/8/2025   2:32 PM EST  To: Carmina Garrett RN    Patient saw Dr. Awan today. His loop recorder battery is at [facility]. Plan is to follow up in 6 months, but no orders were given regarding loop. Please advise.    Denisse Dean RN  Clinical Coordinator   [facility]

## 2025-01-21 NOTE — TELEPHONE ENCOUNTER
Called patient again regarding this. Spoke with his wife. She stated that at the previous appointment, they spoke with Dr. Awan, and he told the patient that if the loop recorder is not bothering him, it does not have to be removed. Patient has decided not to remove loop recorder.

## 2025-04-08 ENCOUNTER — FOLLOW UP (OUTPATIENT)
Age: 75
End: 2025-04-08

## 2025-04-08 DIAGNOSIS — H40.023: ICD-10-CM

## 2025-04-08 DIAGNOSIS — Z96.1: ICD-10-CM

## 2025-04-08 DIAGNOSIS — H26.493: ICD-10-CM

## 2025-04-08 PROCEDURE — 99213 OFFICE O/P EST LOW 20 MIN: CPT

## 2025-04-08 PROCEDURE — 92015 DETERMINE REFRACTIVE STATE: CPT

## 2025-04-08 PROCEDURE — 92083 EXTENDED VISUAL FIELD XM: CPT

## 2025-04-11 RX ORDER — LOSARTAN POTASSIUM 25 MG/1
25 TABLET ORAL DAILY
Qty: 90 TABLET | Refills: 3 | Status: SHIPPED | OUTPATIENT
Start: 2025-04-11

## 2025-04-25 ENCOUNTER — CLINIC PROCEDURE ONLY (OUTPATIENT)
Age: 75
End: 2025-04-25

## 2025-04-25 DIAGNOSIS — H26.493: ICD-10-CM

## 2025-04-25 DIAGNOSIS — Z96.1: ICD-10-CM

## 2025-04-25 PROCEDURE — 66821 AFTER CATARACT LASER SURGERY: CPT

## 2025-05-09 ENCOUNTER — CLINIC PROCEDURE ONLY (OUTPATIENT)
Age: 75
End: 2025-05-09

## 2025-05-09 DIAGNOSIS — H26.492: ICD-10-CM

## 2025-05-09 DIAGNOSIS — Z96.1: ICD-10-CM

## 2025-05-09 PROCEDURE — 66821 AFTER CATARACT LASER SURGERY: CPT | Mod: 79,LT

## 2025-05-22 ENCOUNTER — POST-OP (OUTPATIENT)
Age: 75
End: 2025-05-22

## 2025-05-22 DIAGNOSIS — Z96.1: ICD-10-CM

## 2025-05-22 DIAGNOSIS — Z98.890: ICD-10-CM

## 2025-07-08 ENCOUNTER — OFFICE VISIT (OUTPATIENT)
Age: 75
End: 2025-07-08
Payer: MEDICARE

## 2025-07-08 VITALS
HEART RATE: 71 BPM | WEIGHT: 209 LBS | SYSTOLIC BLOOD PRESSURE: 126 MMHG | BODY MASS INDEX: 32.8 KG/M2 | OXYGEN SATURATION: 96 % | HEIGHT: 67 IN | DIASTOLIC BLOOD PRESSURE: 74 MMHG

## 2025-07-08 DIAGNOSIS — I25.10 ATHEROSCLEROSIS OF NATIVE CORONARY ARTERY OF NATIVE HEART WITHOUT ANGINA PECTORIS: ICD-10-CM

## 2025-07-08 DIAGNOSIS — I48.91 ATRIAL FIBRILLATION, UNSPECIFIED TYPE (HCC): Primary | ICD-10-CM

## 2025-07-08 DIAGNOSIS — Z01.810 PRE-OPERATIVE CARDIOVASCULAR EXAMINATION: ICD-10-CM

## 2025-07-08 DIAGNOSIS — R06.02 SHORTNESS OF BREATH: ICD-10-CM

## 2025-07-08 DIAGNOSIS — R00.1 SYMPTOMATIC BRADYCARDIA: ICD-10-CM

## 2025-07-08 PROCEDURE — 1036F TOBACCO NON-USER: CPT | Performed by: INTERNAL MEDICINE

## 2025-07-08 PROCEDURE — G8427 DOCREV CUR MEDS BY ELIG CLIN: HCPCS | Performed by: INTERNAL MEDICINE

## 2025-07-08 PROCEDURE — 1160F RVW MEDS BY RX/DR IN RCRD: CPT | Performed by: INTERNAL MEDICINE

## 2025-07-08 PROCEDURE — 1126F AMNT PAIN NOTED NONE PRSNT: CPT | Performed by: INTERNAL MEDICINE

## 2025-07-08 PROCEDURE — 1159F MED LIST DOCD IN RCRD: CPT | Performed by: INTERNAL MEDICINE

## 2025-07-08 PROCEDURE — G8417 CALC BMI ABV UP PARAM F/U: HCPCS | Performed by: INTERNAL MEDICINE

## 2025-07-08 PROCEDURE — 93000 ELECTROCARDIOGRAM COMPLETE: CPT | Performed by: INTERNAL MEDICINE

## 2025-07-08 PROCEDURE — 99214 OFFICE O/P EST MOD 30 MIN: CPT | Performed by: INTERNAL MEDICINE

## 2025-07-08 PROCEDURE — 1123F ACP DISCUSS/DSCN MKR DOCD: CPT | Performed by: INTERNAL MEDICINE

## 2025-07-08 ASSESSMENT — PATIENT HEALTH QUESTIONNAIRE - PHQ9
SUM OF ALL RESPONSES TO PHQ QUESTIONS 1-9: 0
2. FEELING DOWN, DEPRESSED OR HOPELESS: NOT AT ALL
1. LITTLE INTEREST OR PLEASURE IN DOING THINGS: NOT AT ALL
SUM OF ALL RESPONSES TO PHQ QUESTIONS 1-9: 0

## 2025-07-08 NOTE — PROGRESS NOTES
Ant Whyte presents today for   Chief Complaint   Patient presents with    Follow-up       Ant RODRIGUEZ Isabell preferred language for health care discussion is english/other.    Is someone accompanying this pt? no    Is the patient using any DME equipment during OV? no    Depression Screening:  Depression: Not at risk (7/8/2025)    PHQ-2     PHQ-2 Score: 0        Learning Assessment:  Who is the primary learner? Patient    What is the preferred language for health care of the primary learner? ENGLISH    How does the primary learner prefer to learn new concepts? DEMONSTRATION    Answered By patient    Relationship to Learner SELF           Pt currently taking Anticoagulant therapy? no    Pt currently taking Antiplatelet therapy ? aspirin      Coordination of Care:  1. Have you been to the ER, urgent care clinic since your last visit? Hospitalized since your last visit? no    2. Have you seen or consulted any other health care providers outside of the [facility] since your last visit? Include any pap smears or colon screening. no

## 2025-07-08 NOTE — PROGRESS NOTES
HISTORY OF PRESENT ILLNESS  Ant Whyte  74 y.o. male     Chief Complaint   Patient presents with    Follow-up       ASSESSMENT and PLAN    The primary encounter diagnosis was Atrial fibrillation, unspecified type (HCC). Diagnoses of Atherosclerosis of native coronary artery of native heart without angina pectoris, Symptomatic bradycardia, Shortness of breath, and Pre-operative cardiovascular examination were also pertinent to this visit.    Mr. Ant Whyte has history of CAD. He had exertional chest pains summer of 2016.  He had echocardiogram and nuclear stress study at [facility] and was told that he had no significant CAD. Two weeks later, with accelerating exertional angina, he was seen by cardiology with his classic symptoms; his coronary angiography revealed significant, long LAD as well as diagonal disease. He underwent complex LAD diagonal stent procedure with resolution of his symptoms in September 2016 at [facility].  He also has history of hematuria. He was diagnosed with bladder tumor.  Ultimately, he was told that he has dilated atonic bladder; he now self caths with resolution of recurrent UTIs.  He also only has one kidney with normal kidney function.  His nuclear scan in March 2018 was normal.  His girlfriend was diagnosed with melanoma in 2018; she had this treated with clear margins.  He  his longtime girlfriend in 2017.   His nuclear scan back in January 2019 showed low risk findings with normal perfusion and EF of 60%.  His echocardiogram in March 2019 showed normal left atrial chamber size and function without significant valvular disease.  In August 2020, he presented with increased shortness of breath, and bradycardia.  His coronary angiography at that time showed normal EF 65%.  No significant change from previous procedure of 2016.  Circumflex was large and dominant, widely patent.  Previous proximal LAD stent is widely patent.  Moderate-sized diagonal branch has a

## 2025-07-31 ENCOUNTER — OFFICE VISIT (OUTPATIENT)
Age: 75
End: 2025-07-31
Payer: MEDICARE

## 2025-07-31 VITALS
DIASTOLIC BLOOD PRESSURE: 86 MMHG | WEIGHT: 209 LBS | OXYGEN SATURATION: 94 % | SYSTOLIC BLOOD PRESSURE: 138 MMHG | BODY MASS INDEX: 32.8 KG/M2 | HEART RATE: 80 BPM | HEIGHT: 67 IN

## 2025-07-31 DIAGNOSIS — I25.10 CORONARY ARTERY DISEASE INVOLVING NATIVE HEART WITHOUT ANGINA PECTORIS, UNSPECIFIED VESSEL OR LESION TYPE: ICD-10-CM

## 2025-07-31 DIAGNOSIS — I05.0 MITRAL VALVE STENOSIS, UNSPECIFIED ETIOLOGY: ICD-10-CM

## 2025-07-31 DIAGNOSIS — I48.91 ATRIAL FIBRILLATION, UNSPECIFIED TYPE (HCC): Primary | ICD-10-CM

## 2025-07-31 DIAGNOSIS — R06.00 DYSPNEA, UNSPECIFIED TYPE: ICD-10-CM

## 2025-07-31 DIAGNOSIS — R53.83 OTHER FATIGUE: ICD-10-CM

## 2025-07-31 DIAGNOSIS — G45.9 TIA (TRANSIENT ISCHEMIC ATTACK): ICD-10-CM

## 2025-07-31 PROCEDURE — G8428 CUR MEDS NOT DOCUMENT: HCPCS | Performed by: INTERNAL MEDICINE

## 2025-07-31 PROCEDURE — 1123F ACP DISCUSS/DSCN MKR DOCD: CPT | Performed by: INTERNAL MEDICINE

## 2025-07-31 PROCEDURE — G8417 CALC BMI ABV UP PARAM F/U: HCPCS | Performed by: INTERNAL MEDICINE

## 2025-07-31 PROCEDURE — 99204 OFFICE O/P NEW MOD 45 MIN: CPT | Performed by: INTERNAL MEDICINE

## 2025-07-31 PROCEDURE — 1036F TOBACCO NON-USER: CPT | Performed by: INTERNAL MEDICINE

## 2025-07-31 PROCEDURE — 1126F AMNT PAIN NOTED NONE PRSNT: CPT | Performed by: INTERNAL MEDICINE

## 2025-07-31 RX ORDER — ATORVASTATIN CALCIUM 40 MG/1
40 TABLET, FILM COATED ORAL DAILY
Qty: 90 TABLET | Refills: 3 | Status: SHIPPED | OUTPATIENT
Start: 2025-07-31

## 2025-07-31 RX ORDER — METOPROLOL TARTRATE 25 MG/1
25 TABLET, FILM COATED ORAL 2 TIMES DAILY
Qty: 180 TABLET | Refills: 3 | Status: SHIPPED | OUTPATIENT
Start: 2025-07-31

## 2025-07-31 RX ORDER — LOSARTAN POTASSIUM 25 MG/1
25 TABLET ORAL DAILY
Qty: 90 TABLET | Refills: 3 | Status: SHIPPED | OUTPATIENT
Start: 2025-07-31

## 2025-07-31 RX ORDER — AMLODIPINE BESYLATE 5 MG/1
5 TABLET ORAL 2 TIMES DAILY
Qty: 180 TABLET | Refills: 3 | Status: SHIPPED | OUTPATIENT
Start: 2025-07-31

## 2025-07-31 RX ORDER — METOPROLOL TARTRATE 25 MG/1
25 TABLET, FILM COATED ORAL 2 TIMES DAILY
COMMUNITY
End: 2025-07-31 | Stop reason: SDUPTHER

## 2025-07-31 NOTE — PROGRESS NOTES
History of Present Illness:  74 year-old male here to establish care.  He had followed with Dr. Awan for a number of years.  He was recently hospitalized the middle of July with left sided weakness and concern for TIA versus stroke.  Over the past two to three months, he has also noticed general increase in some fatigue and dyspnea, but no chest pain.  He had a stress test scheduled for the end of September, but this was deferred, now cancelled.  He was taking Eliquis and aspirin and was seen by neurology.     Impression:   1. Recent stroke with MRI 07/2025 showing acute lacunar infarct on the right lentiform nucleus, seen by neurology and plan was to continue Eliquis and aspirin.  No hemorrhage.  Remote TIA as well.   2. Paroxysmal atrial fibrillation in the past, on Eliquis and aspirin, no missed doses.    3. CAD with previous LAD stents in 2016.  Last stress test a couple of years ago.    4. Echocardiogram 07/22/2025 with mild to moderate aortic insufficiency and mild mitral stenosis with mean gradient of 5 mmHg.    5. Hypertension.    6. Dyslipidemia.    7. History of UTI.   8. Subcutaneous loop recorder in place, but battery is at end of service.       Plan:  Given exertional dyspnea and CAD, I am going to reschedule his pharmacologic nuclear.  He does have hip pain and had surgery a couple of years ago and is trying to get into [facility] for another opinion.  I am going to schedule him for a CHIVO to evaluate his mitral valve, as well as aortic valve and left atrial appendage to evaluate for any thrombus and see if he might be a candidate for WATCHMAN, which they are asking about.  I am also going to obtain the event monitor and I will see him back after testing with final recommendations.  All questions were answered.         Wt Readings from Last 3 Encounters:   07/31/25 94.8 kg (209 lb)   07/08/25 94.8 kg (209 lb)   01/08/25 97.5 kg (215 lb)     Past Medical History:   Diagnosis Date    Chest pain     Coronary

## 2025-07-31 NOTE — PATIENT INSTRUCTIONS
[facility]          Patient  EP Instructions    Patient’s Name:  Ant Whyte    You are scheduled to have a CHIVO  on  - , at -.    Please check in at -.    Please go to [facility] and park in the outpatient parking lot that is located around to the back of the hospital and enter through the [facility].   Once you enter through the [facility] check in with the  there.  The  will either give you directions or assist you in getting to the cath holding area.          3.  You are not to eat or drink anything after midnight the night before your procedure.     Please continue to take your medications with a small sip of water on the morning of the procedure with the following exceptions:  none.      If you are diabetic, do not take your insulin/sugar pill the morning of the procedure.    We encourage families to wait in the waiting room on the first floor while the procedure is being done.  The Doctor will come out and talk with you as soon as the procedure is over.    There is the possibility that you may spend the night in the hospital, depending on the results of the procedure.  This will be determined after the procedure is done.     8.   If you or your family have any questions, please call our office Monday-Friday 9:00am         -4:30 pm , at [phone], and ask to speak to one of the nurses.        OBTAIN LABWORK ORDERED SEVEN (7)  DAYS PRIOR TO PROCEDURE

## 2025-08-18 ENCOUNTER — RESULTS FOLLOW-UP (OUTPATIENT)
Age: 75
End: 2025-08-18

## 2025-08-25 ENCOUNTER — HOSPITAL ENCOUNTER (OUTPATIENT)
Facility: HOSPITAL | Age: 75
Discharge: HOME | End: 2025-08-25
Attending: INTERNAL MEDICINE | Admitting: INTERNAL MEDICINE
Payer: MEDICARE

## 2025-08-25 ENCOUNTER — ANESTHESIA EVENT (OUTPATIENT)
Facility: HOSPITAL | Age: 75
End: 2025-08-25
Payer: MEDICARE

## 2025-08-25 ENCOUNTER — ANESTHESIA (OUTPATIENT)
Facility: HOSPITAL | Age: 75
End: 2025-08-25
Payer: MEDICARE

## 2025-08-25 VITALS
HEART RATE: 66 BPM | DIASTOLIC BLOOD PRESSURE: 75 MMHG | BODY MASS INDEX: 32.8 KG/M2 | WEIGHT: 209 LBS | OXYGEN SATURATION: 94 % | TEMPERATURE: 96.8 F | RESPIRATION RATE: 19 BRPM | HEIGHT: 67 IN | SYSTOLIC BLOOD PRESSURE: 136 MMHG

## 2025-08-25 DIAGNOSIS — I48.91 ATRIAL FIBRILLATION, UNSPECIFIED TYPE (HCC): ICD-10-CM

## 2025-08-25 DIAGNOSIS — I25.10 ATHEROSCLEROSIS OF NATIVE CORONARY ARTERY, UNSPECIFIED WHETHER ANGINA PRESENT, UNSPECIFIED WHETHER NATIVE OR TRANSPLANTED HEART: ICD-10-CM

## 2025-08-25 LAB
ANION GAP BLD CALC-SCNC: 10.4 MMOL/L (ref 10–20)
CA-I BLD-MCNC: 1.21 MMOL/L (ref 1.15–1.33)
CHLORIDE BLD-SCNC: 103 MMOL/L (ref 98–107)
CO2 BLD-SCNC: 29.6 MMOL/L (ref 21–32)
CREAT BLD-MCNC: 0.92 MG/DL (ref 0.6–1.3)
ECHO AO ASC DIAM: 3.9 CM
ECHO AO ASCENDING AORTA INDEX: 1.89 CM/M2
ECHO BSA: 2.12 M2
GLUCOSE BLD-MCNC: 97 MG/DL (ref 74–99)
LACTATE BLD-SCNC: 1.47 MMOL/L (ref 0.4–2)
POTASSIUM BLD-SCNC: 4.3 MMOL/L (ref 3.5–5.1)
SERVICE CMNT-IMP: NORMAL
SODIUM BLD-SCNC: 143 MMOL/L (ref 136–145)
SPECIMEN SITE: NORMAL

## 2025-08-25 PROCEDURE — 80047 BASIC METABLC PNL IONIZED CA: CPT

## 2025-08-25 PROCEDURE — 2580000003 HC RX 258: Performed by: INTERNAL MEDICINE

## 2025-08-25 PROCEDURE — 93320 DOPPLER ECHO COMPLETE: CPT | Performed by: INTERNAL MEDICINE

## 2025-08-25 PROCEDURE — 93312 ECHO TRANSESOPHAGEAL: CPT

## 2025-08-25 PROCEDURE — 6370000000 HC RX 637 (ALT 250 FOR IP): Performed by: NURSE ANESTHETIST, CERTIFIED REGISTERED

## 2025-08-25 PROCEDURE — 93325 DOPPLER ECHO COLOR FLOW MAPG: CPT | Performed by: INTERNAL MEDICINE

## 2025-08-25 PROCEDURE — 7100000010 HC PHASE II RECOVERY - FIRST 15 MIN

## 2025-08-25 PROCEDURE — 93312 ECHO TRANSESOPHAGEAL: CPT | Performed by: INTERNAL MEDICINE

## 2025-08-25 PROCEDURE — 7100000000 HC PACU RECOVERY - FIRST 15 MIN

## 2025-08-25 PROCEDURE — 6360000002 HC RX W HCPCS: Performed by: NURSE ANESTHETIST, CERTIFIED REGISTERED

## 2025-08-25 PROCEDURE — 83605 ASSAY OF LACTIC ACID: CPT

## 2025-08-25 PROCEDURE — 3700000000 HC ANESTHESIA ATTENDED CARE

## 2025-08-25 RX ORDER — PROPOFOL 10 MG/ML
INJECTION, EMULSION INTRAVENOUS
Status: DISCONTINUED | OUTPATIENT
Start: 2025-08-25 | End: 2025-08-25 | Stop reason: SDUPTHER

## 2025-08-25 RX ORDER — LIDOCAINE HYDROCHLORIDE 20 MG/ML
INJECTION, SOLUTION EPIDURAL; INFILTRATION; INTRACAUDAL; PERINEURAL
Status: DISCONTINUED | OUTPATIENT
Start: 2025-08-25 | End: 2025-08-25 | Stop reason: SDUPTHER

## 2025-08-25 RX ORDER — SODIUM CHLORIDE 9 MG/ML
INJECTION, SOLUTION INTRAVENOUS PRN
Status: DISCONTINUED | OUTPATIENT
Start: 2025-08-25 | End: 2025-08-25 | Stop reason: HOSPADM

## 2025-08-25 RX ORDER — ACYCLOVIR 200 MG/1
10 CAPSULE ORAL ONCE
Status: DISCONTINUED | OUTPATIENT
Start: 2025-08-25 | End: 2025-08-25 | Stop reason: HOSPADM

## 2025-08-25 RX ADMIN — PROPOFOL 50 MG: 10 INJECTION, EMULSION INTRAVENOUS at 07:48

## 2025-08-25 RX ADMIN — PROPOFOL 60 MG: 10 INJECTION, EMULSION INTRAVENOUS at 07:46

## 2025-08-25 RX ADMIN — BENZOCAINE 1 SPRAY: 200 SPRAY DENTAL; ORAL; PERIODONTAL at 07:45

## 2025-08-25 RX ADMIN — LIDOCAINE HYDROCHLORIDE 50 MG: 20 INJECTION, SOLUTION EPIDURAL; INFILTRATION; INTRACAUDAL; PERINEURAL at 07:45

## 2025-08-25 RX ADMIN — PROPOFOL 20 MG: 10 INJECTION, EMULSION INTRAVENOUS at 07:50

## 2025-08-25 RX ADMIN — SODIUM CHLORIDE: 9 INJECTION, SOLUTION INTRAVENOUS at 07:44

## 2025-08-25 ASSESSMENT — ENCOUNTER SYMPTOMS: SHORTNESS OF BREATH: 1

## (undated) DEVICE — SET FLD ADMIN 3 W STPCOCK FIX FEM L BOR 1IN

## (undated) DEVICE — GLIDESHEATH SLENDER STAINLESS STEEL KIT: Brand: GLIDESHEATH SLENDER

## (undated) DEVICE — RADIFOCUS OPTITORQUE ANGIOGRAPHIC CATHETER: Brand: OPTITORQUE

## (undated) DEVICE — PROCEDURE KIT FLUID MGMT 10 FR CUST MAINFOLD

## (undated) DEVICE — PRESSURE MONITORING SET: Brand: TRUWAVE

## (undated) DEVICE — PACK PROCEDURE SURG VASC CATH 161 MMC LF